# Patient Record
Sex: MALE | Race: WHITE | NOT HISPANIC OR LATINO | Employment: FULL TIME | URBAN - METROPOLITAN AREA
[De-identification: names, ages, dates, MRNs, and addresses within clinical notes are randomized per-mention and may not be internally consistent; named-entity substitution may affect disease eponyms.]

---

## 2021-09-23 ENCOUNTER — OFFICE VISIT (OUTPATIENT)
Dept: FAMILY MEDICINE CLINIC | Facility: CLINIC | Age: 41
End: 2021-09-23

## 2021-09-23 VITALS
SYSTOLIC BLOOD PRESSURE: 141 MMHG | TEMPERATURE: 98 F | HEIGHT: 65 IN | DIASTOLIC BLOOD PRESSURE: 93 MMHG | RESPIRATION RATE: 16 BRPM | BODY MASS INDEX: 45.82 KG/M2 | WEIGHT: 275 LBS | OXYGEN SATURATION: 98 % | HEART RATE: 103 BPM

## 2021-09-23 DIAGNOSIS — F32.A ANXIETY AND DEPRESSION: Primary | ICD-10-CM

## 2021-09-23 DIAGNOSIS — Z13.1 SCREENING FOR DIABETES MELLITUS: ICD-10-CM

## 2021-09-23 DIAGNOSIS — R53.83 FATIGUE, UNSPECIFIED TYPE: ICD-10-CM

## 2021-09-23 DIAGNOSIS — Z11.59 NEED FOR HEPATITIS C SCREENING TEST: ICD-10-CM

## 2021-09-23 DIAGNOSIS — F41.9 ANXIETY AND DEPRESSION: Primary | ICD-10-CM

## 2021-09-23 DIAGNOSIS — Z13.220 SCREENING CHOLESTEROL LEVEL: ICD-10-CM

## 2021-09-23 PROCEDURE — 99214 OFFICE O/P EST MOD 30 MIN: CPT | Performed by: PHYSICIAN ASSISTANT

## 2021-09-23 RX ORDER — OMEPRAZOLE 40 MG/1
40 CAPSULE, DELAYED RELEASE ORAL DAILY
Status: ON HOLD | COMMUNITY
End: 2021-11-02

## 2021-09-23 RX ORDER — FLUOXETINE HYDROCHLORIDE 20 MG/1
20 CAPSULE ORAL DAILY
Qty: 30 CAPSULE | Refills: 2 | Status: SHIPPED | OUTPATIENT
Start: 2021-09-23 | End: 2021-10-18

## 2021-09-23 RX ORDER — ALPRAZOLAM 0.5 MG/1
0.5 TABLET ORAL 2 TIMES DAILY PRN
Qty: 30 TABLET | Refills: 0 | Status: SHIPPED | OUTPATIENT
Start: 2021-09-23 | End: 2021-10-18

## 2021-09-23 NOTE — PROGRESS NOTES
Subjective   Yoan Salas is a 41 y.o. male.     Pt presents as a new patient to establish care.  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  He has had hx of depression but has been having more problems with anxiety since May.  He has been having panic attacks a few times. He has been seeing a therapist for the past year and she recommended that he comes to possibly get medication.  He has been having more problems since COVID19 started coming getting worse again.  He is vaccinated.  He was working from home due to COVID19 last year and then went back to the office but thinks that is affecting his anxiety due to fears about COVID19.  His wife thinks since he had cancer when he was younger, the thought of COVID19 brings back those memories and fears.         The following portions of the patient's history were reviewed and updated as appropriate: allergies, current medications, past family history, past medical history, past social history, past surgical history and problem list.  Past Medical History:   Diagnosis Date   • Allergic    • Anxiety    • Asthma    • Cancer (CMS/HCC)     ALL   • Depression    • GERD (gastroesophageal reflux disease)      History reviewed. No pertinent surgical history.  Family History   Problem Relation Age of Onset   • Cancer Maternal Grandfather      Social History     Socioeconomic History   • Marital status:      Spouse name: Not on file   • Number of children: Not on file   • Years of education: Not on file   • Highest education level: Not on file   Tobacco Use   • Smoking status: Never Smoker   • Smokeless tobacco: Never Used   Substance and Sexual Activity   • Alcohol use: Yes     Comment: Weekends   • Sexual activity: Defer         Current Outpatient Medications:   •  omeprazole (priLOSEC) 40 MG capsule, Take 40 mg by mouth Daily., Disp: , Rfl:   •  ALPRAZolam (Xanax) 0.5 MG tablet, Take 1 tablet by mouth 2 (Two) Times a Day As Needed for Anxiety., Disp: 30 tablet, Rfl: 0  •  FLUoxetine  "(PROzac) 20 MG capsule, Take 1 capsule by mouth Daily., Disp: 30 capsule, Rfl: 2    Review of Systems   Constitutional: Negative for activity change, appetite change, chills, diaphoresis, fatigue, fever, unexpected weight gain and unexpected weight loss.   Respiratory: Negative for chest tightness and shortness of breath.    Cardiovascular: Negative for chest pain, palpitations and leg swelling.   Gastrointestinal: Negative for abdominal pain, constipation, diarrhea, nausea, vomiting and GERD.   Neurological: Negative for dizziness, weakness, light-headedness and confusion.   Psychiatric/Behavioral: Positive for depressed mood and stress. Negative for self-injury, sleep disturbance and suicidal ideas. The patient is nervous/anxious.      /93 (BP Location: Right arm, Patient Position: Sitting, Cuff Size: Large Adult)   Pulse 103   Temp 98 °F (36.7 °C) (Temporal)   Resp 16   Ht 165.5 cm (65.16\")   Wt 125 kg (275 lb)   SpO2 98%   BMI 45.54 kg/m²       Objective   Physical Exam  Vitals and nursing note reviewed.   Constitutional:       Appearance: Normal appearance.   Eyes:      Pupils: Pupils are equal, round, and reactive to light.   Neck:      Thyroid: No thyroid mass, thyromegaly or thyroid tenderness.      Vascular: No carotid bruit.   Cardiovascular:      Rate and Rhythm: Normal rate and regular rhythm.      Heart sounds: Normal heart sounds.   Pulmonary:      Effort: Pulmonary effort is normal.      Breath sounds: Normal breath sounds.   Abdominal:      General: Abdomen is flat. Bowel sounds are normal.      Palpations: Abdomen is soft.   Musculoskeletal:      Cervical back: Normal range of motion and neck supple.   Skin:     General: Skin is warm and dry.   Neurological:      Mental Status: He is alert and oriented to person, place, and time.   Psychiatric:         Mood and Affect: Mood is anxious.         Behavior: Behavior normal.         Thought Content: Thought content normal.         Procedures "     Assessment/Plan   Diagnoses and all orders for this visit:    1. Anxiety and depression (Primary)  -     FLUoxetine (PROzac) 20 MG capsule; Take 1 capsule by mouth Daily.  Dispense: 30 capsule; Refill: 2  -     ALPRAZolam (Xanax) 0.5 MG tablet; Take 1 tablet by mouth 2 (Two) Times a Day As Needed for Anxiety.  Dispense: 30 tablet; Refill: 0    2. Need for hepatitis C screening test  -     Hepatitis C Antibody; Future    3. Screening cholesterol level  -     Lipid Panel; Future    4. Screening for diabetes mellitus  -     Comprehensive Metabolic Panel; Future    5. Fatigue, unspecified type  -     CBC & Differential; Future  -     TSH; Future      Pt to start on prozac and xanax as needed until the prozac starts working.  Pt to also work on exercise to help with anxiety. Pt to get fasting labs done and will call with results.  He will continue therapy with his therapist.    I spent 30 minutes of patient care including reviewing pt's previous hx, reviewing current symptoms, performing exam, ordering tests, discussing treatment plan and completing my note.

## 2021-09-24 ENCOUNTER — TELEPHONE (OUTPATIENT)
Dept: FAMILY MEDICINE CLINIC | Facility: CLINIC | Age: 41
End: 2021-09-24

## 2021-09-24 NOTE — TELEPHONE ENCOUNTER
Caller: Yoan Salas    Relationship: Self    Best call back number: [phone] (H)    What medications are you currently taking:   Current Outpatient Medications on File Prior to Visit   Medication Sig Dispense Refill   • ALPRAZolam (Xanax) 0.5 MG tablet Take 1 tablet by mouth 2 (Two) Times a Day As Needed for Anxiety. 30 tablet 0   • FLUoxetine (PROzac) 20 MG capsule Take 1 capsule by mouth Daily. 30 capsule 2   • omeprazole (priLOSEC) 40 MG capsule Take 40 mg by mouth Daily.       No current facility-administered medications on file prior to visit.          When did you start taking these medications: 09/23/21    Which medication are you concerned about: FLUoxetine (PROzac) 20 MG capsule AND ALPRAZolam (Xanax) 0.5 MG tablet    Who prescribed you this medication: EMMANUEL LIN    What are your concerns:SIDNEY SANTILLAN HAS SOME QUESTIONS REGARDING MEDICATIONS    How long have you had these concerns:

## 2021-09-24 NOTE — TELEPHONE ENCOUNTER
He takes the prozac every morning.  The xanax he can take as needed if he is feeling anxious up to 2 times daily.

## 2021-09-24 NOTE — TELEPHONE ENCOUNTER
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   He was jittery this morning and was going to take it before heading into work and now he is concerned.   Is there something else he can take ?   He has only taken the Prozac last night and he had planned on taking at dinner. So can he change to taking in the morning. With this medicine is he able to have a drink here or there with his wife? Like 1 or 2 on the weekends not every day or weekly.

## 2021-09-24 NOTE — TELEPHONE ENCOUNTER
Having a drink once in a while is fine with the medication.  He can switch the prozac to the morning without any issues.  Instead of taking it tonight, he would wait and take it tomorrow morning.  If he doesn't work on the weekends, see if he can try the xanax this weekend when he isn't going to have to drive and see how he feels.  If it makes him feel drowsy, I can send something else in but that can also cause drowsiness.  He can also start with taking a 1/2 tab of the xanax and see how he feels.

## 2021-09-28 ENCOUNTER — LAB (OUTPATIENT)
Dept: FAMILY MEDICINE CLINIC | Facility: CLINIC | Age: 41
End: 2021-09-28

## 2021-09-28 DIAGNOSIS — Z11.59 NEED FOR HEPATITIS C SCREENING TEST: ICD-10-CM

## 2021-09-28 DIAGNOSIS — D64.9 ANEMIA, UNSPECIFIED TYPE: ICD-10-CM

## 2021-09-28 DIAGNOSIS — R53.83 FATIGUE, UNSPECIFIED TYPE: ICD-10-CM

## 2021-09-28 DIAGNOSIS — Z13.220 SCREENING CHOLESTEROL LEVEL: ICD-10-CM

## 2021-09-28 DIAGNOSIS — Z13.1 SCREENING FOR DIABETES MELLITUS: ICD-10-CM

## 2021-09-28 DIAGNOSIS — D64.9 ANEMIA, UNSPECIFIED TYPE: Primary | ICD-10-CM

## 2021-09-28 LAB
ALBUMIN SERPL-MCNC: 4.3 G/DL (ref 3.5–5.2)
ALBUMIN/GLOB SERPL: 1.4 G/DL
ALP SERPL-CCNC: 75 U/L (ref 39–117)
ALT SERPL W P-5'-P-CCNC: 27 U/L (ref 1–41)
ANION GAP SERPL CALCULATED.3IONS-SCNC: 10.4 MMOL/L (ref 5–15)
AST SERPL-CCNC: 14 U/L (ref 1–40)
BASOPHILS # BLD AUTO: 0.04 10*3/MM3 (ref 0–0.2)
BASOPHILS NFR BLD AUTO: 0.6 % (ref 0–1.5)
BILIRUB SERPL-MCNC: 0.3 MG/DL (ref 0–1.2)
BUN SERPL-MCNC: 14 MG/DL (ref 6–20)
BUN/CREAT SERPL: 14.9 (ref 7–25)
CALCIUM SPEC-SCNC: 9.1 MG/DL (ref 8.6–10.5)
CHLORIDE SERPL-SCNC: 102 MMOL/L (ref 98–107)
CHOLEST SERPL-MCNC: 189 MG/DL (ref 0–200)
CO2 SERPL-SCNC: 23.6 MMOL/L (ref 22–29)
CREAT SERPL-MCNC: 0.94 MG/DL (ref 0.76–1.27)
DEPRECATED RDW RBC AUTO: 46.8 FL (ref 37–54)
EOSINOPHIL # BLD AUTO: 0.11 10*3/MM3 (ref 0–0.4)
EOSINOPHIL NFR BLD AUTO: 1.7 % (ref 0.3–6.2)
ERYTHROCYTE [DISTWIDTH] IN BLOOD BY AUTOMATED COUNT: 14.8 % (ref 12.3–15.4)
FERRITIN SERPL-MCNC: 73.6 NG/ML (ref 30–400)
GFR SERPL CREATININE-BSD FRML MDRD: 89 ML/MIN/1.73
GLOBULIN UR ELPH-MCNC: 3.1 GM/DL
GLUCOSE SERPL-MCNC: 94 MG/DL (ref 65–99)
HCT VFR BLD AUTO: 37.6 % (ref 37.5–51)
HCV AB SER DONR QL: NORMAL
HDLC SERPL-MCNC: 31 MG/DL (ref 40–60)
HGB BLD-MCNC: 12.2 G/DL (ref 13–17.7)
IMM GRANULOCYTES # BLD AUTO: 0.01 10*3/MM3 (ref 0–0.05)
IMM GRANULOCYTES NFR BLD AUTO: 0.2 % (ref 0–0.5)
IRON 24H UR-MRATE: 46 MCG/DL (ref 59–158)
LDLC SERPL CALC-MCNC: 133 MG/DL (ref 0–100)
LDLC/HDLC SERPL: 4.21 {RATIO}
LYMPHOCYTES # BLD AUTO: 2.33 10*3/MM3 (ref 0.7–3.1)
LYMPHOCYTES NFR BLD AUTO: 35.1 % (ref 19.6–45.3)
MCH RBC QN AUTO: 27.7 PG (ref 26.6–33)
MCHC RBC AUTO-ENTMCNC: 32.4 G/DL (ref 31.5–35.7)
MCV RBC AUTO: 85.5 FL (ref 79–97)
MONOCYTES # BLD AUTO: 0.55 10*3/MM3 (ref 0.1–0.9)
MONOCYTES NFR BLD AUTO: 8.3 % (ref 5–12)
NEUTROPHILS NFR BLD AUTO: 3.6 10*3/MM3 (ref 1.7–7)
NEUTROPHILS NFR BLD AUTO: 54.1 % (ref 42.7–76)
NRBC BLD AUTO-RTO: 0 /100 WBC (ref 0–0.2)
PLATELET # BLD AUTO: 361 10*3/MM3 (ref 140–450)
PMV BLD AUTO: 10.4 FL (ref 6–12)
POTASSIUM SERPL-SCNC: 3.9 MMOL/L (ref 3.5–5.2)
PROT SERPL-MCNC: 7.4 G/DL (ref 6–8.5)
RBC # BLD AUTO: 4.4 10*6/MM3 (ref 4.14–5.8)
SODIUM SERPL-SCNC: 136 MMOL/L (ref 136–145)
TRIGL SERPL-MCNC: 137 MG/DL (ref 0–150)
TSH SERPL DL<=0.05 MIU/L-ACNC: 2.1 UIU/ML (ref 0.27–4.2)
VLDLC SERPL-MCNC: 25 MG/DL (ref 5–40)
WBC # BLD AUTO: 6.64 10*3/MM3 (ref 3.4–10.8)

## 2021-09-28 PROCEDURE — 82746 ASSAY OF FOLIC ACID SERUM: CPT | Performed by: PHYSICIAN ASSISTANT

## 2021-09-28 PROCEDURE — 82607 VITAMIN B-12: CPT | Performed by: PHYSICIAN ASSISTANT

## 2021-09-28 PROCEDURE — 86803 HEPATITIS C AB TEST: CPT | Performed by: PHYSICIAN ASSISTANT

## 2021-09-28 PROCEDURE — 84443 ASSAY THYROID STIM HORMONE: CPT | Performed by: PHYSICIAN ASSISTANT

## 2021-09-28 PROCEDURE — 83540 ASSAY OF IRON: CPT | Performed by: PHYSICIAN ASSISTANT

## 2021-09-28 PROCEDURE — 80053 COMPREHEN METABOLIC PANEL: CPT | Performed by: PHYSICIAN ASSISTANT

## 2021-09-28 PROCEDURE — 80061 LIPID PANEL: CPT | Performed by: PHYSICIAN ASSISTANT

## 2021-09-28 PROCEDURE — 36415 COLL VENOUS BLD VENIPUNCTURE: CPT

## 2021-09-28 PROCEDURE — 82728 ASSAY OF FERRITIN: CPT | Performed by: PHYSICIAN ASSISTANT

## 2021-09-28 PROCEDURE — 85025 COMPLETE CBC W/AUTO DIFF WBC: CPT | Performed by: PHYSICIAN ASSISTANT

## 2021-09-29 DIAGNOSIS — D50.9 IRON DEFICIENCY ANEMIA, UNSPECIFIED IRON DEFICIENCY ANEMIA TYPE: Primary | ICD-10-CM

## 2021-09-29 LAB
FOLATE SERPL-MCNC: 7.02 NG/ML (ref 4.78–24.2)
VIT B12 BLD-MCNC: 476 PG/ML (ref 211–946)

## 2021-09-30 ENCOUNTER — TELEPHONE (OUTPATIENT)
Dept: FAMILY MEDICINE CLINIC | Facility: CLINIC | Age: 41
End: 2021-09-30

## 2021-09-30 NOTE — TELEPHONE ENCOUNTER
Hub staff attempted to follow warm transfer process and was unsuccessful     Caller: Yoan Salas    Relationship: Self    Best call back number:[phone]  Who are you requesting to speak with (clinical staff, provider,  specific staff member): CLINICAL STAFF     What was the call regarding: RETURNING PHONE CALL FOR LAB RESULTS     Do you require a callback: YES

## 2021-10-01 ENCOUNTER — OFFICE (OUTPATIENT)
Dept: URBAN - METROPOLITAN AREA CLINIC 64 | Facility: CLINIC | Age: 41
End: 2021-10-01

## 2021-10-01 VITALS
WEIGHT: 272 LBS | HEART RATE: 96 BPM | SYSTOLIC BLOOD PRESSURE: 161 MMHG | HEIGHT: 69 IN | DIASTOLIC BLOOD PRESSURE: 108 MMHG

## 2021-10-01 DIAGNOSIS — D50.0 IRON DEFICIENCY ANEMIA SECONDARY TO BLOOD LOSS (CHRONIC): ICD-10-CM

## 2021-10-01 DIAGNOSIS — K21.9 GASTRO-ESOPHAGEAL REFLUX DISEASE WITHOUT ESOPHAGITIS: ICD-10-CM

## 2021-10-01 PROCEDURE — 99202 OFFICE O/P NEW SF 15 MIN: CPT | Performed by: INTERNAL MEDICINE

## 2021-10-01 RX ORDER — OMEPRAZOLE 40 MG/1
CAPSULE, DELAYED RELEASE ORAL
Qty: 90 | Refills: 5 | Status: ACTIVE

## 2021-10-01 RX ORDER — FLUOXETINE HYDROCHLORIDE 40 MG/1
CAPSULE ORAL
Qty: 90 | Refills: 5 | Status: ACTIVE

## 2021-10-04 ENCOUNTER — TELEPHONE (OUTPATIENT)
Dept: FAMILY MEDICINE CLINIC | Facility: CLINIC | Age: 41
End: 2021-10-04

## 2021-10-04 NOTE — TELEPHONE ENCOUNTER
Caller: Yoan Salas    Relationship: Self    Best call back number: [phone]    Who are you requesting to speak with (clinical staff, provider,  specific staff member): EMMANUEL LIN     What was the call regarding: PATIENT STATES A TUMOR HAS BEEN FOUND AND THEY ARE DOING TESTING ON IT AND WANTED TO MAKE SURE EMMANUEL HAS THE INFORMATION AND WOULD LIKE TO SPEAK TO HER IF POSSIBLE     Do you require a callback: YES

## 2021-10-05 ENCOUNTER — TELEPHONE (OUTPATIENT)
Dept: FAMILY MEDICINE CLINIC | Facility: CLINIC | Age: 41
End: 2021-10-05

## 2021-10-05 NOTE — TELEPHONE ENCOUNTER
Spoke with pt and he had CT head ordered by GI since he has difficulty finding the right words for what he was trying to say while in his office.  He was told he has a tumor and he is scheduled to have EEG and meet with neurosurgeon.  He will let me know if he needs anything else.

## 2021-10-11 ENCOUNTER — TELEPHONE (OUTPATIENT)
Dept: FAMILY MEDICINE CLINIC | Facility: CLINIC | Age: 41
End: 2021-10-11

## 2021-10-11 NOTE — TELEPHONE ENCOUNTER
He can start by taking the med every other day for a week then every two days for a week then every three days for a week... and so on for a month.

## 2021-10-11 NOTE — TELEPHONE ENCOUNTER
Caller: Yoan Salas    Relationship: Self    Best call back number:  [phone]     What is the best time to reach you: ANY     Who are you requesting to speak with (clinical staff, provider,  specific staff member): EMMANUEL LIN     Do you know the name of the person who called: N/A     What was the call regarding: PATIENT WOULD LIKE DIRECTIONS ON HOW TO BEST WEEN OFF FLUoxetine (PROzac) 20 MG capsule . PATIENT STATED HE DOESN'T THINK HE NEEDS IT .     Do you require a callback: YES

## 2021-10-15 ENCOUNTER — TRANSCRIBE ORDERS (OUTPATIENT)
Dept: ADMINISTRATIVE | Facility: HOSPITAL | Age: 41
End: 2021-10-15

## 2021-10-15 DIAGNOSIS — D49.6 BRAIN TUMOR (HCC): Primary | ICD-10-CM

## 2021-10-17 RX ORDER — FLUOXETINE HYDROCHLORIDE 40 MG/1
CAPSULE ORAL
COMMUNITY
Start: 2021-10-01 | End: 2021-10-18

## 2021-10-18 ENCOUNTER — OFFICE VISIT (OUTPATIENT)
Dept: FAMILY MEDICINE CLINIC | Facility: CLINIC | Age: 41
End: 2021-10-18

## 2021-10-18 ENCOUNTER — TELEPHONE (OUTPATIENT)
Dept: FAMILY MEDICINE CLINIC | Facility: CLINIC | Age: 41
End: 2021-10-18

## 2021-10-18 VITALS
DIASTOLIC BLOOD PRESSURE: 112 MMHG | OXYGEN SATURATION: 97 % | WEIGHT: 272 LBS | BODY MASS INDEX: 45.32 KG/M2 | RESPIRATION RATE: 16 BRPM | TEMPERATURE: 98.6 F | HEART RATE: 97 BPM | SYSTOLIC BLOOD PRESSURE: 170 MMHG | HEIGHT: 65 IN

## 2021-10-18 DIAGNOSIS — R03.0 BLOOD PRESSURE ELEVATED WITHOUT HISTORY OF HTN: ICD-10-CM

## 2021-10-18 DIAGNOSIS — F32.A ANXIETY AND DEPRESSION: Primary | ICD-10-CM

## 2021-10-18 DIAGNOSIS — F41.9 ANXIETY AND DEPRESSION: Primary | ICD-10-CM

## 2021-10-18 DIAGNOSIS — R12 HEARTBURN: ICD-10-CM

## 2021-10-18 DIAGNOSIS — G93.89 LEFT PARIETAL MASS: ICD-10-CM

## 2021-10-18 PROCEDURE — 99213 OFFICE O/P EST LOW 20 MIN: CPT | Performed by: PHYSICIAN ASSISTANT

## 2021-10-18 RX ORDER — LEVETIRACETAM 500 MG/1
500 TABLET ORAL 2 TIMES DAILY
Status: ON HOLD | COMMUNITY
Start: 2021-10-14 | End: 2021-11-05 | Stop reason: SDUPTHER

## 2021-10-18 RX ORDER — DEXAMETHASONE 4 MG/1
4 TABLET ORAL 4 TIMES DAILY
COMMUNITY
Start: 2021-10-14 | End: 2021-11-05 | Stop reason: HOSPADM

## 2021-10-18 RX ORDER — FAMOTIDINE 20 MG/1
20 TABLET, FILM COATED ORAL 2 TIMES DAILY
COMMUNITY
End: 2022-11-23

## 2021-10-18 NOTE — PROGRESS NOTES
Subjective   Yoan Salas is a 41 y.o. male.     Pt is here to follow up on his anxiety and discuss a brain tumor that was recently diagnosed after a CT scan.  Pt was recently diagnosed with a brain tumor found on a CT scan head ordered by Dr. Thompson at his visit with him when he was having confusion and problems with speech.  He is seeing neurosurgeon and has surgery is set for November 2nd.    His blood pressure is elevated today.  He is not sure if it is due to feeling nervous.  He is also on steroids currently to try to decrease the inflammation of his brain tumor.  He has tapered off his prozac since he didn't think he needed it.  He has been overall not feeling as anxious and feels like the prozac made him feel a little worse.  He denies any current headache or chest pain.  His speech is normal today in office.       The following portions of the patient's history were reviewed and updated as appropriate: allergies, current medications, past family history, past medical history, past social history, past surgical history and problem list.  Past Medical History:   Diagnosis Date   • Allergic    • Anxiety    • Asthma    • Cancer (HCC)     ALL   • Depression    • GERD (gastroesophageal reflux disease)      No past surgical history on file.  Family History   Problem Relation Age of Onset   • Cancer Maternal Grandfather      Social History     Socioeconomic History   • Marital status:    Tobacco Use   • Smoking status: Never Smoker   • Smokeless tobacco: Never Used   Substance and Sexual Activity   • Alcohol use: Yes     Comment: Weekends   • Sexual activity: Defer         Current Outpatient Medications:   •  famotidine (PEPCID) 20 MG tablet, Take 20 mg by mouth 2 (Two) Times a Day., Disp: , Rfl:   •  dexamethasone (DECADRON) 4 MG tablet, Take 4 mg by mouth 4 (Four) Times a Day., Disp: , Rfl:   •  levETIRAcetam (KEPPRA) 500 MG tablet, Take 500 mg by mouth 2 (two) times a day., Disp: , Rfl:   •  omeprazole  "(priLOSEC) 40 MG capsule, Take 40 mg by mouth Daily., Disp: , Rfl:   •  sucralfate (Carafate) 1 g tablet, Take 1 tablet by mouth 4 (Four) Times a Day., Disp: 28 tablet, Rfl: 0    Review of Systems   Constitutional: Negative for activity change, appetite change, chills, diaphoresis, fatigue, fever, unexpected weight gain and unexpected weight loss.   Eyes: Negative for blurred vision, double vision and visual disturbance.   Respiratory: Negative for chest tightness and shortness of breath.    Cardiovascular: Negative for chest pain, palpitations and leg swelling.   Gastrointestinal: Negative for abdominal pain, nausea and vomiting.   Neurological: Positive for speech difficulty and memory problem. Negative for dizziness, seizures, weakness, light-headedness, headache and confusion.   Psychiatric/Behavioral: Positive for stress. Negative for sleep disturbance and depressed mood. The patient is nervous/anxious.      BP (!) 170/112 (BP Location: Left arm, Patient Position: Sitting, Cuff Size: Large Adult)   Pulse 97   Temp 98.6 °F (37 °C) (Temporal)   Resp 16   Ht 165.5 cm (65.16\")   Wt 123 kg (272 lb)   SpO2 97%   BMI 45.05 kg/m²       Objective   Physical Exam  Vitals and nursing note reviewed.   Constitutional:       Appearance: Normal appearance.   HENT:      Head: Normocephalic and atraumatic.   Eyes:      Pupils: Pupils are equal, round, and reactive to light.   Cardiovascular:      Rate and Rhythm: Normal rate and regular rhythm.      Heart sounds: Normal heart sounds.   Pulmonary:      Effort: Pulmonary effort is normal.      Breath sounds: Normal breath sounds.   Abdominal:      General: Abdomen is flat. Bowel sounds are normal.      Palpations: Abdomen is soft.   Skin:     General: Skin is warm and dry.   Neurological:      Mental Status: He is alert and oriented to person, place, and time. Mental status is at baseline.   Psychiatric:         Attention and Perception: Attention and perception normal.    "      Mood and Affect: Mood is anxious.         Speech: Speech normal.         Behavior: Behavior normal.         Thought Content: Thought content normal.         Cognition and Memory: Cognition normal.         Judgment: Judgment normal.         Procedures     Assessment/Plan   Diagnoses and all orders for this visit:    1. Anxiety and depression (Primary)  Pt to feels like he doesn't need the prozac presently since he feels like he felt a little worse when taking it.  He does still appear somewhat anxious in office today but would prefer to avoid medications at this time.  2. Left parietal mass  Pt to continue follow up with neurosurgery and has surgery scheduled for November 2nd as of now.  3. Blood pressure elevated without history of HTN  Blood pressure likely elevated secondary to some residual anxiety/stress and the decadron he is currently taking for his brain tumor.  Pt to monitor at home and let me know how his levels are doing.  Also work on low sodium diet.  4. Heartburn R12  Likely secondary to steroid use.  Pt to continue his current meds and try adding tums or mylanta as needed.  Let me know if not improving.  Make sure he is also taking his steroids with food on his stomach.  Pt to follow up with neurosurgeon as scheduled.  His blood pressure is extremely elevated today but he denies any current symptoms of headache or vision problem.  He has stopped taking his prozac since he thinks his mood changes were due to tumor.  He is taking his medications as prescribed by neurosurgeon.  If blood pressure is elevated at home, will need to start bp medication.

## 2021-10-19 NOTE — TELEPHONE ENCOUNTER
Please check with pt to see if he can monitor his blood pressure at home since it was pretty high in office today.  It could be secondary to the oral steroid.  If it is elevated at home as well, may need to start on medication.

## 2021-10-19 NOTE — TELEPHONE ENCOUNTER
Text patient and let him know the range and to keep track for a week and then to call us and let us know what they are and we can go from there

## 2021-10-20 ENCOUNTER — TELEPHONE (OUTPATIENT)
Dept: FAMILY MEDICINE CLINIC | Facility: CLINIC | Age: 41
End: 2021-10-20

## 2021-10-20 DIAGNOSIS — K21.00 GASTROESOPHAGEAL REFLUX DISEASE WITH ESOPHAGITIS WITHOUT HEMORRHAGE: Primary | ICD-10-CM

## 2021-10-20 RX ORDER — SUCRALFATE 1 G/1
1 TABLET ORAL 4 TIMES DAILY
Qty: 28 TABLET | Refills: 0 | Status: SHIPPED | OUTPATIENT
Start: 2021-10-20 | End: 2021-10-29 | Stop reason: SDUPTHER

## 2021-10-20 NOTE — TELEPHONE ENCOUNTER
Please let pt know that I sent a prescription to his pharmacy.  He can still take his regular medications with this.

## 2021-10-20 NOTE — TELEPHONE ENCOUNTER
Caller: Yoan Salas    Relationship: Self    Best call back number: [phone]     What medication are you requesting: ANTIACID    What are your current symptoms: STOMACH IRRITATION IS NOT GETTING BETTER    How long have you been experiencing symptoms: A FEW DAYS       If a prescription is needed, what is your preferred pharmacy and phone number:    [facility] - [place], [place] - [address] [facility] - [phone]  - [phone]   [phone]  \  Additional notes:PATIENT NEEDS TO BE ADVISED. OVER THE COUNTER MEDS HAVE NOT MADE THIS ANY BETTER.

## 2021-10-21 DIAGNOSIS — I15.8 OTHER SECONDARY HYPERTENSION: Primary | ICD-10-CM

## 2021-10-21 RX ORDER — AMLODIPINE BESYLATE 5 MG/1
5 TABLET ORAL DAILY
Qty: 30 TABLET | Refills: 0 | Status: SHIPPED | OUTPATIENT
Start: 2021-10-21 | End: 2021-10-27

## 2021-10-21 NOTE — TELEPHONE ENCOUNTER
Please let pt know that since it is still elevated, I would like him to try amlodipine which is a blood pressure medication to see if we can get is under better control. He can continue to monitor it and let me know how his blood pressure is doing early next week.

## 2021-10-26 ENCOUNTER — APPOINTMENT (OUTPATIENT)
Dept: OTHER | Facility: HOSPITAL | Age: 41
End: 2021-10-26

## 2021-10-26 ENCOUNTER — HOSPITAL ENCOUNTER (OUTPATIENT)
Dept: MRI IMAGING | Facility: HOSPITAL | Age: 41
Discharge: HOME | End: 2021-10-26

## 2021-10-26 ENCOUNTER — LAB (OUTPATIENT)
Dept: LAB | Facility: HOSPITAL | Age: 41
End: 2021-10-26

## 2021-10-26 ENCOUNTER — HOSPITAL ENCOUNTER (OUTPATIENT)
Dept: GENERAL RADIOLOGY | Facility: HOSPITAL | Age: 41
Discharge: HOME | End: 2021-10-26

## 2021-10-26 DIAGNOSIS — Z09 FOLLOW UP: ICD-10-CM

## 2021-10-26 DIAGNOSIS — D49.6 BRAIN TUMOR (HCC): ICD-10-CM

## 2021-10-26 LAB
ANION GAP SERPL CALCULATED.3IONS-SCNC: 13.9 MMOL/L (ref 5–15)
APTT PPP: 21.9 SECONDS (ref 24–31)
BASOPHILS # BLD AUTO: 0.1 10*3/MM3 (ref 0–0.2)
BASOPHILS NFR BLD AUTO: 0.6 % (ref 0–1.5)
BILIRUB UR QL STRIP: NEGATIVE
BUN SERPL-MCNC: 26 MG/DL (ref 6–20)
BUN/CREAT SERPL: 31.7 (ref 7–25)
CALCIUM SPEC-SCNC: 8.9 MG/DL (ref 8.6–10.5)
CHLORIDE SERPL-SCNC: 95 MMOL/L (ref 98–107)
CLARITY UR: CLEAR
CO2 SERPL-SCNC: 21.1 MMOL/L (ref 22–29)
COLOR UR: YELLOW
CREAT BLDA-MCNC: 0.8 MG/DL (ref 0.6–1.3)
CREAT SERPL-MCNC: 0.82 MG/DL (ref 0.76–1.27)
DEPRECATED RDW RBC AUTO: 47.7 FL (ref 37–54)
EOSINOPHIL # BLD AUTO: 0 10*3/MM3 (ref 0–0.4)
EOSINOPHIL NFR BLD AUTO: 0 % (ref 0.3–6.2)
ERYTHROCYTE [DISTWIDTH] IN BLOOD BY AUTOMATED COUNT: 15.7 % (ref 12.3–15.4)
GFR SERPL CREATININE-BSD FRML MDRD: 104 ML/MIN/1.73
GLUCOSE SERPL-MCNC: 158 MG/DL (ref 65–99)
GLUCOSE UR STRIP-MCNC: NEGATIVE MG/DL
HCT VFR BLD AUTO: 43.5 % (ref 37.5–51)
HGB BLD-MCNC: 14.2 G/DL (ref 13–17.7)
HGB UR QL STRIP.AUTO: NEGATIVE
IMM GRANULOCYTES # BLD AUTO: 0.76 10*3/MM3 (ref 0–0.05)
IMM GRANULOCYTES NFR BLD AUTO: 4.8 % (ref 0–0.5)
INR PPP: 0.98 (ref 0.93–1.1)
KETONES UR QL STRIP: NEGATIVE
LEUKOCYTE ESTERASE UR QL STRIP.AUTO: NEGATIVE
LYMPHOCYTES # BLD AUTO: 0.94 10*3/MM3 (ref 0.7–3.1)
LYMPHOCYTES NFR BLD AUTO: 6 % (ref 19.6–45.3)
MCH RBC QN AUTO: 27.3 PG (ref 26.6–33)
MCHC RBC AUTO-ENTMCNC: 32.6 G/DL (ref 31.5–35.7)
MCV RBC AUTO: 83.7 FL (ref 79–97)
MONOCYTES # BLD AUTO: 1.16 10*3/MM3 (ref 0.1–0.9)
MONOCYTES NFR BLD AUTO: 7.4 % (ref 5–12)
MRSA DNA SPEC QL NAA+PROBE: NORMAL
NEUTROPHILS NFR BLD AUTO: 12.82 10*3/MM3 (ref 1.7–7)
NEUTROPHILS NFR BLD AUTO: 81.2 % (ref 42.7–76)
NITRITE UR QL STRIP: NEGATIVE
NRBC BLD AUTO-RTO: 0.1 /100 WBC (ref 0–0.2)
PH UR STRIP.AUTO: 6 [PH] (ref 5–8)
PLATELET # BLD AUTO: 387 10*3/MM3 (ref 140–450)
PMV BLD AUTO: 10.2 FL (ref 6–12)
POTASSIUM SERPL-SCNC: 4.4 MMOL/L (ref 3.5–5.2)
PROT UR QL STRIP: ABNORMAL
PROTHROMBIN TIME: 10.9 SECONDS (ref 9.6–11.7)
RBC # BLD AUTO: 5.2 10*6/MM3 (ref 4.14–5.8)
SODIUM SERPL-SCNC: 130 MMOL/L (ref 136–145)
SP GR UR STRIP: 1.03 (ref 1–1.03)
UROBILINOGEN UR QL STRIP: ABNORMAL
WBC # BLD AUTO: 15.78 10*3/MM3 (ref 3.4–10.8)

## 2021-10-26 PROCEDURE — 70553 MRI BRAIN STEM W/O & W/DYE: CPT

## 2021-10-26 PROCEDURE — 85610 PROTHROMBIN TIME: CPT

## 2021-10-26 PROCEDURE — 71046 X-RAY EXAM CHEST 2 VIEWS: CPT

## 2021-10-26 PROCEDURE — 81003 URINALYSIS AUTO W/O SCOPE: CPT

## 2021-10-26 PROCEDURE — 82565 ASSAY OF CREATININE: CPT

## 2021-10-26 PROCEDURE — 85025 COMPLETE CBC W/AUTO DIFF WBC: CPT

## 2021-10-26 PROCEDURE — 85730 THROMBOPLASTIN TIME PARTIAL: CPT

## 2021-10-26 PROCEDURE — 87641 MR-STAPH DNA AMP PROBE: CPT

## 2021-10-26 PROCEDURE — 25010000002 GADOTERIDOL PER 1 ML: Performed by: NEUROLOGICAL SURGERY

## 2021-10-26 PROCEDURE — A9579 GAD-BASE MR CONTRAST NOS,1ML: HCPCS | Performed by: NEUROLOGICAL SURGERY

## 2021-10-26 PROCEDURE — 80048 BASIC METABOLIC PNL TOTAL CA: CPT

## 2021-10-26 RX ADMIN — GADOTERIDOL 20 ML: 279.3 INJECTION, SOLUTION INTRAVENOUS at 11:36

## 2021-10-27 ENCOUNTER — TELEPHONE (OUTPATIENT)
Dept: FAMILY MEDICINE CLINIC | Facility: CLINIC | Age: 41
End: 2021-10-27

## 2021-10-27 DIAGNOSIS — I15.8 OTHER SECONDARY HYPERTENSION: ICD-10-CM

## 2021-10-27 RX ORDER — AMLODIPINE BESYLATE 10 MG/1
10 TABLET ORAL DAILY
Qty: 30 TABLET | Refills: 0
Start: 2021-10-27 | End: 2021-11-29 | Stop reason: SDUPTHER

## 2021-10-27 NOTE — TELEPHONE ENCOUNTER
PATIENT CALLED TO GIVE THE FOLLOWING BLOOD PRESSURE READINGS:     10/19 - 146/104 PM   10/20 - 173/104 AM  154/112  156/113PM   10/21 - 184/116 AM  170/115   142/107PM   10/22 - 188/124 AM  154/105 /104PM 10/23 - 149/107 AM  139/96 PM  10/24 - 104/102 AM  144/95 3PM 140/101 PM  10/25 - 135/95 AM   10/26 - 146/103 AM  145/110 PM     PLEASE ADVISE     PT CALL BACK   [phone]

## 2021-10-29 DIAGNOSIS — K21.00 GASTROESOPHAGEAL REFLUX DISEASE WITH ESOPHAGITIS WITHOUT HEMORRHAGE: ICD-10-CM

## 2021-10-29 RX ORDER — SUCRALFATE 1 G/1
1 TABLET ORAL 4 TIMES DAILY
Qty: 28 TABLET | Refills: 0 | Status: SHIPPED | OUTPATIENT
Start: 2021-10-29 | End: 2021-11-09 | Stop reason: SDUPTHER

## 2021-10-29 NOTE — TELEPHONE ENCOUNTER
Caller: Yoan Salas    Relationship: Self    Requested Prescriptions:   Requested Prescriptions     Pending Prescriptions Disp Refills   • sucralfate (Carafate) 1 g tablet 28 tablet 0     Sig: Take 1 tablet by mouth 4 (Four) Times a Day.      Pharmacy where request should be sent:   [facility] [address] [place] [address], [place] - [address] [facility] - [phone]  - [phone]  [phone]    Best call back number: [phone]    Does the patient have less than a 3 day supply:  [x] Yes  [] No    Jeanne Posadas Rep   10/29/21 08:15 EDT

## 2021-10-30 ENCOUNTER — LAB (OUTPATIENT)
Dept: LAB | Facility: HOSPITAL | Age: 41
End: 2021-10-30

## 2021-10-30 ENCOUNTER — HOSPITAL ENCOUNTER (OUTPATIENT)
Dept: CARDIOLOGY | Facility: HOSPITAL | Age: 41
Discharge: HOME | End: 2021-10-30

## 2021-10-30 PROCEDURE — 93005 ELECTROCARDIOGRAM TRACING: CPT | Performed by: NEUROLOGICAL SURGERY

## 2021-10-30 PROCEDURE — C9803 HOPD COVID-19 SPEC COLLECT: HCPCS

## 2021-10-30 PROCEDURE — U0004 COV-19 TEST NON-CDC HGH THRU: HCPCS

## 2021-10-30 PROCEDURE — 93010 ELECTROCARDIOGRAM REPORT: CPT | Performed by: INTERNAL MEDICINE

## 2021-10-31 LAB — SARS-COV-2 ORF1AB RESP QL NAA+PROBE: NOT DETECTED

## 2021-11-01 ENCOUNTER — ANESTHESIA EVENT (OUTPATIENT)
Dept: PERIOP | Facility: HOSPITAL | Age: 41
End: 2021-11-01

## 2021-11-01 RX ORDER — ALBUTEROL SULFATE 90 UG/1
2 AEROSOL, METERED RESPIRATORY (INHALATION) EVERY 4 HOURS PRN
Status: ON HOLD | COMMUNITY
End: 2021-11-02

## 2021-11-02 ENCOUNTER — ANESTHESIA (OUTPATIENT)
Dept: PERIOP | Facility: HOSPITAL | Age: 41
End: 2021-11-02

## 2021-11-02 ENCOUNTER — HOSPITAL ENCOUNTER (INPATIENT)
Facility: HOSPITAL | Age: 41
LOS: 3 days | Discharge: HOME | End: 2021-11-05
Attending: NEUROLOGICAL SURGERY | Admitting: NEUROLOGICAL SURGERY

## 2021-11-02 ENCOUNTER — APPOINTMENT (OUTPATIENT)
Dept: GENERAL RADIOLOGY | Facility: HOSPITAL | Age: 41
End: 2021-11-02

## 2021-11-02 DIAGNOSIS — D49.6 BRAIN TUMOR (HCC): Primary | ICD-10-CM

## 2021-11-02 LAB
ABO GROUP BLD: NORMAL
BASE DEFICIT: ABNORMAL
BASE EXCESS BLDA CALC-SCNC: <0 MMOL/L (ref 0–3)
BLD GP AB SCN SERPL QL: NEGATIVE
CA-I BLDA-SCNC: 1.03 MMOL/L (ref 1.12–1.32)
CA-I BLDA-SCNC: 1.06 MMOL/L (ref 1.12–1.32)
CA-I BLDA-SCNC: 1.08 MMOL/L (ref 1.12–1.32)
CO2 BLDA-SCNC: 26 MMOL/L (ref 23–27)
GLUCOSE BLDC GLUCOMTR-MCNC: 162 MG/DL (ref 70–105)
GLUCOSE BLDC GLUCOMTR-MCNC: 169 MG/DL (ref 70–105)
GLUCOSE BLDC GLUCOMTR-MCNC: 184 MG/DL (ref 70–105)
GLUCOSE BLDC GLUCOMTR-MCNC: 185 MG/DL (ref 70–105)
GLUCOSE BLDC GLUCOMTR-MCNC: 191 MG/DL (ref 70–105)
GLUCOSE BLDC GLUCOMTR-MCNC: 192 MG/DL (ref 70–105)
HCO3 BLDA-SCNC: 24.4 MMOL/L (ref 22–26)
HCO3 BLDA-SCNC: 24.5 MMOL/L (ref 22–26)
HCO3 BLDA-SCNC: 24.8 MMOL/L (ref 22–26)
HCT VFR BLDA CALC: 26 % (ref 38–51)
HCT VFR BLDA CALC: 32 % (ref 38–51)
HCT VFR BLDA CALC: 35 % (ref 38–51)
HGB BLDA-MCNC: 10.9 G/DL (ref 12–17)
HGB BLDA-MCNC: 11.9 G/DL (ref 12–17)
HGB BLDA-MCNC: 8.8 G/DL (ref 12–17)
PCO2 BLDA: 43.3 MM HG (ref 35–45)
PCO2 BLDA: 45.2 MM HG (ref 35–45)
PCO2 BLDA: 51.8 MM HG (ref 35–45)
PH BLDA: 7.28 PH UNITS (ref 7.35–7.45)
PH BLDA: 7.35 PH UNITS (ref 7.35–7.45)
PH BLDA: 7.36 PH UNITS (ref 7.35–7.45)
PO2 BLDA: 198 MM HG (ref 80–105)
PO2 BLDA: 238 MM HG (ref 80–105)
PO2 BLDA: 261 MM HG (ref 80–105)
POTASSIUM BLDA-SCNC: 4.7 MMOL/L (ref 3.5–4.9)
POTASSIUM BLDA-SCNC: 4.8 MMOL/L (ref 3.5–4.9)
POTASSIUM BLDA-SCNC: 5.2 MMOL/L (ref 3.5–4.9)
QT INTERVAL: 338 MS
RH BLD: POSITIVE
SAO2 % BLDCOA: 100 % (ref 95–98)
SODIUM BLD-SCNC: 127 MMOL/L (ref 138–146)
SODIUM BLD-SCNC: 130 MMOL/L (ref 138–146)
SODIUM BLD-SCNC: 132 MMOL/L (ref 138–146)
T&S EXPIRATION DATE: NORMAL

## 2021-11-02 PROCEDURE — 25010000002 FENTANYL CITRATE (PF) 100 MCG/2ML SOLUTION: Performed by: ANESTHESIOLOGY

## 2021-11-02 PROCEDURE — 86900 BLOOD TYPING SEROLOGIC ABO: CPT

## 2021-11-02 PROCEDURE — 82803 BLOOD GASES ANY COMBINATION: CPT

## 2021-11-02 PROCEDURE — C1889 IMPLANT/INSERT DEVICE, NOC: HCPCS | Performed by: NEUROLOGICAL SURGERY

## 2021-11-02 PROCEDURE — 82330 ASSAY OF CALCIUM: CPT

## 2021-11-02 PROCEDURE — 82962 GLUCOSE BLOOD TEST: CPT

## 2021-11-02 PROCEDURE — 84295 ASSAY OF SERUM SODIUM: CPT

## 2021-11-02 PROCEDURE — 25010000002 MIDAZOLAM PER 1 MG: Performed by: ANESTHESIOLOGY

## 2021-11-02 PROCEDURE — 25010000002 DEXAMETHASONE PER 1 MG: Performed by: NEUROLOGICAL SURGERY

## 2021-11-02 PROCEDURE — C1713 ANCHOR/SCREW BN/BN,TIS/BN: HCPCS | Performed by: NEUROLOGICAL SURGERY

## 2021-11-02 PROCEDURE — 25010000002 LEVETIRACETAM IN NACL 0.82% 500 MG/100ML SOLUTION: Performed by: NEUROLOGICAL SURGERY

## 2021-11-02 PROCEDURE — 00B10ZZ EXCISION OF CEREBRAL MENINGES, OPEN APPROACH: ICD-10-PCS | Performed by: NEUROLOGICAL SURGERY

## 2021-11-02 PROCEDURE — 82947 ASSAY GLUCOSE BLOOD QUANT: CPT

## 2021-11-02 PROCEDURE — 88331 PATH CONSLTJ SURG 1 BLK 1SPC: CPT | Performed by: NEUROLOGICAL SURGERY

## 2021-11-02 PROCEDURE — 36430 TRANSFUSION BLD/BLD COMPNT: CPT

## 2021-11-02 PROCEDURE — 88307 TISSUE EXAM BY PATHOLOGIST: CPT | Performed by: NEUROLOGICAL SURGERY

## 2021-11-02 PROCEDURE — 86901 BLOOD TYPING SEROLOGIC RH(D): CPT | Performed by: NEUROLOGICAL SURGERY

## 2021-11-02 PROCEDURE — 86901 BLOOD TYPING SEROLOGIC RH(D): CPT

## 2021-11-02 PROCEDURE — 25010000002 HYDROMORPHONE PER 4 MG: Performed by: ANESTHESIOLOGY

## 2021-11-02 PROCEDURE — 25010000002 MORPHINE PER 10 MG: Performed by: NEUROLOGICAL SURGERY

## 2021-11-02 PROCEDURE — P9041 ALBUMIN (HUMAN),5%, 50ML: HCPCS | Performed by: ANESTHESIOLOGY

## 2021-11-02 PROCEDURE — 25010000002 DEXAMETHASONE PER 1 MG: Performed by: ANESTHESIOLOGY

## 2021-11-02 PROCEDURE — 25010000002 FLUCONAZOLE PER 200 MG: Performed by: NEUROLOGICAL SURGERY

## 2021-11-02 PROCEDURE — 86900 BLOOD TYPING SEROLOGIC ABO: CPT | Performed by: NEUROLOGICAL SURGERY

## 2021-11-02 PROCEDURE — 86923 COMPATIBILITY TEST ELECTRIC: CPT

## 2021-11-02 PROCEDURE — 25010000002 ONDANSETRON PER 1 MG: Performed by: ANESTHESIOLOGY

## 2021-11-02 PROCEDURE — 94799 UNLISTED PULMONARY SVC/PX: CPT

## 2021-11-02 PROCEDURE — 84132 ASSAY OF SERUM POTASSIUM: CPT

## 2021-11-02 PROCEDURE — C1751 CATH, INF, PER/CENT/MIDLINE: HCPCS | Performed by: ANESTHESIOLOGY

## 2021-11-02 PROCEDURE — P9016 RBC LEUKOCYTES REDUCED: HCPCS

## 2021-11-02 PROCEDURE — 85014 HEMATOCRIT: CPT

## 2021-11-02 PROCEDURE — 25010000002 VANCOMYCIN 1 G RECONSTITUTED SOLUTION 1 EACH VIAL: Performed by: NEUROLOGICAL SURGERY

## 2021-11-02 PROCEDURE — 86850 RBC ANTIBODY SCREEN: CPT | Performed by: NEUROLOGICAL SURGERY

## 2021-11-02 PROCEDURE — 25010000002 PROPOFOL 10 MG/ML EMULSION: Performed by: ANESTHESIOLOGY

## 2021-11-02 PROCEDURE — 25010000002 ALBUMIN HUMAN 5% PER 50 ML: Performed by: ANESTHESIOLOGY

## 2021-11-02 PROCEDURE — 71045 X-RAY EXAM CHEST 1 VIEW: CPT

## 2021-11-02 DEVICE — FLOSEAL HEMOSTATIC MATRIX, 10ML
Type: IMPLANTABLE DEVICE | Site: CRANIAL | Status: FUNCTIONAL
Brand: FLOSEAL HEMOSTATIC MATRIX

## 2021-11-02 DEVICE — ABSORBABLE HEMOSTAT (OXIDIZED REGENERATED CELLULOSE, U.S.P.)
Type: IMPLANTABLE DEVICE | Site: CRANIAL | Status: FUNCTIONAL
Brand: SURGICEL FIBRILLAR

## 2021-11-02 DEVICE — DURAGEN® PLUS DURAL REGENERATION MATRIX , 4 IN X 5 IN
Type: IMPLANTABLE DEVICE | Site: CRANIAL | Status: FUNCTIONAL
Brand: DURAGEN® PLUS

## 2021-11-02 DEVICE — SCREW, AXS, SELF-DRILLING
Type: IMPLANTABLE DEVICE | Site: CRANIAL | Status: FUNCTIONAL
Brand: UNIVERSAL NEURO 3

## 2021-11-02 DEVICE — BURR HOLE COVER, WITH TAB, 10MM
Type: IMPLANTABLE DEVICE | Site: CRANIAL | Status: FUNCTIONAL
Brand: UNIVERSAL NEURO 3

## 2021-11-02 DEVICE — STRAIGHT PLATE, 12MM BAR
Type: IMPLANTABLE DEVICE | Site: CRANIAL | Status: FUNCTIONAL
Brand: UNIVERSAL NEURO 3

## 2021-11-02 DEVICE — ABSORBABLE HEMOSTAT (OXIDIZED REGENERATED CELLULOSE, U.S.P.)
Type: IMPLANTABLE DEVICE | Site: CRANIAL | Status: FUNCTIONAL
Brand: SURGICEL

## 2021-11-02 DEVICE — HEMOST ABS SURGIFOAM SZ100 8X12 10MM: Type: IMPLANTABLE DEVICE | Site: CRANIAL | Status: FUNCTIONAL

## 2021-11-02 RX ORDER — SODIUM CHLORIDE 0.9 % (FLUSH) 0.9 %
10 SYRINGE (ML) INJECTION AS NEEDED
Status: DISCONTINUED | OUTPATIENT
Start: 2021-11-02 | End: 2021-11-05 | Stop reason: HOSPADM

## 2021-11-02 RX ORDER — SODIUM CHLORIDE 0.9 % (FLUSH) 0.9 %
10 SYRINGE (ML) INJECTION EVERY 12 HOURS SCHEDULED
Status: DISCONTINUED | OUTPATIENT
Start: 2021-11-02 | End: 2021-11-02 | Stop reason: HOSPADM

## 2021-11-02 RX ORDER — NITROGLYCERIN 5 MG/ML
INJECTION, SOLUTION INTRAVENOUS AS NEEDED
Status: DISCONTINUED | OUTPATIENT
Start: 2021-11-02 | End: 2021-11-02 | Stop reason: SURG

## 2021-11-02 RX ORDER — SODIUM CHLORIDE, SODIUM LACTATE, POTASSIUM CHLORIDE, CALCIUM CHLORIDE 600; 310; 30; 20 MG/100ML; MG/100ML; MG/100ML; MG/100ML
9 INJECTION, SOLUTION INTRAVENOUS CONTINUOUS PRN
Status: DISCONTINUED | OUTPATIENT
Start: 2021-11-02 | End: 2021-11-02 | Stop reason: HOSPADM

## 2021-11-02 RX ORDER — ONDANSETRON 2 MG/ML
4 INJECTION INTRAMUSCULAR; INTRAVENOUS EVERY 6 HOURS PRN
Status: DISCONTINUED | OUTPATIENT
Start: 2021-11-02 | End: 2021-11-03

## 2021-11-02 RX ORDER — SODIUM CHLORIDE, SODIUM LACTATE, POTASSIUM CHLORIDE, CALCIUM CHLORIDE 600; 310; 30; 20 MG/100ML; MG/100ML; MG/100ML; MG/100ML
1000 INJECTION, SOLUTION INTRAVENOUS CONTINUOUS
Status: DISCONTINUED | OUTPATIENT
Start: 2021-11-02 | End: 2021-11-03

## 2021-11-02 RX ORDER — ALBUMIN, HUMAN INJ 5% 5 %
SOLUTION INTRAVENOUS CONTINUOUS PRN
Status: DISCONTINUED | OUTPATIENT
Start: 2021-11-02 | End: 2021-11-02 | Stop reason: SURG

## 2021-11-02 RX ORDER — SODIUM CHLORIDE 0.9 % (FLUSH) 0.9 %
10 SYRINGE (ML) INJECTION EVERY 12 HOURS SCHEDULED
Status: DISCONTINUED | OUTPATIENT
Start: 2021-11-02 | End: 2021-11-05 | Stop reason: HOSPADM

## 2021-11-02 RX ORDER — LIDOCAINE HYDROCHLORIDE 10 MG/ML
0.5 INJECTION, SOLUTION INFILTRATION; PERINEURAL ONCE AS NEEDED
Status: DISCONTINUED | OUTPATIENT
Start: 2021-11-02 | End: 2021-11-02 | Stop reason: HOSPADM

## 2021-11-02 RX ORDER — MANNITOL 20 G/100ML
60 INJECTION, SOLUTION INTRAVENOUS CONTINUOUS PRN
Status: ACTIVE | OUTPATIENT
Start: 2021-11-02 | End: 2021-11-02

## 2021-11-02 RX ORDER — HYDROMORPHONE HCL 110MG/55ML
PATIENT CONTROLLED ANALGESIA SYRINGE INTRAVENOUS AS NEEDED
Status: DISCONTINUED | OUTPATIENT
Start: 2021-11-02 | End: 2021-11-02 | Stop reason: SURG

## 2021-11-02 RX ORDER — LIDOCAINE HYDROCHLORIDE AND EPINEPHRINE 10; 10 MG/ML; UG/ML
INJECTION, SOLUTION INFILTRATION; PERINEURAL AS NEEDED
Status: DISCONTINUED | OUTPATIENT
Start: 2021-11-02 | End: 2021-11-02 | Stop reason: HOSPADM

## 2021-11-02 RX ORDER — FLUCONAZOLE 2 MG/ML
400 INJECTION, SOLUTION INTRAVENOUS EVERY 24 HOURS
Status: DISCONTINUED | OUTPATIENT
Start: 2021-11-02 | End: 2021-11-04

## 2021-11-02 RX ORDER — DROPERIDOL 2.5 MG/ML
0.62 INJECTION, SOLUTION INTRAMUSCULAR; INTRAVENOUS ONCE AS NEEDED
Status: DISCONTINUED | OUTPATIENT
Start: 2021-11-02 | End: 2021-11-02 | Stop reason: HOSPADM

## 2021-11-02 RX ORDER — HYDROMORPHONE HCL 110MG/55ML
0.5 PATIENT CONTROLLED ANALGESIA SYRINGE INTRAVENOUS
Status: DISCONTINUED | OUTPATIENT
Start: 2021-11-02 | End: 2021-11-02 | Stop reason: HOSPADM

## 2021-11-02 RX ORDER — ONDANSETRON 2 MG/ML
4 INJECTION INTRAMUSCULAR; INTRAVENOUS ONCE AS NEEDED
Status: DISCONTINUED | OUTPATIENT
Start: 2021-11-02 | End: 2021-11-02 | Stop reason: HOSPADM

## 2021-11-02 RX ORDER — LEVETIRACETAM 5 MG/ML
500 INJECTION INTRAVASCULAR EVERY 12 HOURS SCHEDULED
Status: DISCONTINUED | OUTPATIENT
Start: 2021-11-02 | End: 2021-11-03

## 2021-11-02 RX ORDER — FAMOTIDINE 20 MG/1
20 TABLET, FILM COATED ORAL EVERY 12 HOURS SCHEDULED
Status: DISCONTINUED | OUTPATIENT
Start: 2021-11-02 | End: 2021-11-02 | Stop reason: SDUPTHER

## 2021-11-02 RX ORDER — NALOXONE HCL 0.4 MG/ML
0.4 VIAL (ML) INJECTION
Status: DISCONTINUED | OUTPATIENT
Start: 2021-11-02 | End: 2021-11-05 | Stop reason: HOSPADM

## 2021-11-02 RX ORDER — PANTOPRAZOLE SODIUM 40 MG/1
40 TABLET, DELAYED RELEASE ORAL EVERY MORNING
Status: DISCONTINUED | OUTPATIENT
Start: 2021-11-03 | End: 2021-11-03

## 2021-11-02 RX ORDER — DEXAMETHASONE 4 MG/1
4 TABLET ORAL EVERY 6 HOURS SCHEDULED
Status: DISCONTINUED | OUTPATIENT
Start: 2021-11-02 | End: 2021-11-05 | Stop reason: HOSPADM

## 2021-11-02 RX ORDER — MORPHINE SULFATE 4 MG/ML
2 INJECTION, SOLUTION INTRAMUSCULAR; INTRAVENOUS
Status: DISCONTINUED | OUTPATIENT
Start: 2021-11-02 | End: 2021-11-03

## 2021-11-02 RX ORDER — FENTANYL CITRATE 50 UG/ML
INJECTION, SOLUTION INTRAMUSCULAR; INTRAVENOUS AS NEEDED
Status: DISCONTINUED | OUTPATIENT
Start: 2021-11-02 | End: 2021-11-02 | Stop reason: SURG

## 2021-11-02 RX ORDER — HYDROMORPHONE HCL 110MG/55ML
PATIENT CONTROLLED ANALGESIA SYRINGE INTRAVENOUS AS NEEDED
Status: DISCONTINUED | OUTPATIENT
Start: 2021-11-02 | End: 2021-11-02

## 2021-11-02 RX ORDER — MORPHINE SULFATE 4 MG/ML
2 INJECTION, SOLUTION INTRAMUSCULAR; INTRAVENOUS
Status: DISCONTINUED | OUTPATIENT
Start: 2021-11-02 | End: 2021-11-02 | Stop reason: HOSPADM

## 2021-11-02 RX ORDER — PHENYLEPHRINE HCL IN 0.9% NACL 1 MG/10 ML
SYRINGE (ML) INTRAVENOUS AS NEEDED
Status: DISCONTINUED | OUTPATIENT
Start: 2021-11-02 | End: 2021-11-02 | Stop reason: SURG

## 2021-11-02 RX ORDER — SUCRALFATE 1 G/1
1 TABLET ORAL
Status: DISCONTINUED | OUTPATIENT
Start: 2021-11-02 | End: 2021-11-05 | Stop reason: HOSPADM

## 2021-11-02 RX ORDER — ROCURONIUM BROMIDE 10 MG/ML
INJECTION, SOLUTION INTRAVENOUS AS NEEDED
Status: DISCONTINUED | OUTPATIENT
Start: 2021-11-02 | End: 2021-11-02 | Stop reason: SURG

## 2021-11-02 RX ORDER — PROMETHAZINE HYDROCHLORIDE 25 MG/1
25 SUPPOSITORY RECTAL ONCE AS NEEDED
Status: DISCONTINUED | OUTPATIENT
Start: 2021-11-02 | End: 2021-11-02 | Stop reason: HOSPADM

## 2021-11-02 RX ORDER — SODIUM CHLORIDE 9 MG/ML
INJECTION, SOLUTION INTRAVENOUS CONTINUOUS PRN
Status: DISCONTINUED | OUTPATIENT
Start: 2021-11-02 | End: 2021-11-02 | Stop reason: SURG

## 2021-11-02 RX ORDER — PROMETHAZINE HYDROCHLORIDE 12.5 MG/1
12.5 TABLET ORAL EVERY 6 HOURS PRN
Status: DISCONTINUED | OUTPATIENT
Start: 2021-11-02 | End: 2021-11-05 | Stop reason: HOSPADM

## 2021-11-02 RX ORDER — ONDANSETRON 2 MG/ML
INJECTION INTRAMUSCULAR; INTRAVENOUS AS NEEDED
Status: DISCONTINUED | OUTPATIENT
Start: 2021-11-02 | End: 2021-11-02 | Stop reason: SURG

## 2021-11-02 RX ORDER — MIDAZOLAM HYDROCHLORIDE 1 MG/ML
INJECTION INTRAMUSCULAR; INTRAVENOUS AS NEEDED
Status: DISCONTINUED | OUTPATIENT
Start: 2021-11-02 | End: 2021-11-02 | Stop reason: SURG

## 2021-11-02 RX ORDER — ALBUTEROL SULFATE 90 UG/1
2 AEROSOL, METERED RESPIRATORY (INHALATION) EVERY 4 HOURS PRN
Status: DISCONTINUED | OUTPATIENT
Start: 2021-11-02 | End: 2021-11-05 | Stop reason: HOSPADM

## 2021-11-02 RX ORDER — SODIUM CHLORIDE, SODIUM LACTATE, POTASSIUM CHLORIDE, CALCIUM CHLORIDE 600; 310; 30; 20 MG/100ML; MG/100ML; MG/100ML; MG/100ML
100 INJECTION, SOLUTION INTRAVENOUS CONTINUOUS
Status: DISCONTINUED | OUTPATIENT
Start: 2021-11-02 | End: 2021-11-03

## 2021-11-02 RX ORDER — DEXAMETHASONE SODIUM PHOSPHATE 4 MG/ML
4 INJECTION, SOLUTION INTRA-ARTICULAR; INTRALESIONAL; INTRAMUSCULAR; INTRAVENOUS; SOFT TISSUE EVERY 6 HOURS SCHEDULED
Status: DISCONTINUED | OUTPATIENT
Start: 2021-11-02 | End: 2021-11-05 | Stop reason: HOSPADM

## 2021-11-02 RX ORDER — FAMOTIDINE 10 MG/ML
20 INJECTION, SOLUTION INTRAVENOUS EVERY 12 HOURS SCHEDULED
Status: DISCONTINUED | OUTPATIENT
Start: 2021-11-02 | End: 2021-11-02 | Stop reason: SDUPTHER

## 2021-11-02 RX ORDER — SODIUM CHLORIDE 0.9 % (FLUSH) 0.9 %
10 SYRINGE (ML) INJECTION AS NEEDED
Status: DISCONTINUED | OUTPATIENT
Start: 2021-11-02 | End: 2021-11-02 | Stop reason: HOSPADM

## 2021-11-02 RX ORDER — PROPOFOL 10 MG/ML
VIAL (ML) INTRAVENOUS AS NEEDED
Status: DISCONTINUED | OUTPATIENT
Start: 2021-11-02 | End: 2021-11-02 | Stop reason: SURG

## 2021-11-02 RX ORDER — ONDANSETRON 4 MG/1
4 TABLET, FILM COATED ORAL EVERY 6 HOURS PRN
Status: DISCONTINUED | OUTPATIENT
Start: 2021-11-02 | End: 2021-11-03

## 2021-11-02 RX ORDER — DEXAMETHASONE SODIUM PHOSPHATE 4 MG/ML
INJECTION, SOLUTION INTRA-ARTICULAR; INTRALESIONAL; INTRAMUSCULAR; INTRAVENOUS; SOFT TISSUE AS NEEDED
Status: DISCONTINUED | OUTPATIENT
Start: 2021-11-02 | End: 2021-11-02 | Stop reason: SURG

## 2021-11-02 RX ORDER — GINSENG 100 MG
CAPSULE ORAL AS NEEDED
Status: DISCONTINUED | OUTPATIENT
Start: 2021-11-02 | End: 2021-11-02 | Stop reason: HOSPADM

## 2021-11-02 RX ORDER — PROMETHAZINE HYDROCHLORIDE 25 MG/1
25 TABLET ORAL ONCE AS NEEDED
Status: DISCONTINUED | OUTPATIENT
Start: 2021-11-02 | End: 2021-11-02 | Stop reason: HOSPADM

## 2021-11-02 RX ORDER — SODIUM CHLORIDE, SODIUM LACTATE, POTASSIUM CHLORIDE, AND CALCIUM CHLORIDE .6; .31; .03; .02 G/100ML; G/100ML; G/100ML; G/100ML
INJECTION, SOLUTION INTRAVENOUS AS NEEDED
Status: DISCONTINUED | OUTPATIENT
Start: 2021-11-02 | End: 2021-11-02 | Stop reason: HOSPADM

## 2021-11-02 RX ORDER — ENALAPRILAT 2.5 MG/2ML
1.25 INJECTION INTRAVENOUS EVERY 6 HOURS PRN
Status: DISCONTINUED | OUTPATIENT
Start: 2021-11-02 | End: 2021-11-05 | Stop reason: HOSPADM

## 2021-11-02 RX ADMIN — SODIUM CHLORIDE, POTASSIUM CHLORIDE, SODIUM LACTATE AND CALCIUM CHLORIDE 9 ML/HR: 600; 310; 30; 20 INJECTION, SOLUTION INTRAVENOUS at 07:06

## 2021-11-02 RX ADMIN — NICARDIPINE HYDROCHLORIDE 5 MG/HR: 25 INJECTION, SOLUTION INTRAVENOUS at 20:35

## 2021-11-02 RX ADMIN — ALBUMIN HUMAN: 0.05 INJECTION, SOLUTION INTRAVENOUS at 13:07

## 2021-11-02 RX ADMIN — HYDROMORPHONE HYDROCHLORIDE 2 MG: 2 INJECTION INTRAMUSCULAR; INTRAVENOUS; SUBCUTANEOUS at 13:25

## 2021-11-02 RX ADMIN — FLUCONAZOLE, SODIUM CHLORIDE 400 MG: 2 INJECTION INTRAVENOUS at 17:56

## 2021-11-02 RX ADMIN — ONDANSETRON 4 MG: 2 INJECTION INTRAMUSCULAR; INTRAVENOUS at 13:41

## 2021-11-02 RX ADMIN — FENTANYL CITRATE 100 MCG: 50 INJECTION, SOLUTION INTRAMUSCULAR; INTRAVENOUS at 09:14

## 2021-11-02 RX ADMIN — PROPOFOL 200 MG: 10 INJECTION, EMULSION INTRAVENOUS at 08:09

## 2021-11-02 RX ADMIN — SODIUM CHLORIDE, POTASSIUM CHLORIDE, SODIUM LACTATE AND CALCIUM CHLORIDE 100 ML/HR: 600; 310; 30; 20 INJECTION, SOLUTION INTRAVENOUS at 16:15

## 2021-11-02 RX ADMIN — SODIUM CHLORIDE, POTASSIUM CHLORIDE, SODIUM LACTATE AND CALCIUM CHLORIDE: 600; 310; 30; 20 INJECTION, SOLUTION INTRAVENOUS at 13:34

## 2021-11-02 RX ADMIN — HYDROMORPHONE HYDROCHLORIDE 2 MG: 2 INJECTION INTRAMUSCULAR; INTRAVENOUS; SUBCUTANEOUS at 09:08

## 2021-11-02 RX ADMIN — CEFAZOLIN SODIUM 2 G: 1 INJECTION, POWDER, FOR SOLUTION INTRAMUSCULAR; INTRAVENOUS at 12:11

## 2021-11-02 RX ADMIN — MANNITOL: 20 INJECTION, SOLUTION INTRAVENOUS at 09:19

## 2021-11-02 RX ADMIN — LEVETIRACETAM 500 MG: 5 INJECTION INTRAVASCULAR at 20:42

## 2021-11-02 RX ADMIN — ENALAPRILAT 1.25 MG: 2.5 INJECTION INTRAVENOUS at 17:57

## 2021-11-02 RX ADMIN — MIDAZOLAM 2 MG: 1 INJECTION INTRAMUSCULAR; INTRAVENOUS at 08:03

## 2021-11-02 RX ADMIN — SODIUM CHLORIDE, PRESERVATIVE FREE 10 ML: 5 INJECTION INTRAVENOUS at 20:42

## 2021-11-02 RX ADMIN — Medication 100 MCG: at 13:29

## 2021-11-02 RX ADMIN — NITROGLYCERIN 100 MCG: 5 INJECTION, SOLUTION INTRAVENOUS at 09:22

## 2021-11-02 RX ADMIN — FENTANYL CITRATE 100 MCG: 50 INJECTION, SOLUTION INTRAMUSCULAR; INTRAVENOUS at 08:07

## 2021-11-02 RX ADMIN — DEXAMETHASONE SODIUM PHOSPHATE 4 MG: 4 INJECTION, SOLUTION INTRAMUSCULAR; INTRAVENOUS at 23:58

## 2021-11-02 RX ADMIN — MORPHINE SULFATE 2 MG: 4 INJECTION, SOLUTION INTRAMUSCULAR; INTRAVENOUS at 22:24

## 2021-11-02 RX ADMIN — ROCURONIUM BROMIDE 30 MG: 10 INJECTION INTRAVENOUS at 13:22

## 2021-11-02 RX ADMIN — NITROGLYCERIN 100 MCG: 5 INJECTION, SOLUTION INTRAVENOUS at 09:10

## 2021-11-02 RX ADMIN — ROCURONIUM BROMIDE 50 MG: 10 INJECTION INTRAVENOUS at 08:10

## 2021-11-02 RX ADMIN — ROCURONIUM BROMIDE 40 MG: 10 INJECTION INTRAVENOUS at 08:44

## 2021-11-02 RX ADMIN — ALBUMIN HUMAN: 0.05 INJECTION, SOLUTION INTRAVENOUS at 12:14

## 2021-11-02 RX ADMIN — SODIUM CHLORIDE: 0.9 INJECTION, SOLUTION INTRAVENOUS at 08:26

## 2021-11-02 RX ADMIN — CEFAZOLIN SODIUM 2 G: 1 INJECTION, POWDER, FOR SOLUTION INTRAMUSCULAR; INTRAVENOUS at 08:11

## 2021-11-02 RX ADMIN — DEXAMETHASONE SODIUM PHOSPHATE 12 MG: 4 INJECTION, SOLUTION INTRAMUSCULAR; INTRAVENOUS at 08:37

## 2021-11-02 RX ADMIN — DEXAMETHASONE SODIUM PHOSPHATE 4 MG: 4 INJECTION, SOLUTION INTRAMUSCULAR; INTRAVENOUS at 17:57

## 2021-11-02 RX ADMIN — SUGAMMADEX 200 MG: 100 INJECTION, SOLUTION INTRAVENOUS at 14:15

## 2021-11-02 RX ADMIN — FENTANYL CITRATE 100 MCG: 50 INJECTION, SOLUTION INTRAMUSCULAR; INTRAVENOUS at 08:35

## 2021-11-02 NOTE — CONSULTS
PULMONARY/CRITICAL CARE CONSULT NOTE     PATIENT NAME:  Yoan Salas       :  1980      MRN:  [id number]      ROOM:  [facility] OR [facility] MAIN OR/MAIN OR     PRIMARY CARE PROVIDER  Christa Padron PA    REFERRING PROVIDER     Dr. Jean    REASON FOR CONSULTATION  Critical care management    SUBJECTIVE     CHIEF COMPLAINT:   Speech issues    HISTORY OF PRESENT ILLNESS:  Yoan Salas is a 41 y.o. male  has a past medical history of Allergic, Anxiety, Asthma, Cancer (HCC), Depression, GERD (gastroesophageal reflux disease), and Hypertension.   Patient presented to [facility] for planned elective craniotomy on 2021 by Dr. Jean. Preoperative work-up revealed that patient had some word finding difficulty over the past several months that had became more severe. He also had admitted to some difficulty reading and some mild memory problems. Per patient's wife, he reportedly sometimes gets the wrong word and also gives the example that he is confused that there are both male and female pet cats. Patient attributed this to anxiety initially and was started on Prozac. Now that he was found with the diagnosis of meningioma, he states that he does not feel like he is depressed and is working with his family doctor to wean that medication. 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 Patient is with known mild reflux. He denied chest pain, shortness of breath but did admit to some mild occasional headaches. He denied any visual problems, numbness, weakness, incoordination or balance problems. Patient does not take any blood thinners, and is a non-smoker. Preoperative evaluation revealed an MRI of the brain with and without contrast that revealed a 4.9 cm dural based mass in his left parietal area with significant mass-effect and edema. It was recommended for patient to undergo craniotomy with tumor resection.       Patient on 11/2 underwent left temporoparietal craniotomy for resection of large meningioma by Dr. Jean with noted approximate blood loss of 1200 mL, with pathology pending. Patient had one subgaleal Hemovac placed. Patient was successfully extubated in PACU, and Pulmonary/Intensivist consultation was requested for frequent neurologic monitoring while in ICU. Patient is currently recently postoperative, confused, moving all extremities but having difficulty with following commands at this time. Patient does know his name, but is answering yes and no questions variably, but feel this is most likely medication related versus an actual acute neurologic change, as these are the same behaviors and answers that patient was reporting while in PACU. We will closely monitor, neurosurgeon is aware.    Thank you for this consultation, we will follow along on this patient.        REVIEW OF SYSTEMS:  Review of systems could not be obtained due to   patient confusion.      ASSESSMENT     Principal Problem:    Mass of temporoparietal region of brain  Active Problems:    Mild confusion, with speech issues and memory issues, related to brain mass    Hyperglycemia    GERD (gastroesophageal reflux disease)    Essential hypertension    Oral thrush    Class 3 severe obesity with body mass index (BMI) of 40.0 to 44.9 in adult       PLAN     Mass of temporoparietal region of brain  Mild confusion, with speech issues and memory issues, related to brain mass  -Underwent pre-hospital work-up, presented for planned craniotomy with tumor resection of 4.9 cm temporoparietal mass, suspected meningioma  -Analgesia per neurosurgery recommendations.  -Neuro assessment per neurosurgery recommendations.  -Await pathology results    Hyperglycemia  -Likely secondary to steroid use  -Check hemoglobin A1c  -strict glycemic control recommended  -Accuchecks AC/HS or Q6H with sliding scale insulin, based on diet    Essential hypertension  -Closely  "monitor patient's blood pressure, resume home medication regimen when appropriate  -Home regimen includes: Norvasc    Oral thrush  -Diflucan ordered per Neurosurgery  -Consider switching to Nystatin    GERD (gastroesophageal reflux disease)  -Continue PPI, Carafate    Class 3 severe obesity with body mass index (BMI) of 40.0 to 44.9 in adult  -Complicating aspects of care  - on lifestyle modifications to improve overall health    Code Status: CPR, Full Interventions  VTE Prophylaxis: SCDs  PUD Prophylaxis: PPI      HOSPITAL MEDICATIONS     SCHEDULED MEDICATIONS:  dexamethasone, 4 mg, Oral, Q6H   Or  dexamethasone, 4 mg, Intravenous, Q6H  fluconazole, 400 mg, Intravenous, Q24H  insulin lispro, 0-7 Units, Subcutaneous, 4x Daily With Meals & Nightly  levETIRAcetam, 500 mg, Intravenous, Q12H  pantoprazole, 40 mg, Oral, QAM  sodium chloride, 10 mL, Intravenous, Q12H  sodium chloride, 10 mL, Intravenous, Q12H  sucralfate, 1 g, Oral, 4x Daily AC & at Bedtime         CONTINUOUS INFUSIONS:    lactated ringers, 1,000 mL  lactated ringers, 1,000 mL  lactated ringers, 9 mL/hr, Last Rate: 9 mL/hr (11/02/21 1334)  lactated ringers, 100 mL/hr         PRN MEDICATIONS:   •  albuterol sulfate HFA  •  droperidol **OR** droperidol  •  HYDROmorphone  •  lactated ringers  •  morphine  •  Morphine **AND** naloxone  •  ondansetron  •  ondansetron **OR** ondansetron  •  promethazine **OR** promethazine  •  promethazine  •  sodium chloride       OBJECTIVE     VITAL SIGNS:  BP (!) 139/102   Pulse 111   Temp 97.8 °F (36.6 °C) (Tympanic)   Resp 14   Ht 167.6 cm (66\")   Wt 120 kg (265 lb 3.2 oz)   SpO2 93%   BMI 42.80 kg/m²     Wt Readings from Last 3 Encounters:   11/02/21 120 kg (265 lb 3.2 oz)   10/18/21 123 kg (272 lb)   10/26/21 125 kg (275 lb)       INTAKE/OUTPUT:  Intake/Output                 11/02/21 0701 - 11/03/21 0700     [id number] Total              Intake    I.V.  3000  -- 3000    Blood  600  -- 600    " Volume (Transfuse RBC) 300 -- 300    Volume (Transfuse RBC) 300 -- 300    IV Piggyback  800  -- 800    Total Intake 4400 -- 4400       Output    Urine  750  -- 750    Blood  1000  -- 1000    Total Output 1750 -- 1750           NET INTAKE/OUTPUT SINCE ADMISSION:  Net IO Since Admission: 2,650 mL [11/02/21 1512]     PHYSICAL EXAM:   Constitutional:  Well developed, well nourished, no acute distress, non-toxic appearance   Eyes:  PERRL, conjunctiva normal, EOMI   HENT: Craniotomy dressing in place, Hemovac noted with bloody drainage, external ears normal, nose normal, oropharynx moist, no pharyngeal exudates. Neck-wide and thick with normal range of motion, no tenderness, supple, trachea midline  Respiratory: Diminished bibasilar breath sounds, without rhonchi or wheezes, non-labored respirations without accessory muscle use  Cardiovascular: Elevated rate, normal rhythm, no murmurs, no gallops, no rubs   GI:  Soft, nondistended, normal bowel sounds, nontender, no organomegaly, no mass, no rebound, no guarding   :  No costovertebral angle tenderness   Musculoskeletal:  No edema, no tenderness, no deformities  Integument:  Well hydrated, no rash   Lymphatic:  No lymphadenopathy noted   Neurologic: Drowsy, oriented x1, confused conversationally, CN 2-12 normal, normal motor function but not following consistent commands, likely due to and recent postoperative. No gross focal deficits noted.  Psychiatric: Conversationally confused, difficult to assess, calm, cooperative      HISTORY     HISTORY:  Past Medical History:   Diagnosis Date   • Allergic    • Anxiety    • Asthma    • Cancer (HCC)     ALL as child    • Depression    • GERD (gastroesophageal reflux disease)    • Hypertension      Past Surgical History:   Procedure Laterality Date   • HERNIA REPAIR      7 MONTHS OLD   • PORTACATH PLACEMENT      CHILDHOOD     Family History   Problem Relation Age of Onset   • Cancer Maternal Grandfather      Social History      Socioeconomic History   • Marital status:    Tobacco Use   • Smoking status: Never Smoker   • Smokeless tobacco: Never Used   Vaping Use   • Vaping Use: Never used   Substance and Sexual Activity   • Alcohol use: Yes     Comment: Weekends   • Drug use: Never   • Sexual activity: Defer        HOME MEDICATIONS:   Prior to Admission medications    Medication Sig Start Date End Date Taking? Authorizing Provider   albuterol sulfate  (90 Base) MCG/ACT inhaler Inhale 2 puffs Every 4 (Four) Hours As Needed for Wheezing.   Yes Crescencio Vazquez MD   amLODIPine (NORVASC) 10 MG tablet Take 1 tablet by mouth Daily.  Patient taking differently: Take 10 mg by mouth Daily. Take DOS 10/27/21  Yes Christa Padron PA   dexamethasone (DECADRON) 4 MG tablet Take 4 mg by mouth 4 (Four) Times a Day. 10/14/21  Yes Crescencio Vazquez MD   famotidine (PEPCID) 20 MG tablet Take 20 mg by mouth 2 (Two) Times a Day. Take DOS   Yes Crescencio Vazquez MD   levETIRAcetam (KEPPRA) 500 MG tablet Take 500 mg by mouth 2 (two) times a day. Take DOS 10/14/21  Yes Crescencio Vazquez MD   omeprazole (priLOSEC) 40 MG capsule Take 40 mg by mouth Daily. Take DOS   Yes Crescencio Vazquez MD   sucralfate (Carafate) 1 g tablet Take 1 tablet by mouth 4 (Four) Times a Day. 10/29/21  Yes Christa Padron PA         IMMUNIZATIONS:    There is no immunization history on file for this patient.     ALLERGIES:  Patient has no known allergies.      RESULTS     LABS:  Lab Results (last 24 hours)     Procedure Component Value Units Date/Time    POC Glucose Once [[id number]]  (Abnormal) Collected: 11/02/21 0632    Specimen: Blood Updated: 11/02/21 0633     Glucose 162 mg/dL      Comment: Serial Number: [id number]:  [id number]       Tissue Pathology Exam [[id number]] Collected: 11/02/21 1010    Specimen: Tissue from Head, Tissue from Head Updated: 11/02/21 1033     Case Report --     Surgical Pathology Report                         Case:  [id number]                                  Authorizing Provider:  Francois Jean MD  Collected:           11/02/2021 10:10 AM          Ordering Location:     [facility] MAIN  Received:            11/02/2021 10:17 AM                                 OR                                                                           Pathologist:           Ernesto Lal MD                                                             Intraop:               Ernesto Lal MD                                                             Specimen:    Head, parietal tumor                                                                        Intraoperative Consultation --     1FA. Parietal tumor, left, biopsy:     DX: Fibroblastic meningioma            No necrosis or mitotic figures identified    Ernesto Lal MD      POC Chem 8, arterial (ISTAT) [[id number]]  (Abnormal) Collected: 11/02/21 1025    Specimen: Arterial Blood Updated: 11/02/21 1457     Ionized Calcium 1.08 mmol/L      pH, Arterial 7.360 pH units      pCO2, Arterial 43.3 mm Hg      pO2, Arterial 198.0 mm Hg      HCO3, Arterial 24.4 mmol/L      Base Excess, Arterial <0.0 mmol/L      Base Deficit --     O2 Saturation, Arterial 100.0 %      Glucose 192 mg/dL      Comment: Serial Number: [id number]:  [id number]        Sodium 127 mmol/L      POC Potassium 4.7 mmol/L      Hematocrit 32 %      Hemoglobin 10.9 g/dL      CO2 Content 26 mmol/L     POC Chem 8, arterial (ISTAT) [[id number]]  (Abnormal) Collected: 11/02/21 1208    Specimen: Arterial Blood Updated: 11/02/21 1457     Ionized Calcium 1.06 mmol/L      pH, Arterial 7.280 pH units      pCO2, Arterial 51.8 mm Hg      pO2, Arterial 238.0 mm Hg      HCO3, Arterial 24.5 mmol/L      Base Excess, Arterial <0.0 mmol/L      Base Deficit --     O2 Saturation, Arterial 100.0 %      Glucose 185 mg/dL      Comment: Serial Number: [id number]:  [id number]        Sodium 130 mmol/L      POC Potassium 5.2 mmol/L       Hematocrit 35 %      Hemoglobin 11.9 g/dL      CO2 Content 26 mmol/L     POC Chem 8, arterial (ISTAT) [[id number]]  (Abnormal) Collected: 11/02/21 1336    Specimen: Arterial Blood Updated: 11/02/21 1458     Ionized Calcium 1.03 mmol/L      pH, Arterial 7.350 pH units      pCO2, Arterial 45.2 mm Hg      pO2, Arterial 261.0 mm Hg      HCO3, Arterial 24.8 mmol/L      Base Excess, Arterial <0.0 mmol/L      Base Deficit --     O2 Saturation, Arterial 100.0 %      Glucose 169 mg/dL      Comment: Serial Number: [id number]:  [id number]        Sodium 132 mmol/L      POC Potassium 4.8 mmol/L      Hematocrit 26 %      Hemoglobin 8.8 g/dL      CO2 Content 26 mmol/L             MICRO:  Microbiology Results (last 10 days)     Procedure Component Value - Date/Time    COVID PRE-OP / PRE-PROCEDURE SCREENING ORDER (NO ISOLATION) - Swab, Nasopharynx [[id number]]  (Normal) Collected: 10/30/21 1052    Lab Status: Final result Specimen: Swab from Nasopharynx Updated: 10/31/21 0110    Narrative:      The following orders were created for panel order COVID PRE-OP / PRE-PROCEDURE SCREENING ORDER (NO ISOLATION) - Swab, Nasopharynx.  Procedure                               Abnormality         Status                     ---------                               -----------         ------                     COVID-19,APTIMA PANTHER(...[[id number]]  Normal              Final result                 Please view results for these tests on the individual orders.    COVID-19,APTIMA PANTHER(VIKRAM), MEGAN, NP/OP SWAB IN UTM/VTM/SALINE TRANSPORT MEDIA,24 HR TAT - Swab, Nasopharynx [[id number]]  (Normal) Collected: 10/30/21 1052    Lab Status: Final result Specimen: Swab from Nasopharynx Updated: 10/31/21 0110     COVID19 Not Detected    Narrative:      Fact sheet for providers: https://www.fda.gov/media/966713/download     Fact sheet for patients: https://www.fda.gov/media/782099/download    Test performed by RT PCR.    MRSA Screen, PCR (Inpatient) - Swab,  Nares [[id number]]  (Normal) Collected: 10/26/21 1007    Lab Status: Final result Specimen: Swab from Nares Updated: 10/26/21 1151     MRSA PCR No MRSA Detected            RADIOLOGY STUDIES:  Imaging Results (Last 72 Hours)     Procedure Component Value Units Date/Time    XR Chest 1 View [[id number]] Collected: 11/02/21 1508     Updated: 11/02/21 1511    Narrative:      DATE OF EXAM:  11/2/2021 3:05 PM     PROCEDURE:  XR CHEST 1 VW-     INDICATIONS:  Post-Op; D49.6-Neoplasm of unspecified behavior of brain       COMPARISON:  PA and lateral chest 10/26/2021.     TECHNIQUE:   Single radiographic view of the chest was obtained.     FINDINGS:  Low volume inspiration. Heart size appears moderately enlarged, which  may be accentuated by the semierect positioning and the portable  technique. No definite acute airspace disease is identified. Right  internal jugular central line extends to the lower SVC level. No visible  pneumothorax.       Impression:         1. Cardiac mediastinal silhouette appears moderately enlarged. This  could be accentuated by the semierect positioning and portable  technique. Consider repeat imaging with the patient is able.  2. Right IJ central line tip terminates at the mid SVC level. No visible  pneumothorax.     Electronically Signed By-Sonia Souza MD On:11/2/2021 3:09 PM  This report was finalized on [id number] by  Sonia Souza MD.              ECHOCARDIOGRAM:  No previous echocardiogram for review       I reviewed the patient's new clinical results.    I discussed the patient's findings and my recommendations with patient and nursing staff.  I have discussed plan with the attending Pulmonary/Intensivist physician, who agrees with this plan of care.    Appropriate PPE worn during assessment of patient per established guidelines.      Electronically signed by HI Hayes, 11/02/21, 3:12 PM EDT.

## 2021-11-02 NOTE — PLAN OF CARE
Patient was transferred to ICU from PACU. Patient is remains drowsy from sedation medication in OR. Patient a/o to person and time, follows commands appropriately. Per Dr. Jean, BP goal of <150 systolic. Q6h PRN vasotec med for HTN. VSS.  Spouse at bedside and involved in care. We will continue to monitor for changes.

## 2021-11-02 NOTE — OP NOTE
Preop diagnosis: Large dural based left temporoparietal tumor probable meningioma    Postop diagnosis: Same    Procedure: Left temporoparietal craniotomy for resection of large meningioma using intraoperative MRI guided intradural stereotaxis    Surgeon: Ted    Assist: Tani Braun    Assistant necessary to help with exposure retraction and handling instruments during microdissection    Anesthesia: General endotracheal    Blood loss: Approximately 1200 cc    Specimen to pathology for frozen and permanent section.  Frozen results meningioma    Complications none known    Drains 1 subgaleal Hemovac    Indications: This man had progressive problems with memory and speech.  Subsequent MRI showed extremely large meningioma with mass-effect in the left temporal parietal area after extensive counseling regarding the risks alternatives prognosis and recovery he elected to proceed with surgical intervention    Description: Under general endotracheal anesthesia patient was positioned in a Manning head pitts.  A gel roll was used to bump the torso's slightly to the left.  Stereo tactic registration was carried out and checked against known external landmarks and found to be accurate.  Using stereotaxis the extent and location of the tumor along with the trajectory and optimal placement of incision was determined.  The area was then shaved prepped and draped in usual fashion.  Stereotaxis was used to once again confirmed the approach.  An inverted U-shaped incision was fashioned.  The scalp and temporalis muscle were reflected inferiorly and held in place over a bacitracin soaked roll with fishhooks.  Stereotaxis was once again used to determine optimal site for bur holes and the bone flap.  Multiple bur holes were created with a .  Kerrison punch was used to enlarge the bur holes and a #3 Penfield was used to dissect the dura away from the inner table of the skull.  The bone flap was removed and soaked in  antibiotic solution.  There was copious bleeding from the area of the transverse/sigmoid sinus.  This was controlled with Surgicel fibrillar hemostatic agent and pressure held with the Ray-Marie sponge.  The dura was incised.  A fine toothed forcep was used to elevate the dura and complete dural incision was made.  The dura was quite vascular and was cauterized with bipolar electrocautery using a cotton kt to protect the cortical surface.  The dura was densely attached to the tumor it was heavily cauterized and reflected inferiorly.  There is severe mass-effect with extreme edema of the brain adjacent to the tumor.  The surface of the tumor was incised and tumor debulked from internally.  Tumor was fairly friable and it was possible to debulk the tumor internally using a combination of suction electrocautery with the bipolar and the cusa device.  Once the tumor was sufficiently debulked, a plane was developed superiorly.  The tumor was retracted away from the brain and the brain was not retracted.  The dissection plane was maintained with the use of cotton patties.  Frequent use of stereotaxis confirm depth and location and was utilized to confirm the extent of debulking.  Plane was established and continued anteriorly.  The tumor severely undercut the surface of the frontal portion of the cavity.  Once the tumor was debulked it was possible to deliver the tumor into the field and maintain the plane between the tumor and the brain anteriorly once again retracting the tumor and not the brain.  There was fairly copious bleeding from tumor vessels in multiple location.  This was controlled with bipolar electrocautery use of cotton patties and FloSeal hemostatic agent.  The plane was then developed posteriorly once again using cotton patties to maintain the dissection plane between the tumor and the brain.  The surface of the tumor was cauterized and feeding vessels cauterized and incised.  Eventually was possible to  establish a plane inferiorly.  The tumor extended all the way down to the tentorium and it appeared that it was possibly partially based on the tentorium.  The area of contact with the tumor in the tent was heavily cauterized.  Once the tumor was debulked there was additional bleeding from the area of the sinus.  This was once again brought under control using Surgicel fibrillar hemostatic agent.  Once cleared dissection plane had been established and the tumor debulked, it was possible to roll the tumor from inferior to superior and establish a deep dissection plane.  As this was carried out, additional debulking was performed, and multiple large fragments of tumor were sent to pathology for frozen and permanent section.  Frozen section returned fibrous meningioma without malignant features.  Permanent section is pending.  In the deep dissection plane, several very large vessels were identified along the cortical surface these were carefully protected with cotton patties in the surface of the tumor was heavily cauterized.  Eventually it was possible to roll the tumor in all directions and completely dissected free of its deep attachments.  The residual tumor was then removed.  Under high loupe magnification the tumor bed was carefully inspected.  Small bleeding points were cauterized with bipolar electrocautery there did not appear to be any residual tumor.  The sites of contact with the tumor and the tentorium were heavily cauterized with bipolar electrocautery and the dura was excised adjacent to the tumor leaving a margin of at least a centimeter in all directions except where the tumor had contacted the tentorium and the lateral area adjacent to the sinus.  The dissection cavity was irrigated with copious irrigation which returned clear.  The field was completely dry.  In addition complete hemostasis adjacent to the edges of the bone flap was achieved using FloSeal and fibrillar hemostatic agent.  Irrigation  returned completely clear with and the whole entire field was free of bleeding.  A large sheet of DuraGen dural replacement was cut to the appropriate size and used to cover the dural defect.  Gelfoam was then used to also fill the defect where the bone flap had been removed.  The bone flap was reapproximated using a micro plate system with bur hole covers and a dog bone plate.  The entire field was thoroughly irrigated with copious antibiotic solution.  The field was dry.  A subgaleal Hemovac drain was placed brought through a single Incision fixed in place with a suture.  Temporalis fascia was closed with Vicryl.  The galea was closed with interrupted Vicryl.  Skin was closed with a running nylon.  A sterile dressing and head wrap were then applied.  Patient tolerated seizure well returned to recovery in stable condition

## 2021-11-03 ENCOUNTER — APPOINTMENT (OUTPATIENT)
Dept: MRI IMAGING | Facility: HOSPITAL | Age: 41
End: 2021-11-03

## 2021-11-03 PROBLEM — I10 ESSENTIAL HYPERTENSION: Status: ACTIVE | Noted: 2021-11-02

## 2021-11-03 PROBLEM — G93.89 MASS OF TEMPOROPARIETAL REGION OF BRAIN: Status: ACTIVE | Noted: 2021-11-02

## 2021-11-03 PROBLEM — E66.01 CLASS 3 SEVERE OBESITY WITH BODY MASS INDEX (BMI) OF 40.0 TO 44.9 IN ADULT: Status: ACTIVE | Noted: 2021-11-02

## 2021-11-03 PROBLEM — B37.0 ORAL THRUSH: Status: ACTIVE | Noted: 2021-11-02

## 2021-11-03 PROBLEM — R73.9 HYPERGLYCEMIA: Status: ACTIVE | Noted: 2021-11-02

## 2021-11-03 PROBLEM — R41.0 CONFUSION: Status: ACTIVE | Noted: 2021-11-02

## 2021-11-03 PROBLEM — E66.813 CLASS 3 SEVERE OBESITY WITH BODY MASS INDEX (BMI) OF 40.0 TO 44.9 IN ADULT: Status: ACTIVE | Noted: 2021-11-02

## 2021-11-03 LAB
ALBUMIN SERPL-MCNC: 3.4 G/DL (ref 3.5–5.2)
ALBUMIN/GLOB SERPL: 1.9 G/DL
ALP SERPL-CCNC: 37 U/L (ref 39–117)
ALT SERPL W P-5'-P-CCNC: 67 U/L (ref 1–41)
ANION GAP SERPL CALCULATED.3IONS-SCNC: 13 MMOL/L (ref 5–15)
ANION GAP SERPL CALCULATED.3IONS-SCNC: 14 MMOL/L (ref 5–15)
AST SERPL-CCNC: 22 U/L (ref 1–40)
BASOPHILS # BLD AUTO: 0.1 10*3/MM3 (ref 0–0.2)
BASOPHILS NFR BLD AUTO: 0.4 % (ref 0–1.5)
BH BB BLOOD EXPIRATION DATE: NORMAL
BH BB BLOOD EXPIRATION DATE: NORMAL
BH BB BLOOD TYPE BARCODE: 6200
BH BB BLOOD TYPE BARCODE: 6200
BH BB DISPENSE STATUS: NORMAL
BH BB DISPENSE STATUS: NORMAL
BH BB PRODUCT CODE: NORMAL
BH BB PRODUCT CODE: NORMAL
BH BB UNIT NUMBER: NORMAL
BH BB UNIT NUMBER: NORMAL
BILIRUB SERPL-MCNC: 0.9 MG/DL (ref 0–1.2)
BUN SERPL-MCNC: 22 MG/DL (ref 6–20)
BUN SERPL-MCNC: 24 MG/DL (ref 6–20)
BUN/CREAT SERPL: 31.9 (ref 7–25)
BUN/CREAT SERPL: 40 (ref 7–25)
CALCIUM SPEC-SCNC: 7.8 MG/DL (ref 8.6–10.5)
CALCIUM SPEC-SCNC: 7.9 MG/DL (ref 8.6–10.5)
CHLORIDE SERPL-SCNC: 92 MMOL/L (ref 98–107)
CHLORIDE SERPL-SCNC: 95 MMOL/L (ref 98–107)
CHOLEST SERPL-MCNC: 159 MG/DL (ref 0–200)
CO2 SERPL-SCNC: 23 MMOL/L (ref 22–29)
CO2 SERPL-SCNC: 23 MMOL/L (ref 22–29)
CREAT SERPL-MCNC: 0.6 MG/DL (ref 0.76–1.27)
CREAT SERPL-MCNC: 0.69 MG/DL (ref 0.76–1.27)
CROSSMATCH INTERPRETATION: NORMAL
CROSSMATCH INTERPRETATION: NORMAL
DEPRECATED RDW RBC AUTO: 47.3 FL (ref 37–54)
EOSINOPHIL # BLD AUTO: 0 10*3/MM3 (ref 0–0.4)
EOSINOPHIL NFR BLD AUTO: 0.1 % (ref 0.3–6.2)
ERYTHROCYTE [DISTWIDTH] IN BLOOD BY AUTOMATED COUNT: 16.1 % (ref 12.3–15.4)
GFR SERPL CREATININE-BSD FRML MDRD: 127 ML/MIN/1.73
GFR SERPL CREATININE-BSD FRML MDRD: 149 ML/MIN/1.73
GLOBULIN UR ELPH-MCNC: 1.8 GM/DL
GLUCOSE BLDC GLUCOMTR-MCNC: 162 MG/DL (ref 70–105)
GLUCOSE BLDC GLUCOMTR-MCNC: 176 MG/DL (ref 70–105)
GLUCOSE BLDC GLUCOMTR-MCNC: 181 MG/DL (ref 70–105)
GLUCOSE BLDC GLUCOMTR-MCNC: 200 MG/DL (ref 70–105)
GLUCOSE SERPL-MCNC: 175 MG/DL (ref 65–99)
GLUCOSE SERPL-MCNC: 192 MG/DL (ref 65–99)
HBA1C MFR BLD: 6.2 % (ref 3.5–5.6)
HCT VFR BLD AUTO: 32.8 % (ref 37.5–51)
HDLC SERPL-MCNC: 33 MG/DL (ref 40–60)
HGB BLD-MCNC: 11.1 G/DL (ref 13–17.7)
LDLC SERPL CALC-MCNC: 87 MG/DL (ref 0–100)
LDLC/HDLC SERPL: 2.42 {RATIO}
LYMPHOCYTES # BLD AUTO: 0.5 10*3/MM3 (ref 0.7–3.1)
LYMPHOCYTES NFR BLD AUTO: 3.1 % (ref 19.6–45.3)
MAGNESIUM SERPL-MCNC: 2.1 MG/DL (ref 1.6–2.6)
MCH RBC QN AUTO: 28.3 PG (ref 26.6–33)
MCHC RBC AUTO-ENTMCNC: 34 G/DL (ref 31.5–35.7)
MCV RBC AUTO: 83.1 FL (ref 79–97)
MONOCYTES # BLD AUTO: 0.9 10*3/MM3 (ref 0.1–0.9)
MONOCYTES NFR BLD AUTO: 5.7 % (ref 5–12)
NEUTROPHILS NFR BLD AUTO: 14.1 10*3/MM3 (ref 1.7–7)
NEUTROPHILS NFR BLD AUTO: 90.7 % (ref 42.7–76)
NRBC BLD AUTO-RTO: 0 /100 WBC (ref 0–0.2)
PHOSPHATE SERPL-MCNC: 3.3 MG/DL (ref 2.5–4.5)
PLATELET # BLD AUTO: 165 10*3/MM3 (ref 140–450)
PMV BLD AUTO: 6.9 FL (ref 6–12)
POTASSIUM SERPL-SCNC: 4.1 MMOL/L (ref 3.5–5.2)
POTASSIUM SERPL-SCNC: 4.4 MMOL/L (ref 3.5–5.2)
PROT SERPL-MCNC: 5.2 G/DL (ref 6–8.5)
RBC # BLD AUTO: 3.94 10*6/MM3 (ref 4.14–5.8)
SODIUM SERPL-SCNC: 129 MMOL/L (ref 136–145)
SODIUM SERPL-SCNC: 131 MMOL/L (ref 136–145)
TRIGL SERPL-MCNC: 231 MG/DL (ref 0–150)
UNIT  ABO: NORMAL
UNIT  ABO: NORMAL
UNIT  RH: NORMAL
UNIT  RH: NORMAL
VLDLC SERPL-MCNC: 39 MG/DL (ref 5–40)
WBC # BLD AUTO: 15.5 10*3/MM3 (ref 3.4–10.8)

## 2021-11-03 PROCEDURE — 83735 ASSAY OF MAGNESIUM: CPT | Performed by: NURSE PRACTITIONER

## 2021-11-03 PROCEDURE — 97165 OT EVAL LOW COMPLEX 30 MIN: CPT

## 2021-11-03 PROCEDURE — 97162 PT EVAL MOD COMPLEX 30 MIN: CPT

## 2021-11-03 PROCEDURE — 82962 GLUCOSE BLOOD TEST: CPT

## 2021-11-03 PROCEDURE — 25010000002 LEVETIRACETAM IN NACL 0.82% 500 MG/100ML SOLUTION: Performed by: NEUROLOGICAL SURGERY

## 2021-11-03 PROCEDURE — A9579 GAD-BASE MR CONTRAST NOS,1ML: HCPCS | Performed by: INTERNAL MEDICINE

## 2021-11-03 PROCEDURE — 80048 BASIC METABOLIC PNL TOTAL CA: CPT | Performed by: NURSE PRACTITIONER

## 2021-11-03 PROCEDURE — 97116 GAIT TRAINING THERAPY: CPT

## 2021-11-03 PROCEDURE — 85025 COMPLETE CBC W/AUTO DIFF WBC: CPT | Performed by: NEUROLOGICAL SURGERY

## 2021-11-03 PROCEDURE — 84100 ASSAY OF PHOSPHORUS: CPT | Performed by: NURSE PRACTITIONER

## 2021-11-03 PROCEDURE — 83036 HEMOGLOBIN GLYCOSYLATED A1C: CPT | Performed by: NURSE PRACTITIONER

## 2021-11-03 PROCEDURE — 25010000002 DEXAMETHASONE PER 1 MG: Performed by: NEUROLOGICAL SURGERY

## 2021-11-03 PROCEDURE — 80053 COMPREHEN METABOLIC PANEL: CPT | Performed by: NURSE PRACTITIONER

## 2021-11-03 PROCEDURE — 63710000001 DEXAMETHASONE PER 0.25 MG: Performed by: NEUROLOGICAL SURGERY

## 2021-11-03 PROCEDURE — 63710000001 INSULIN LISPRO (HUMAN) PER 5 UNITS: Performed by: NURSE PRACTITIONER

## 2021-11-03 PROCEDURE — 25010000002 MORPHINE SULFATE (PF) 2 MG/ML SOLUTION

## 2021-11-03 PROCEDURE — 25010000002 FLUCONAZOLE PER 200 MG: Performed by: NEUROLOGICAL SURGERY

## 2021-11-03 PROCEDURE — 80061 LIPID PANEL: CPT | Performed by: NURSE PRACTITIONER

## 2021-11-03 PROCEDURE — 70553 MRI BRAIN STEM W/O & W/DYE: CPT

## 2021-11-03 PROCEDURE — 25010000002 GADOTERIDOL PER 1 ML: Performed by: INTERNAL MEDICINE

## 2021-11-03 PROCEDURE — 25010000002 MORPHINE PER 10 MG: Performed by: NEUROLOGICAL SURGERY

## 2021-11-03 RX ORDER — ONDANSETRON 4 MG/1
4 TABLET, FILM COATED ORAL EVERY 6 HOURS PRN
Status: DISCONTINUED | OUTPATIENT
Start: 2021-11-03 | End: 2021-11-05 | Stop reason: HOSPADM

## 2021-11-03 RX ORDER — ONDANSETRON 2 MG/ML
4 INJECTION INTRAMUSCULAR; INTRAVENOUS EVERY 6 HOURS PRN
Status: DISCONTINUED | OUTPATIENT
Start: 2021-11-03 | End: 2021-11-05 | Stop reason: HOSPADM

## 2021-11-03 RX ORDER — MORPHINE SULFATE 2 MG/ML
2 INJECTION, SOLUTION INTRAMUSCULAR; INTRAVENOUS ONCE
Status: COMPLETED | OUTPATIENT
Start: 2021-11-03 | End: 2021-11-03

## 2021-11-03 RX ORDER — MAGNESIUM SULFATE 1 G/100ML
1 INJECTION INTRAVENOUS AS NEEDED
Status: DISCONTINUED | OUTPATIENT
Start: 2021-11-03 | End: 2021-11-05 | Stop reason: HOSPADM

## 2021-11-03 RX ORDER — OLANZAPINE 10 MG/2ML
1 INJECTION, POWDER, LYOPHILIZED, FOR SOLUTION INTRAMUSCULAR
Status: DISCONTINUED | OUTPATIENT
Start: 2021-11-03 | End: 2021-11-05 | Stop reason: HOSPADM

## 2021-11-03 RX ORDER — FENTANYL/ROPIVACAINE/NS/PF 2-625MCG/1
15 PLASTIC BAG, INJECTION (ML) EPIDURAL AS NEEDED
Status: DISCONTINUED | OUTPATIENT
Start: 2021-11-03 | End: 2021-11-05 | Stop reason: HOSPADM

## 2021-11-03 RX ORDER — POTASSIUM CHLORIDE 7.45 MG/ML
10 INJECTION INTRAVENOUS
Status: DISCONTINUED | OUTPATIENT
Start: 2021-11-03 | End: 2021-11-05 | Stop reason: HOSPADM

## 2021-11-03 RX ORDER — INSULIN LISPRO 100 [IU]/ML
0-7 INJECTION, SOLUTION INTRAVENOUS; SUBCUTANEOUS AS NEEDED
Status: DISCONTINUED | OUTPATIENT
Start: 2021-11-03 | End: 2021-11-05 | Stop reason: HOSPADM

## 2021-11-03 RX ORDER — MAGNESIUM SULFATE HEPTAHYDRATE 40 MG/ML
2 INJECTION, SOLUTION INTRAVENOUS AS NEEDED
Status: DISCONTINUED | OUTPATIENT
Start: 2021-11-03 | End: 2021-11-05 | Stop reason: HOSPADM

## 2021-11-03 RX ORDER — SODIUM CHLORIDE 0.9 % (FLUSH) 0.9 %
10 SYRINGE (ML) INJECTION EVERY 12 HOURS SCHEDULED
Status: DISCONTINUED | OUTPATIENT
Start: 2021-11-03 | End: 2021-11-05 | Stop reason: HOSPADM

## 2021-11-03 RX ORDER — AMLODIPINE BESYLATE 5 MG/1
5 TABLET ORAL DAILY
Status: DISCONTINUED | OUTPATIENT
Start: 2021-11-03 | End: 2021-11-05 | Stop reason: HOSPADM

## 2021-11-03 RX ORDER — INSULIN LISPRO 100 [IU]/ML
0-7 INJECTION, SOLUTION INTRAVENOUS; SUBCUTANEOUS
Status: DISCONTINUED | OUTPATIENT
Start: 2021-11-03 | End: 2021-11-05 | Stop reason: HOSPADM

## 2021-11-03 RX ORDER — ACETAMINOPHEN 650 MG/1
650 SUPPOSITORY RECTAL EVERY 4 HOURS PRN
Status: DISCONTINUED | OUTPATIENT
Start: 2021-11-03 | End: 2021-11-05 | Stop reason: HOSPADM

## 2021-11-03 RX ORDER — SODIUM CHLORIDE 0.9 % (FLUSH) 0.9 %
10 SYRINGE (ML) INJECTION AS NEEDED
Status: DISCONTINUED | OUTPATIENT
Start: 2021-11-03 | End: 2021-11-05 | Stop reason: HOSPADM

## 2021-11-03 RX ORDER — NICOTINE POLACRILEX 4 MG
15 LOZENGE BUCCAL
Status: DISCONTINUED | OUTPATIENT
Start: 2021-11-03 | End: 2021-11-05 | Stop reason: HOSPADM

## 2021-11-03 RX ORDER — LEVETIRACETAM 500 MG/1
500 TABLET ORAL EVERY 12 HOURS SCHEDULED
Status: DISCONTINUED | OUTPATIENT
Start: 2021-11-03 | End: 2021-11-05 | Stop reason: HOSPADM

## 2021-11-03 RX ORDER — FAMOTIDINE 20 MG/1
20 TABLET, FILM COATED ORAL
Status: DISCONTINUED | OUTPATIENT
Start: 2021-11-03 | End: 2021-11-05 | Stop reason: HOSPADM

## 2021-11-03 RX ORDER — SODIUM CHLORIDE 9 MG/ML
50 INJECTION, SOLUTION INTRAVENOUS CONTINUOUS
Status: DISCONTINUED | OUTPATIENT
Start: 2021-11-03 | End: 2021-11-04

## 2021-11-03 RX ORDER — NITROGLYCERIN 0.4 MG/1
0.4 TABLET SUBLINGUAL
Status: DISCONTINUED | OUTPATIENT
Start: 2021-11-03 | End: 2021-11-05 | Stop reason: HOSPADM

## 2021-11-03 RX ORDER — ALUMINA, MAGNESIA, AND SIMETHICONE 2400; 2400; 240 MG/30ML; MG/30ML; MG/30ML
15 SUSPENSION ORAL EVERY 6 HOURS PRN
Status: DISCONTINUED | OUTPATIENT
Start: 2021-11-03 | End: 2021-11-05 | Stop reason: HOSPADM

## 2021-11-03 RX ORDER — POTASSIUM CHLORIDE 20 MEQ/1
40 TABLET, EXTENDED RELEASE ORAL AS NEEDED
Status: DISCONTINUED | OUTPATIENT
Start: 2021-11-03 | End: 2021-11-05 | Stop reason: HOSPADM

## 2021-11-03 RX ORDER — POTASSIUM CHLORIDE 1.5 G/1.77G
40 POWDER, FOR SOLUTION ORAL AS NEEDED
Status: DISCONTINUED | OUTPATIENT
Start: 2021-11-03 | End: 2021-11-05 | Stop reason: HOSPADM

## 2021-11-03 RX ORDER — DEXTROSE MONOHYDRATE 25 G/50ML
25 INJECTION, SOLUTION INTRAVENOUS
Status: DISCONTINUED | OUTPATIENT
Start: 2021-11-03 | End: 2021-11-05 | Stop reason: HOSPADM

## 2021-11-03 RX ORDER — ACETAMINOPHEN 325 MG/1
650 TABLET ORAL EVERY 4 HOURS PRN
Status: DISCONTINUED | OUTPATIENT
Start: 2021-11-03 | End: 2021-11-05 | Stop reason: HOSPADM

## 2021-11-03 RX ORDER — OXYCODONE HYDROCHLORIDE 5 MG/1
5 TABLET ORAL EVERY 4 HOURS PRN
Status: DISCONTINUED | OUTPATIENT
Start: 2021-11-03 | End: 2021-11-05 | Stop reason: HOSPADM

## 2021-11-03 RX ADMIN — SUCRALFATE 1 G: 1 TABLET ORAL at 07:52

## 2021-11-03 RX ADMIN — AMLODIPINE BESYLATE 5 MG: 5 TABLET ORAL at 11:29

## 2021-11-03 RX ADMIN — OXYCODONE HYDROCHLORIDE 5 MG: 5 TABLET ORAL at 18:07

## 2021-11-03 RX ADMIN — GADOTERIDOL 20 ML: 279.3 INJECTION, SOLUTION INTRAVENOUS at 14:54

## 2021-11-03 RX ADMIN — SUCRALFATE 1 G: 1 TABLET ORAL at 16:57

## 2021-11-03 RX ADMIN — INSULIN LISPRO 2 UNITS: 100 INJECTION, SOLUTION INTRAVENOUS; SUBCUTANEOUS at 16:57

## 2021-11-03 RX ADMIN — INSULIN LISPRO 2 UNITS: 100 INJECTION, SOLUTION INTRAVENOUS; SUBCUTANEOUS at 11:29

## 2021-11-03 RX ADMIN — SODIUM CHLORIDE, PRESERVATIVE FREE 10 ML: 5 INJECTION INTRAVENOUS at 20:02

## 2021-11-03 RX ADMIN — PANTOPRAZOLE SODIUM 40 MG: 40 TABLET, DELAYED RELEASE ORAL at 06:29

## 2021-11-03 RX ADMIN — DEXAMETHASONE 4 MG: 4 TABLET ORAL at 16:57

## 2021-11-03 RX ADMIN — MORPHINE SULFATE 2 MG: 4 INJECTION, SOLUTION INTRAMUSCULAR; INTRAVENOUS at 11:28

## 2021-11-03 RX ADMIN — MORPHINE SULFATE 2 MG: 4 INJECTION, SOLUTION INTRAMUSCULAR; INTRAVENOUS at 07:12

## 2021-11-03 RX ADMIN — DEXAMETHASONE SODIUM PHOSPHATE 4 MG: 4 INJECTION, SOLUTION INTRAMUSCULAR; INTRAVENOUS at 05:33

## 2021-11-03 RX ADMIN — MORPHINE SULFATE 2 MG: 2 INJECTION, SOLUTION INTRAMUSCULAR; INTRAVENOUS at 16:02

## 2021-11-03 RX ADMIN — INSULIN LISPRO 3 UNITS: 100 INJECTION, SOLUTION INTRAVENOUS; SUBCUTANEOUS at 20:05

## 2021-11-03 RX ADMIN — SODIUM CHLORIDE, POTASSIUM CHLORIDE, SODIUM LACTATE AND CALCIUM CHLORIDE 100 ML/HR: 600; 310; 30; 20 INJECTION, SOLUTION INTRAVENOUS at 00:06

## 2021-11-03 RX ADMIN — MORPHINE SULFATE 2 MG: 4 INJECTION, SOLUTION INTRAMUSCULAR; INTRAVENOUS at 00:41

## 2021-11-03 RX ADMIN — NICARDIPINE HYDROCHLORIDE 5 MG/HR: 25 INJECTION, SOLUTION INTRAVENOUS at 02:56

## 2021-11-03 RX ADMIN — FLUCONAZOLE, SODIUM CHLORIDE 400 MG: 2 INJECTION INTRAVENOUS at 16:57

## 2021-11-03 RX ADMIN — DEXAMETHASONE 4 MG: 4 TABLET ORAL at 23:13

## 2021-11-03 RX ADMIN — NICARDIPINE HYDROCHLORIDE 10 MG/HR: 25 INJECTION, SOLUTION INTRAVENOUS at 10:45

## 2021-11-03 RX ADMIN — DEXAMETHASONE 4 MG: 4 TABLET ORAL at 11:29

## 2021-11-03 RX ADMIN — INSULIN LISPRO 2 UNITS: 100 INJECTION, SOLUTION INTRAVENOUS; SUBCUTANEOUS at 07:52

## 2021-11-03 RX ADMIN — LEVETIRACETAM 500 MG: 500 TABLET ORAL at 20:01

## 2021-11-03 RX ADMIN — NICARDIPINE HYDROCHLORIDE 10 MG/HR: 25 INJECTION, SOLUTION INTRAVENOUS at 06:29

## 2021-11-03 RX ADMIN — SUCRALFATE 1 G: 1 TABLET ORAL at 20:01

## 2021-11-03 RX ADMIN — FAMOTIDINE 20 MG: 20 TABLET, FILM COATED ORAL at 16:57

## 2021-11-03 RX ADMIN — SODIUM CHLORIDE, PRESERVATIVE FREE 10 ML: 5 INJECTION INTRAVENOUS at 07:53

## 2021-11-03 RX ADMIN — SUCRALFATE 1 G: 1 TABLET ORAL at 11:29

## 2021-11-03 RX ADMIN — LEVETIRACETAM 500 MG: 5 INJECTION INTRAVASCULAR at 07:52

## 2021-11-03 RX ADMIN — SODIUM CHLORIDE 50 ML/HR: 0.9 INJECTION, SOLUTION INTRAVENOUS at 14:15

## 2021-11-03 RX ADMIN — OXYCODONE HYDROCHLORIDE 5 MG: 5 TABLET ORAL at 14:14

## 2021-11-03 NOTE — DISCHARGE PLACEMENT REQUEST
"Nathan Salas (41 y.o. Male)             Date of Birth Social Security Number Address Home Phone MRN    1980  [address] [place] [address] [phone] [id number]    Congregation Marital Status             None        Admission Date Admission Type Admitting Provider Attending Provider Department, Room/Bed    11/2/21 Elective Francois Jean MD Draw, Azmi, MD [facility] INTENSIVE CARE UNIT, 2308/1    Discharge Date Discharge Disposition Discharge Destination                         Attending Provider: Gala Duval MD    Allergies: No Known Allergies    Isolation: None   Infection: None   Code Status: CPR   Advance Care Planning Activity    Ht: 167.6 cm (65.98\")   Wt: 104 kg (228 lb 13.4 oz)    Admission Cmt: None   Principal Problem: Mass of temporoparietal region of brain [G93.89]                 Active Insurance as of 11/2/2021     Primary Coverage     Payor Plan Insurance Group Employer/Plan Group    [facility] [id number]     Payor Plan Address Payor Plan Phone Number Payor Plan Fax Number Effective Dates    [address]   2/1/2021 - None Entered    [facility] [id number]       Subscriber Name Subscriber Birth Date Member ID       NATHAN SALAS 1980 [id number]                 Emergency Contacts      (Rel.) Home Phone Work Phone Mobile Phone    DARIO GUILLORY (Spouse) [phone] -- --            "

## 2021-11-03 NOTE — PROGRESS NOTES
PULMONARY/CRITICAL CARE PROGRESS NOTE       NAME:     Yoan Salas   AGE:     41 y.o.   SEX:  male   : 1980       MRN:       [id number]          RM: [facility] ICU      ASSESSMENT     F/U: S/p left temporoparietal craniotomy    Principal Problem:    Mass of temporoparietal region of brain  Active Problems:    Class 3 severe obesity with body mass index (BMI) of 40.0 to 44.9 in adult    Mild confusion, with speech issues and memory issues, related to brain mass    Hyperglycemia    GERD (gastroesophageal reflux disease)    Essential hypertension    Oral thrush         MULTIDISCIPLINARY ROUNDING     11/3:  50 ml  out of hemovac  Slow to answer but answers appropriately    On cardene, on 5 of norvasc at home  D/C central line and bone catheter   LR at 100ml/hr, , recheck BMP at noon, watch u/o prior to pulling bone  Diet advanced to full liquid        PLAN      Mass of temporoparietal region of brain  Mild confusion, with speech issues and memory issues, related to brain mass  -Underwent pre-hospital work-up, presented for planned craniotomy with tumor resection of 4.9 cm temporoparietal mass, suspected meningioma  -Analgesia per neurosurgery recommendations.  -Neuro assessment per neurosurgery recommendations.  -Await pathology results     Hyperglycemia  -Likely secondary to steroid use  -Hemoglobin A1c-6.2 on 11/3/2021  -strict glycemic control recommended  -Accuchecks AC/HS or Q6H with sliding scale insulin, based on diet     Essential hypertension  -Closely monitor patient's blood pressure, resume home medication regimen when appropriate  -Home regimen includes: Norvasc  -Norvasc 5 mg restarted 2021     Oral thrush  -Diflucan ordered per Neurosurgery  -Consider switching to Nystatin     GERD (gastroesophageal reflux disease)  -Continue PPI, Carafate     Class 3 severe obesity with body mass index (BMI) of 40.0 to 44.9 in adult  -Complicating aspects of care  - on lifestyle  modifications to improve overall health           [place] & SUBJECTIVE     Yoan Salas is a 41 y.o. male  has a past medical history of Allergic, Anxiety, Asthma, Cancer (HCC), Depression, GERD (gastroesophageal reflux disease), and Hypertension.   Patient presented to [facility] for planned elective craniotomy on 11/2/2021 by Dr. Jean. Preoperative work-up revealed that patient had some word finding difficulty over the past several months that had became more severe. He also had admitted to some difficulty reading and some mild memory problems. Per patient's wife, he reportedly sometimes gets the wrong word and also gives the example that he is confused that there are both male and female pet cats. Patient attributed this to anxiety initially and was started on Prozac. Now that he was found with the diagnosis of meningioma, he states that he does not feel like he is depressed and is working with his family doctor to wean that medication. 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 Patient is with known mild reflux. He denied chest pain, shortness of breath but did admit to some mild occasional headaches. He denied any visual problems, numbness, weakness, incoordination or balance problems. Patient does not take any blood thinners, and is a non-smoker. Preoperative evaluation revealed an MRI of the brain with and without contrast that revealed a 4.9 cm dural based mass in his left parietal area with significant mass-effect and edema. It was recommended for patient to undergo craniotomy with tumor resection.       Patient on 11/2 underwent left temporoparietal craniotomy for resection of large meningioma by Dr. Jean with noted approximate blood loss of 1200 mL, with pathology pending. Patient had one subgaleal Hemovac placed. Patient was successfully extubated in PACU, and Pulmonary/Intensivist consultation was  requested for frequent neurologic monitoring while in ICU. Patient is currently recently postoperative, confused, moving all extremities but having difficulty with following commands at this time. Patient does know his name, but is answering yes and no questions variably, but feel this is most likely medication related versus an actual acute neurologic change, as these are the same behaviors and answers that patient was reporting while in PACU. We will closely monitor, neurosurgeon is aware.       DAILY UPDATES:  11/3: Patient resting in bed. Reports minimal pain at incisional site.  His pain a 4 out of 10 well controlled with medication.  Patient denies any nausea or vomiting, dizziness, shortness of breath, or abdominal pain.  Patient has no other complaints at this time.    REVIEW OF SYSTEMS:  Pertinent items are noted in HPI, all other systems reviewed and negative      MEDICATIONS     SCHEDULED MEDICATIONS:   amLODIPine, 5 mg, Oral, Daily  dexamethasone, 4 mg, Oral, Q6H   Or  dexamethasone, 4 mg, Intravenous, Q6H  famotidine, 20 mg, Oral, BID AC  fluconazole, 400 mg, Intravenous, Q24H  insulin lispro, 0-7 Units, Subcutaneous, 4x Daily With Meals & Nightly  levETIRAcetam, 500 mg, Intravenous, Q12H  pantoprazole, 40 mg, Oral, QAM  sodium chloride, 10 mL, Intravenous, Q12H  sodium chloride, 10 mL, Intravenous, Q12H  sucralfate, 1 g, Oral, 4x Daily AC & at Bedtime        CONTINUOUS INFUSIONS:   lactated ringers, 100 mL/hr, Last Rate: 100 mL/hr (11/03/21 0006)  niCARdipine, 5-15 mg/hr, Last Rate: 10 mg/hr (11/03/21 0629)        PRN MEDS:    acetaminophen **OR** acetaminophen    albuterol sulfate HFA    aluminum-magnesium hydroxide-simethicone    dextrose    dextrose    enalaprilat    glucagon (human recombinant)    insulin lispro **AND** insulin lispro    magnesium sulfate **OR** magnesium sulfate in D5W 1g/100mL (PREMIX)    Morphine **AND** naloxone    nitroglycerin    ondansetron **OR** ondansetron    potassium  "chloride **OR** potassium chloride **OR** potassium chloride    potassium phosphate infusion greater than 15 mMoles **OR** potassium phosphate infusion greater than 15 mMoles **OR** potassium phosphate **OR** sodium phosphate IVPB **OR** sodium phosphate IVPB **OR** sodium phosphate IVPB    promethazine    sodium chloride    sodium chloride    OBJECTIVE     VITAL SIGNS:  /74   Pulse 93   Temp 98.3 °F (36.8 °C) (Oral)   Resp 17   Ht 167.6 cm (65.98\")   Wt 104 kg (228 lb 13.4 oz) Comment: Blankets on side rails for seizure precautions  SpO2 97%   BMI 36.95 kg/m²     I/O FROM PREVIOUS 3 SHIFTS:  I/O last 3 completed shifts:  In: 7161 [P.O.:360; I.V.:5371; Blood:600; IV Piggyback:830]  Out: 3450 [Urine:2350; Drains:100; Blood:1000]    NET BALANCE SINCE ADMISSION:  Net IO Since Admission: 3,711 mL [11/03/21 0940]     I/O PREVIOUS 24 HOURS:   Intake/Output                   11/02/21 0701 - 11/03/21 0700     [id number] 6941-1282 Total              Intake    P.O.  --  360 360    I.V.  3245  2126 5371    Blood  600  -- 600    Volume (Transfuse RBC) 300 -- 300    Volume (Transfuse RBC) 300 -- 300    IV Piggyback  830  -- 830    Total Intake 4675 2486 7161       Output    Urine  1200  1150 2350    Drains  --  100 100    Blood  1000  -- 1000    Total Output 2200 1250 3450               BOWEL MOVEMENTS:  Stool Output  Stool Unmeasured Occurrence: 1 (11/03/21 1405)  Bowel Incontinence: No (11/03/21 1405)  Stool Amount: moderate (11/03/21 1405)       PHYSICAL EXAM:  General Appearance:  Alert, cooperative, no distress, appears stated age  Head:  Normocephalic, without obvious abnormality, surgical incision left parietal region-dressing intact  Eyes:  PERRL, conjunctiva/corneas clear, EOM's intact     Neck:  Supple, trachea midline  Lungs:   Clear and equal bilaterally, respirations unlabored without accessory muscle use, Oertli on 2 L nasal cannula  Chest wall:  No tenderness  Heart: Denies tachycardia, S1 and S2 " normal, no murmur, rub or gallop  Abdomen:  Soft, non-tender, bowel sounds active all four quadrants  Extremities:  Extremities normal, no cyanosis or edema  Pulses: 2+ bilateral radial pulses, 2+ bilateral DP pulses, 2+ bilateral PT pulses  Skin:  No rashes or lesions, surgical incision left parietal region-dressing intact  Neurologic:   Alert and oriented, no focal deficits      RESULTS     LABS:  Lab Results (last 24 hours)       Procedure Component Value Units Date/Time    POC Glucose Once [[id number]]  (Abnormal) Collected: 11/03/21 0712    Specimen: Blood Updated: 11/03/21 0713     Glucose 181 mg/dL      Comment: Serial Number: [id number]:  [id number]       Lipid Panel [[id number]]  (Abnormal) Collected: 11/03/21 0528    Specimen: Blood Updated: 11/03/21 0701     Total Cholesterol 159 mg/dL      Triglycerides 231 mg/dL      HDL Cholesterol 33 mg/dL      LDL Cholesterol  87 mg/dL      VLDL Cholesterol 39 mg/dL      LDL/HDL Ratio 2.42    Narrative:      Cholesterol Reference Ranges  (U.S. Department of Health and Human Services ATP III Classifications)    Desirable          <200 mg/dL  Borderline High    200-239 mg/dL  High Risk          >240 mg/dL      Triglyceride Reference Ranges  (U.S. Department of Health and Human Services ATP III Classifications)    Normal           <150 mg/dL  Borderline High  150-199 mg/dL  High             200-499 mg/dL  Very High        >500 mg/dL    HDL Reference Ranges  (U.S. Department of Health and Human Services ATP III Classifcations)    Low     <40 mg/dl (major risk factor for CHD)  High    >60 mg/dl ('negative' risk factor for CHD)        LDL Reference Ranges  (U.S. Department of Health and Human Services ATP III Classifcations)    Optimal          <100 mg/dL  Near Optimal     100-129 mg/dL  Borderline High  130-159 mg/dL  High             160-189 mg/dL  Very High        >189 mg/dL    Tissue Pathology Exam [[id number]] Collected: 11/02/21 1010    Specimen: Tissue from Head,  Tissue from Head Updated: 11/03/21 0611     Case Report --     Surgical Pathology Report                         Case: [id number]                                  Authorizing Provider:  Francois Jean MD  Collected:           11/02/2021 10:10 AM          Ordering Location:     [facility] MAIN  Received:            11/02/2021 10:17 AM                                 OR                                                                           Pathologist:           Ernesto Lal MD                                                             Intraop:               Ernesto Lal MD                                                             Specimen:    Head, parietal tumor                                                                        Intraoperative Consultation --     1FA. Parietal tumor, left, biopsy:     DX: Fibroblastic meningioma            No necrosis or mitotic figures identified    Ernesto Lal MD      Comprehensive Metabolic Panel [[id number]]  (Abnormal) Collected: 11/03/21 0528    Specimen: Blood Updated: 11/03/21 0603     Glucose 192 mg/dL      BUN 24 mg/dL      Creatinine 0.60 mg/dL      Sodium 131 mmol/L      Potassium 4.4 mmol/L      Chloride 95 mmol/L      CO2 23.0 mmol/L      Calcium 7.8 mg/dL      Total Protein 5.2 g/dL      Albumin 3.40 g/dL      ALT (SGPT) 67 U/L      AST (SGOT) 22 U/L      Alkaline Phosphatase 37 U/L      Total Bilirubin 0.9 mg/dL      eGFR Non African Amer 149 mL/min/1.73      Globulin 1.8 gm/dL      A/G Ratio 1.9 g/dL      BUN/Creatinine Ratio 40.0     Anion Gap 13.0 mmol/L     Narrative:      GFR Normal >60  Chronic Kidney Disease <60  Kidney Failure <15      Phosphorus [[id number]]  (Normal) Collected: 11/03/21 0528    Specimen: Blood Updated: 11/03/21 0603     Phosphorus 3.3 mg/dL     Magnesium [[id number]]  (Normal) Collected: 11/03/21 0528    Specimen: Blood Updated: 11/03/21 0603     Magnesium 2.1 mg/dL     CBC & Differential [[id number]]  (Abnormal)  Collected: 11/03/21 0528    Specimen: Blood Updated: 11/03/21 0535    Narrative:      The following orders were created for panel order CBC & Differential.  Procedure                               Abnormality         Status                     ---------                               -----------         ------                     CBC Auto Differential[[id number]]        Abnormal            Final result                 Please view results for these tests on the individual orders.    CBC Auto Differential [[id number]]  (Abnormal) Collected: 11/03/21 0528    Specimen: Blood Updated: 11/03/21 0535     WBC 15.50 10*3/mm3      RBC 3.94 10*6/mm3      Hemoglobin 11.1 g/dL      Hematocrit 32.8 %      MCV 83.1 fL      MCH 28.3 pg      MCHC 34.0 g/dL      RDW 16.1 %      RDW-SD 47.3 fl      MPV 6.9 fL      Platelets 165 10*3/mm3      Neutrophil % 90.7 %      Lymphocyte % 3.1 %      Monocyte % 5.7 %      Eosinophil % 0.1 %      Basophil % 0.4 %      Neutrophils, Absolute 14.10 10*3/mm3      Lymphocytes, Absolute 0.50 10*3/mm3      Monocytes, Absolute 0.90 10*3/mm3      Eosinophils, Absolute 0.00 10*3/mm3      Basophils, Absolute 0.10 10*3/mm3      nRBC 0.0 /100 WBC     Hemoglobin A1c [[id number]] Collected: 11/03/21 0528    Specimen: Blood Updated: 11/03/21 0532    POC Glucose Once [[id number]]  (Abnormal) Collected: 11/02/21 2358    Specimen: Blood Updated: 11/02/21 2359     Glucose 184 mg/dL      Comment: Serial Number: [id number]:  [id number]       POC Glucose Once [[id number]]  (Abnormal) Collected: 11/02/21 1711    Specimen: Blood Updated: 11/02/21 1712     Glucose 191 mg/dL      Comment: Serial Number: [id number]:  [id number]       POC Chem 8, arterial (ISTAT) [[id number]]  (Abnormal) Collected: 11/02/21 1336    Specimen: Arterial Blood Updated: 11/02/21 1458     Ionized Calcium 1.03 mmol/L      pH, Arterial 7.350 pH units      pCO2, Arterial 45.2 mm Hg      pO2, Arterial 261.0 mm Hg      HCO3, Arterial 24.8 mmol/L       Base Excess, Arterial <0.0 mmol/L      Base Deficit --     O2 Saturation, Arterial 100.0 %      Glucose 169 mg/dL      Comment: Serial Number: [id number]:  [id number]        Sodium 132 mmol/L      POC Potassium 4.8 mmol/L      Hematocrit 26 %      Hemoglobin 8.8 g/dL      CO2 Content 26 mmol/L     POC Chem 8, arterial (ISTAT) [[id number]]  (Abnormal) Collected: 11/02/21 1208    Specimen: Arterial Blood Updated: 11/02/21 1457     Ionized Calcium 1.06 mmol/L      pH, Arterial 7.280 pH units      pCO2, Arterial 51.8 mm Hg      pO2, Arterial 238.0 mm Hg      HCO3, Arterial 24.5 mmol/L      Base Excess, Arterial <0.0 mmol/L      Base Deficit --     O2 Saturation, Arterial 100.0 %      Glucose 185 mg/dL      Comment: Serial Number: [id number]:  [id number]        Sodium 130 mmol/L      POC Potassium 5.2 mmol/L      Hematocrit 35 %      Hemoglobin 11.9 g/dL      CO2 Content 26 mmol/L     POC Chem 8, arterial (ISTAT) [[id number]]  (Abnormal) Collected: 11/02/21 1025    Specimen: Arterial Blood Updated: 11/02/21 1457     Ionized Calcium 1.08 mmol/L      pH, Arterial 7.360 pH units      pCO2, Arterial 43.3 mm Hg      pO2, Arterial 198.0 mm Hg      HCO3, Arterial 24.4 mmol/L      Base Excess, Arterial <0.0 mmol/L      Base Deficit --     O2 Saturation, Arterial 100.0 %      Glucose 192 mg/dL      Comment: Serial Number: [id number]:  [id number]        Sodium 127 mmol/L      POC Potassium 4.7 mmol/L      Hematocrit 32 %      Hemoglobin 10.9 g/dL      CO2 Content 26 mmol/L              RADIOLOGY:  XR Chest 1 View    Result Date: 11/2/2021  DATE OF EXAM: 11/2/2021 3:05 PM  PROCEDURE: XR CHEST 1 VW-  INDICATIONS: Post-Op; D49.6-Neoplasm of unspecified behavior of brain   COMPARISON: PA and lateral chest 10/26/2021.  TECHNIQUE: Single radiographic view of the chest was obtained.  FINDINGS: Low volume inspiration. Heart size appears moderately enlarged, which may be accentuated by the semierect positioning and the portable  technique. No definite acute airspace disease is identified. Right internal jugular central line extends to the lower SVC level. No visible pneumothorax.       1. Cardiac mediastinal silhouette appears moderately enlarged. This could be accentuated by the semierect positioning and portable technique. Consider repeat imaging with the patient is able. 2. Right IJ central line tip terminates at the mid SVC level. No visible pneumothorax.  Electronically Signed By-Sonia Souza MD On:11/2/2021 3:09 PM This report was finalized on [id number] by  Sonia Souza MD.      ECHOCARDIOGRAM:        I reviewed the patient's new clinical results.    This note has been scribed by me for pulmonary attending physician.    Electronically signed by HI Warren, 11/03/21 at 09:40 EDT.

## 2021-11-03 NOTE — PROGRESS NOTES
No complaints.  Afebrile stable vital signs.  Blood pressure presently controlled.  Dressing clean and dry.  Awake alert and oriented.  Answers questions and follows commands without difficulty.  Facial muscles symmetric.  No motor dysfunction.  Speech is fluent.  MRI pending.  Sodium 131.  Doing well postop.  Mobilize.  Check MRI.  Possible transfer to floor tomorrow.

## 2021-11-03 NOTE — CASE MANAGEMENT/SOCIAL WORK
Discharge Planning Assessment  [facility]     Patient Name: Yoan Salas  MRN: [id number]  Today's Date: 11/3/2021    Admit Date: 11/2/2021     Discharge Needs Assessment     Row Name 11/03/21 0937       Living Environment    Lives With spouse    Current Living Arrangements home/apartment/condo    Quality of Family Relationships supportive    Able to Return to Prior Arrangements yes       Resource/Environmental Concerns    Resource/Environmental Concerns none    Transportation Concerns car, none       Transition Planning    Patient/Family Anticipates Transition to home with help/services; inpatient rehabilitation facility    Transportation Anticipated family or friend will provide       Discharge Needs Assessment    Readmission Within the Last 30 Days no previous admission in last 30 days    Equipment Currently Used at Home none               Discharge Plan     Row Name 11/03/21 0937       Plan    Plan D/C Plan  : PT/OT to eval  , POD 1 of a crani with resection f a tumor , pt is confused and on 4l of o2 and a Cardene gtt    Plan Comments confirmed PCP and pharmacy , Pt's spouse for transport at D/C , possible need for H/H vs rehab              Continued Care and Services - Admitted Since 11/2/2021    Coordination has not been started for this encounter.          Demographic Summary     Row Name 11/03/21 0936       General Information    Admission Type inpatient    Arrived From operating room    Preferred Language English     Used During This Interaction no               Functional Status     Row Name 11/03/21 0936       Functional Status    Usual Activity Tolerance good    Current Activity Tolerance moderate       Mental Status    General Appearance WDL WDL       Mental Status Summary    Recent Changes in Mental Status/Cognitive Functioning mental status    Mental Status Comments confused post op                         Mei Vaughan RN

## 2021-11-03 NOTE — THERAPY EVALUATION
Patient Name: Yoan Salas  : 1980    MRN: [id number]                              Today's Date: 11/3/2021       Admit Date: 2021    Visit Dx:     ICD-10-CM ICD-9-CM   1. Brain tumor (HCC)  D49.6 239.6     Patient Active Problem List   Diagnosis   • Leukemia in remission, childhood; S/P Radiation   • Class 3 severe obesity with body mass index (BMI) of 40.0 to 44.9 in adult   • Mass of temporoparietal region of brain   • Mild confusion, with speech issues and memory issues, related to brain mass   • Hyperglycemia   • GERD (gastroesophageal reflux disease)   • Essential hypertension   • Oral thrush     Past Medical History:   Diagnosis Date   • Allergic    • Anxiety    • Asthma    • Cancer (HCC)     ALL as child    • Class 3 severe obesity with body mass index (BMI) of 40.0 to 44.9 in adult 2021   • Depression    • GERD (gastroesophageal reflux disease)    • Hypertension    • Leukemia in remission, childhood; S/P Radiation      Past Surgical History:   Procedure Laterality Date   • CRANIOTOMY FOR TUMOR Left 2021    Procedure: CRANIOTOMY FOR RESECTION OF LEFT PARIETAL TUMOR;  Surgeon: Francois Jean MD;  Location: [facility];  Service: Neurosurgery;  Laterality: Left;   • HERNIA REPAIR      6 MONTHS OLD   • PORTACATH PLACEMENT      CHILDHOOD      General Information     Row Name 21          OT Time and Intention    Document Type evaluation  -MP     Mode of Treatment occupational therapy  -MP     Row Name 21 165          General Information    Patient Profile Reviewed yes  -MP     Prior Level of Function independent:; ADL's  -MP     Existing Precautions/Restrictions fall  -MP     Row Name 21          Living Environment    Lives With spouse  -MP     Row Name 21          Cognition    Orientation Status (Cognition) oriented x 3  -MP     Row Name 21          Safety Issues, Functional Mobility    Impairments Affecting Function  (Mobility) balance; postural/trunk control; endurance/activity tolerance; strength  -MP           User Key  (r) = Recorded By, (t) = Taken By, (c) = Cosigned By    Initials Name Provider Type    Ji Chaves OT Occupational Therapist                 Mobility/ADL's     Row Name 11/03/21 1658          Transfers    Transfers sit-stand transfer  -     Sit-Stand [place] (Transfers) 1 person assist; contact guard  -     Row Name 11/03/21 1658          Activities of Daily Living    BADL Assessment/Intervention lower body dressing  -MP     Row Name 11/03/21 1658          Lower Body Dressing Assessment/Training    [place] Level (Lower Body Dressing) doff; don; socks; minimum assist (75% patient effort)  -MP           User Key  (r) = Recorded By, (t) = Taken By, (c) = Cosigned By    Initials Name Provider Type    Ji Chaves OT Occupational Therapist               Obj/Interventions     Row Name 11/03/21 1659          Range of Motion Comprehensive    Comment, General Range of Motion BUE WFL  -MP     Row Name 11/03/21 1659          Strength Comprehensive (MMT)    Comment, General Manual Muscle Testing (MMT) Assessment E WFL  -MP     Row Name 11/03/21 1659          Balance    Static Standing Balance WFL; supported; standing  -MP     Dynamic Standing Balance mild impairment; supported; standing  -MP     Balance Interventions sitting; standing; sit to stand; supported; static; dynamic  -MP           User Key  (r) = Recorded By, (t) = Taken By, (c) = Cosigned By    Initials Name Provider Type    Ji Chaves OT Occupational Therapist               Goals/Plan     Row Name 11/03/21 1701          Bed Mobility Goal 1 (OT)    Activity/Assistive Device (Bed Mobility Goal 1, OT) bed mobility activities, all  -MP     [place] Level/Cues Needed (Bed Mobility Goal 1, OT) independent  -MP     Time Frame (Bed Mobility Goal 1, OT) long term goal (LTG); 2 weeks  -MP     Row Name 11/03/21 1701           Transfer Goal 1 (OT)    Activity/Assistive Device (Transfer Goal 1, OT) transfers, all  -MP     [place] Level/Cues Needed (Transfer Goal 1, OT) independent  -MP     Time Frame (Transfer Goal 1, OT) long term goal (LTG); 2 weeks  -MP     Row Name 11/03/21 1701          Dressing Goal 1 (OT)    Activity/Device (Dressing Goal 1, OT) dressing skills, all  -MP     [place]/Cues Needed (Dressing Goal 1, OT) independent  -MP     Time Frame (Dressing Goal 1, OT) long term goal (LTG); 2 weeks  -MP           User Key  (r) = Recorded By, (t) = Taken By, (c) = Cosigned By    Initials Name Provider Type    MP Ji Hines, ANA Occupational Therapist               Clinical Impression     Row Name 11/03/21 1659          Pain Scale: Numbers Pre/Post-Treatment    Pretreatment Pain Rating 0/10 - no pain  -MP     Posttreatment Pain Rating 0/10 - no pain  -MP     Row Name 11/03/21 1659          Plan of Care Review    Plan of Care Reviewed With patient  -MP     Progress no change  -MP     Outcome Summary Pt. is 42 y/o male admit w/ L temporoparietal craniotomy for resection of large meningioma POD # 1. Pt. c/o of some aphasia thisdate, BUE functional WFL this date, pt. provided min A for LB dressing secondary to impacts to sitting/standing balance. Pt. requires CGA for sit to stand transfers this date. Anticipate d/c home w/ spouse support, will follow up w/ pt. 1-3x per week at [facility] to assess and monitor pt. progress.  -MP     Row Name 11/03/21 1659          Therapy Assessment/Plan (OT)    Rehab Potential (OT) good, to achieve stated therapy goals  -MP     Criteria for Skilled Therapeutic Interventions Met (OT) yes; skilled treatment is necessary  -MP     Therapy Frequency (OT) 3 times/wk  -MP     Row Name 11/03/21 1659          Therapy Plan Review/Discharge Plan (OT)    Anticipated Discharge Disposition (OT) home with assist  -MP     Row Name 11/03/21 1659          Vital Signs    Pre Patient Position Sitting  -MP      Intra Patient Position Standing  -MP     Post Patient Position Sitting  -MP     Row Name 11/03/21 1659          Positioning and Restraints    Pre-Treatment Position sitting in chair/recliner  -MP     Post Treatment Position chair  -MP     In Chair sitting; call light within reach; encouraged to call for assist; exit alarm on  -MP           User Key  (r) = Recorded By, (t) = Taken By, (c) = Cosigned By    Initials Name Provider Type    MP Ji Hines OT Occupational Therapist               Outcome Measures     Row Name 11/03/21 1143          How much help from another person do you currently need...    Turning from your back to your side while in flat bed without using bedrails? 2  -HC     Moving from lying on back to sitting on the side of a flat bed without bedrails? 2  -HC     Moving to and from a bed to a chair (including a wheelchair)? 3  -HC     Standing up from a chair using your arms (e.g., wheelchair, bedside chair)? 3  -HC     Climbing 3-5 steps with a railing? 2  -HC     To walk in hospital room? 3  -HC     AM-PAC 6 Clicks Score (PT) 15  -HC     Row Name 11/03/21 1143          Functional Assessment    Outcome Measure Options AM-PAC 6 Clicks Basic Mobility (PT)  -HC           User Key  (r) = Recorded By, (t) = Taken By, (c) = Cosigned By    Initials Name Provider Type    Cristina Winter, PT Physical Therapist                Occupational Therapy Education                 Title: PT OT SLP Therapies (In Progress)     Topic: Occupational Therapy (In Progress)     Point: ADL training (Not Started)     Description:   Instruct learner(s) on proper safety adaptation and remediation techniques during self care or transfers.   Instruct in proper use of assistive devices.              Learner Progress:  Not documented in this visit.          Point: Home exercise program (Not Started)     Description:   Instruct learner(s) on appropriate technique for monitoring, assisting and/or progressing therapeutic  exercises/activities.              Learner Progress:  Not documented in this visit.          Point: Precautions (Not Started)     Description:   Instruct learner(s) on prescribed precautions during self-care and functional transfers.              Learner Progress:  Not documented in this visit.          Point: Body mechanics (Done)     Description:   Instruct learner(s) on proper positioning and spine alignment during self-care, functional mobility activities and/or exercises.              Learning Progress Summary           Patient Acceptance, E,TB, VU by  at 11/3/2021 1702                               User Key     Initials Effective Dates Name Provider Type Discipline     06/16/21 -  Ji Hines OT Occupational Therapist OT              OT Recommendation and Plan  Therapy Frequency (OT): 3 times/wk  Plan of Care Review  Plan of Care Reviewed With: patient  Progress: no change  Outcome Summary: Pt. is 42 y/o male admit w/ L temporoparietal craniotomy for resection of large meningioma POD # 1. Pt. c/o of some aphasia thisdate, BUE functional WFL this date, pt. provided min A for LB dressing secondary to impacts to sitting/standing balance. Pt. requires CGA for sit to stand transfers this date. Anticipate d/c home w/ spouse support, will follow up w/ pt. 1-3x per week at [facility] to assess and monitor pt. progress.     Time Calculation:    Time Calculation- OT     Row Name 11/03/21 1702             Time Calculation- OT    OT Start Time 1215  -      OT Stop Time 1230  -      OT Time Calculation (min) 15 min  -      Total Timed Code Minutes- OT 0 minute(s)  -      OT Received On 11/03/21  -      OT - Next Appointment 11/05/21  -      OT Goal Re-Cert Due Date 11/17/21  -            User Key  (r) = Recorded By, (t) = Taken By, (c) = Cosigned By    Initials Name Provider Type    Ji Chaves OT Occupational Therapist              Therapy Charges for Today     Code Description Service Date  Service Provider Modifiers Qty    06945572787 HC OT EVAL LOW COMPLEXITY 3 11/3/2021 Ji Hines, ANA GO 1               Ji Hines OT  11/3/2021

## 2021-11-03 NOTE — PLAN OF CARE
Goal Outcome Evaluation:              Outcome Summary: Patient is on 4L NC and is confused at times. Cardene gtt started by  due to hypertension. Orders to keep systolic BP below 150.

## 2021-11-03 NOTE — PLAN OF CARE
Goal Outcome Evaluation:  Plan of Care Reviewed With: patient        Progress: no change  Outcome Summary: Pt. is 42 y/o male admit w/ L temporoparietal craniotomy for resection of large meningioma POD # 1. Pt. c/o of some aphasia thisdate, BUE functional WFL this date, pt. provided min A for LB dressing secondary to impacts to sitting/standing balance. Pt. requires CGA for sit to stand transfers this date. Anticipate d/c home w/ spouse support, will follow up w/ pt. 1-3x per week at [facility] to assess and monitor pt. progress.

## 2021-11-03 NOTE — PLAN OF CARE
Goal Outcome Evaluation:      Pt no longer requiring cardene. VS stable on RA. Pt neuro status improving. Plans to downgrade tomorrow. Will continue to monitor.

## 2021-11-03 NOTE — THERAPY EVALUATION
Patient Name: Yoan Salas  : 1980    MRN: [id number]                              Today's Date: 11/3/2021       Admit Date: 2021    Visit Dx:     ICD-10-CM ICD-9-CM   1. Brain tumor (HCC)  D49.6 239.6     Patient Active Problem List   Diagnosis   • Leukemia in remission, childhood; S/P Radiation   • Class 3 severe obesity with body mass index (BMI) of 40.0 to 44.9 in adult   • Mass of temporoparietal region of brain   • Mild confusion, with speech issues and memory issues, related to brain mass   • Hyperglycemia   • GERD (gastroesophageal reflux disease)   • Essential hypertension   • Oral thrush     Past Medical History:   Diagnosis Date   • Allergic    • Anxiety    • Asthma    • Cancer (HCC)     ALL as child    • Class 3 severe obesity with body mass index (BMI) of 40.0 to 44.9 in adult 2021   • Depression    • GERD (gastroesophageal reflux disease)    • Hypertension    • Leukemia in remission, childhood; S/P Radiation      Past Surgical History:   Procedure Laterality Date   • HERNIA REPAIR      6 MONTHS OLD   • PORTACATH PLACEMENT      CHILDHOOD      General Information     Row Name 21 1138          Physical Therapy Time and Intention    Document Type evaluation  -     Mode of Treatment physical therapy  -     Row Name 21 1138          General Information    Patient Profile Reviewed yes  -HC     Prior Level of Function independent:  working full time  -     Existing Precautions/Restrictions fall  -     Row Name 21 1138          Living Environment    Lives With spouse  -     Row Name 21 1138          Home Main Entrance    Number of Stairs, Main Entrance one  -     Row Name 21 1138          Cognition    Orientation Status (Cognition) --  Oriented to person, place, situation; not oriented to time; pt reporting word finding difficulty and confusion noted  -     Row Name 21 1138          Safety Issues, Functional Mobility    Safety Issues  Affecting Function (Mobility) safety precaution awareness; insight into deficits/self-awareness  -     Impairments Affecting Function (Mobility) balance; postural/trunk control; endurance/activity tolerance; strength  -HC           User Key  (r) = Recorded By, (t) = Taken By, (c) = Cosigned By    Initials Name Provider Type     Cristina Ring, PT Physical Therapist               Mobility     Row Name 11/03/21 1139          Bed Mobility    Bed Mobility supine-sit  -HC     Supine-Sit [place] (Bed Mobility) moderate assist (50% patient effort)  -     Row Name 11/03/21 1139          Bed-Chair Transfer    Bed-Chair [place] (Transfers) 2 person assist; minimum assist (75% patient effort)  -     Row Name 11/03/21 1139          Sit-Stand Transfer    Sit-Stand [place] (Transfers) 2 person assist; minimum assist (75% patient effort)  -     Row Name 11/03/21 1139          Gait/Stairs (Locomotion)    [place] Level (Gait) 2 person assist; minimum assist (75% patient effort)  -     Assistive Device (Gait) --  HHAx2  -HC     Distance in Feet (Gait) 70 ft  -HC     Deviations/Abnormal Patterns (Gait) gait speed decreased  -HC     Comment (Gait/Stairs) lateral sway, decreased gait speed  -HC           User Key  (r) = Recorded By, (t) = Taken By, (c) = Cosigned By    Initials Name Provider Type     Cristina Ring, PT Physical Therapist               Obj/Interventions     Row Name 11/03/21 1140          Range of Motion Comprehensive    Comment, General Range of Motion BLEs WFL, BLEs WFL  -HC     Row Name 11/03/21 1140          Strength Comprehensive (MMT)    Comment, General Manual Muscle Testing (MMT) Assessment BLEs grossly WFL, BLEs grossly 4/5  -HC     Row Name 11/03/21 1140          Balance    Balance Assessment sitting static balance; sitting dynamic balance; standing static balance; standing dynamic balance  -HC     Static Sitting Balance WFL  -HC     Dynamic Sitting Balance WFL  -HC      Static Standing Balance mild impairment  -HC     Dynamic Standing Balance mild impairment  -HC           User Key  (r) = Recorded By, (t) = Taken By, (c) = Cosigned By    Initials Name Provider Type     Cristina Ring, PT Physical Therapist               Goals/Plan     Row Name 11/03/21 1143          Bed Mobility Goal 1 (PT)    Activity/Assistive Device (Bed Mobility Goal 1, PT) bed mobility activities, all  -HC     [place] Level/Cues Needed (Bed Mobility Goal 1, PT) modified independence  -HC     Time Frame (Bed Mobility Goal 1, PT) 2 weeks  -     Row Name 11/03/21 1143          Transfer Goal 1 (PT)    Activity/Assistive Device (Transfer Goal 1, PT) transfers, all  -HC     [place] Level/Cues Needed (Transfer Goal 1, PT) modified independence  -HC     Time Frame (Transfer Goal 1, PT) 2 weeks  -     Row Name 11/03/21 1143          Gait Training Goal 1 (PT)    Activity/Assistive Device (Gait Training Goal 1, PT) gait (walking locomotion); assistive device use  -     [place] Level (Gait Training Goal 1, PT) standby assist  -HC     Distance (Gait Training Goal 1, PT) 125 ft  -HC     Time Frame (Gait Training Goal 1, PT) 2 weeks  -HC           User Key  (r) = Recorded By, (t) = Taken By, (c) = Cosigned By    Initials Name Provider Type     Cristina Ring, PT Physical Therapist               Clinical Impression     Row Name 11/03/21 1141          Pain    Additional Documentation Pain Scale: Numbers Pre/Post-Treatment (Group)  -     Row Name 11/03/21 1141          Pain Scale: Numbers Pre/Post-Treatment    Pretreatment Pain Rating 3/10  -     Posttreatment Pain Rating 0/10 - no pain  -     Row Name 11/03/21 1141          Plan of Care Review    Outcome Summary Pt is 40 yo male s/p left temporoparietal craniotomy for resection of large meningioma on 11/2/2021.  -     Row Name 11/03/21 1141          Therapy Assessment/Plan (PT)    Patient/Family Therapy Goals Statement (PT) pt wanting to  return home with wife and family  -     Rehab Potential (PT) good, to achieve stated therapy goals  -     Criteria for Skilled Interventions Met (PT) yes; skilled treatment is necessary  -HC     Predicted Duration of Therapy Intervention (PT) until d/c  -     Row Name 11/03/21 1141          Vital Signs    Pre Systolic BP Rehab 127  -HC     Pre Treatment Diastolic BP 79  -HC     Intra Systolic BP Rehab 122  -HC     Intra Treatment Diastolic BP 80  -HC     Post Systolic BP Rehab 112  -HC     Post Treatment Diastolic BP 84  -HC     Pretreatment Heart Rate (beats/min) 120  -HC     Intratreatment Heart Rate (beats/min) 126  -HC     Posttreatment Heart Rate (beats/min) 122  -HC     Pre SpO2 (%) 97  -HC     O2 Delivery Pre Treatment supplemental O2  2L  -HC     Post SpO2 (%) 95  -HC     O2 Delivery Post Treatment room air  -     Row Name 11/03/21 1141          Positioning and Restraints    Pre-Treatment Position in bed  -HC     Post Treatment Position chair  -HC     In Chair notified nsg; call light within reach; encouraged to call for assist; exit alarm on  -HC           User Key  (r) = Recorded By, (t) = Taken By, (c) = Cosigned By    Initials Name Provider Type    HC Cristina Ring, PT Physical Therapist               Outcome Measures     Row Name 11/03/21 1143          How much help from another person do you currently need...    Turning from your back to your side while in flat bed without using bedrails? 2  -HC     Moving from lying on back to sitting on the side of a flat bed without bedrails? 2  -HC     Moving to and from a bed to a chair (including a wheelchair)? 3  -HC     Standing up from a chair using your arms (e.g., wheelchair, bedside chair)? 3  -HC     Climbing 3-5 steps with a railing? 2  -HC     To walk in hospital room? 3  -HC     AM-PAC 6 Clicks Score (PT) 15  -HC     Row Name 11/03/21 1143          Functional Assessment    Outcome Measure Options AM-PAC 6 Clicks Basic Mobility (PT)  -            User Key  (r) = Recorded By, (t) = Taken By, (c) = Cosigned By    Initials Name Provider Type    HC Cristina Ring PT Physical Therapist                             Physical Therapy Education                 Title: PT OT SLP Therapies (In Progress)     Topic: Physical Therapy (In Progress)     Point: Mobility training (In Progress)     Learning Progress Summary           Patient Acceptance, E, NR by  at 11/3/2021 1144   Significant Other Acceptance, E, NR by HC at 11/3/2021 1144                   Point: Precautions (In Progress)     Learning Progress Summary           Patient Acceptance, E, NR by HC at 11/3/2021 1144   Significant Other Acceptance, E, NR by HC at 11/3/2021 1144                               User Key     Initials Effective Dates Name Provider Type Discipline     06/16/21 -  Cristina Ring PT Physical Therapist PT              PT Recommendation and Plan  Planned Therapy Interventions (PT): balance training, bed mobility training, gait training, neuromuscular re-education, strengthening, patient/family education, postural re-education, transfer training, ROM (range of motion), stair training  Plan of Care Reviewed With: patient  Outcome Summary: Pt is 42 yo male s/p left temporoparietal craniotomy for resection of large meningioma on 11/2/2021.  Pt presents with word finding difficulties and exhibits confusion with month, year, time.  Pt requiring modA for bed mobility, minAx2 for transfers and able to ambulate using HHA-minAx2 x70 ft.  Pt exhibiting impaired balance, lateral sway, decreased gait speed.  It is anticipated pt will be able to d/c home with assist of wife/family.  PT will continue to follow to determine d/c needs for AD and HHPT vs OPPT.  PT will follow 5x/week while at [facility].     Time Calculation:    PT Charges     Row Name 11/03/21 1148             Time Calculation    Start Time 1015  -      Stop Time 1043  -      Time Calculation (min) 28 min  -      PT Received  On 11/03/21  -HC      PT - Next Appointment 11/04/21  -      PT Goal Re-Cert Due Date 11/17/21  -HC              Time Calculation- PT    Total Timed Code Minutes- PT 15 minute(s)  -HC            User Key  (r) = Recorded By, (t) = Taken By, (c) = Cosigned By    Initials Name Provider Type     Cristina Ring, PT Physical Therapist              Therapy Charges for Today     Code Description Service Date Service Provider Modifiers Qty    88659012187  PT EVAL MOD COMPLEXITY 3 11/3/2021 Cristina Ring, PT GP 1    [id number]  GAIT TRAINING EA 15 MIN 11/3/2021 Cristina Ring, PT GP 1          PT G-Codes  Outcome Measure Options: AM-PAC 6 Clicks Basic Mobility (PT)  AM-PAC 6 Clicks Score (PT): 15    Cristina Ring, PT  11/3/2021

## 2021-11-03 NOTE — PLAN OF CARE
Problem: Adult Inpatient Plan of Care  Goal: Plan of Care Review  Outcome: Ongoing, Progressing  Flowsheets  Taken 11/3/2021 1144  Plan of Care Reviewed With: patient  Outcome Summary: Pt is 42 yo male s/p left temporoparietal craniotomy for resection of large meningioma on 11/2/2021.  Pt presents with word finding difficulties and exhibits confusion with month, year, time.  Pt requiring modA for bed mobility, minAx2 for transfers and able to ambulate using HHA-minAx2 x70 ft.  Pt exhibiting impaired balance, lateral sway, decreased gait speed.  It is anticipated pt will be able to d/c home with assist of wife/family.  PT will continue to follow to determine d/c needs for AD and HHPT vs OPPT.  PT will follow 5x/week while at [facility].

## 2021-11-03 NOTE — CASE MANAGEMENT/SOCIAL WORK
Continued Stay Note  [facility]     Patient Name: Yoan Salas  MRN: [id number]  Today's Date: 11/3/2021    Admit Date: 11/2/2021     Discharge Plan     Row Name 11/03/21 1440       Plan    Plan D/C Plan : New referral to [facility] H/H pending acceptance    Provided Post Acute Provider List? Yes    Post Acute Provider List Home Health    Delivered To Patient; Support Person    Support Person spouse    Method of Delivery In person    Patient/Family in Agreement with Plan yes    Plan Comments Pt's wife able to be with pt 24/7               Discharge Codes    No documentation.               Met with patient in room wearing PPE: mask, face shield/goggles,       Maintained distance greater than six feet and spent less than 15 minutes in the room.          Mei Vaughan RN

## 2021-11-04 LAB
ALBUMIN SERPL-MCNC: 3.6 G/DL (ref 3.5–5.2)
ALBUMIN/GLOB SERPL: 1.8 G/DL
ALP SERPL-CCNC: 34 U/L (ref 39–117)
ALT SERPL W P-5'-P-CCNC: 62 U/L (ref 1–41)
ANION GAP SERPL CALCULATED.3IONS-SCNC: 14 MMOL/L (ref 5–15)
ANISOCYTOSIS BLD QL: ABNORMAL
AST SERPL-CCNC: 16 U/L (ref 1–40)
BILIRUB SERPL-MCNC: 0.5 MG/DL (ref 0–1.2)
BUN SERPL-MCNC: 21 MG/DL (ref 6–20)
BUN/CREAT SERPL: 28.8 (ref 7–25)
CALCIUM SPEC-SCNC: 8.1 MG/DL (ref 8.6–10.5)
CHLORIDE SERPL-SCNC: 94 MMOL/L (ref 98–107)
CO2 SERPL-SCNC: 20 MMOL/L (ref 22–29)
CORTIS SERPL-MCNC: 1.38 MCG/DL
CREAT SERPL-MCNC: 0.73 MG/DL (ref 0.76–1.27)
CREAT UR-MCNC: 75.9 MG/DL
DEPRECATED RDW RBC AUTO: 47.9 FL (ref 37–54)
ERYTHROCYTE [DISTWIDTH] IN BLOOD BY AUTOMATED COUNT: 16.2 % (ref 12.3–15.4)
GFR SERPL CREATININE-BSD FRML MDRD: 119 ML/MIN/1.73
GLOBULIN UR ELPH-MCNC: 2 GM/DL
GLUCOSE BLDC GLUCOMTR-MCNC: 143 MG/DL (ref 70–105)
GLUCOSE BLDC GLUCOMTR-MCNC: 176 MG/DL (ref 70–105)
GLUCOSE BLDC GLUCOMTR-MCNC: 187 MG/DL (ref 70–105)
GLUCOSE SERPL-MCNC: 201 MG/DL (ref 65–99)
HCT VFR BLD AUTO: 31.9 % (ref 37.5–51)
HGB BLD-MCNC: 10.8 G/DL (ref 13–17.7)
LAB AP CASE REPORT: NORMAL
LAB AP DIAGNOSIS COMMENT: NORMAL
LYMPHOCYTES # BLD MANUAL: 0.23 10*3/MM3 (ref 0.7–3.1)
LYMPHOCYTES NFR BLD MANUAL: 2 % (ref 19.6–45.3)
LYMPHOCYTES NFR BLD MANUAL: 3 % (ref 5–12)
Lab: NORMAL
MAGNESIUM SERPL-MCNC: 2.3 MG/DL (ref 1.6–2.6)
MCH RBC QN AUTO: 28.7 PG (ref 26.6–33)
MCHC RBC AUTO-ENTMCNC: 34 G/DL (ref 31.5–35.7)
MCV RBC AUTO: 84.5 FL (ref 79–97)
MONOCYTES # BLD AUTO: 0.35 10*3/MM3 (ref 0.1–0.9)
NEUTROPHILS # BLD AUTO: 11.02 10*3/MM3 (ref 1.7–7)
NEUTROPHILS NFR BLD MANUAL: 90 % (ref 42.7–76)
NEUTS BAND NFR BLD MANUAL: 5 % (ref 0–5)
OSMOLALITY SERPL: 276 MOSM/KG (ref 275–295)
OSMOLALITY UR: 826 MOSM/KG (ref 300–800)
PATH REPORT.FINAL DX SPEC: NORMAL
PATH REPORT.GROSS SPEC: NORMAL
PHOSPHATE SERPL-MCNC: 3 MG/DL (ref 2.5–4.5)
PLAT MORPH BLD: NORMAL
PLATELET # BLD AUTO: 148 10*3/MM3 (ref 140–450)
PMV BLD AUTO: 6.9 FL (ref 6–12)
POTASSIUM SERPL-SCNC: 4.8 MMOL/L (ref 3.5–5.2)
PROT SERPL-MCNC: 5.6 G/DL (ref 6–8.5)
RBC # BLD AUTO: 3.77 10*6/MM3 (ref 4.14–5.8)
SCAN SLIDE: NORMAL
SODIUM SERPL-SCNC: 128 MMOL/L (ref 136–145)
SODIUM UR-SCNC: 95 MMOL/L
T3FREE SERPL-MCNC: 1.76 PG/ML (ref 2–4.4)
T4 FREE SERPL-MCNC: 0.87 NG/DL (ref 0.93–1.7)
TSH SERPL DL<=0.05 MIU/L-ACNC: 0.12 UIU/ML (ref 0.27–4.2)
URATE SERPL-MCNC: 2.6 MG/DL (ref 3.4–7)
WBC # BLD AUTO: 11.6 10*3/MM3 (ref 3.4–10.8)
WBC MORPH BLD: NORMAL

## 2021-11-04 PROCEDURE — 84481 FREE ASSAY (FT-3): CPT | Performed by: HOSPITALIST

## 2021-11-04 PROCEDURE — 84443 ASSAY THYROID STIM HORMONE: CPT | Performed by: HOSPITALIST

## 2021-11-04 PROCEDURE — 82962 GLUCOSE BLOOD TEST: CPT

## 2021-11-04 PROCEDURE — 63710000001 INSULIN LISPRO (HUMAN) PER 5 UNITS: Performed by: NURSE PRACTITIONER

## 2021-11-04 PROCEDURE — 82533 TOTAL CORTISOL: CPT | Performed by: HOSPITALIST

## 2021-11-04 PROCEDURE — 83735 ASSAY OF MAGNESIUM: CPT | Performed by: NURSE PRACTITIONER

## 2021-11-04 PROCEDURE — 92523 SPEECH SOUND LANG COMPREHEN: CPT

## 2021-11-04 PROCEDURE — 85007 BL SMEAR W/DIFF WBC COUNT: CPT | Performed by: NURSE PRACTITIONER

## 2021-11-04 PROCEDURE — 84550 ASSAY OF BLOOD/URIC ACID: CPT | Performed by: HOSPITALIST

## 2021-11-04 PROCEDURE — 94799 UNLISTED PULMONARY SVC/PX: CPT

## 2021-11-04 PROCEDURE — 85025 COMPLETE CBC W/AUTO DIFF WBC: CPT | Performed by: NURSE PRACTITIONER

## 2021-11-04 PROCEDURE — 82570 ASSAY OF URINE CREATININE: CPT | Performed by: HOSPITALIST

## 2021-11-04 PROCEDURE — 83935 ASSAY OF URINE OSMOLALITY: CPT | Performed by: HOSPITALIST

## 2021-11-04 PROCEDURE — 97116 GAIT TRAINING THERAPY: CPT

## 2021-11-04 PROCEDURE — 97112 NEUROMUSCULAR REEDUCATION: CPT

## 2021-11-04 PROCEDURE — 99232 SBSQ HOSP IP/OBS MODERATE 35: CPT | Performed by: HOSPITALIST

## 2021-11-04 PROCEDURE — 80053 COMPREHEN METABOLIC PANEL: CPT | Performed by: NURSE PRACTITIONER

## 2021-11-04 PROCEDURE — 84439 ASSAY OF FREE THYROXINE: CPT | Performed by: HOSPITALIST

## 2021-11-04 PROCEDURE — 84100 ASSAY OF PHOSPHORUS: CPT | Performed by: NURSE PRACTITIONER

## 2021-11-04 PROCEDURE — 83930 ASSAY OF BLOOD OSMOLALITY: CPT | Performed by: HOSPITALIST

## 2021-11-04 PROCEDURE — 63710000001 DEXAMETHASONE PER 0.25 MG: Performed by: NEUROLOGICAL SURGERY

## 2021-11-04 PROCEDURE — 84560 ASSAY OF URINE/URIC ACID: CPT | Performed by: HOSPITALIST

## 2021-11-04 PROCEDURE — 84300 ASSAY OF URINE SODIUM: CPT | Performed by: HOSPITALIST

## 2021-11-04 RX ORDER — FLUCONAZOLE 100 MG/1
200 TABLET ORAL EVERY 24 HOURS
Status: DISCONTINUED | OUTPATIENT
Start: 2021-11-04 | End: 2021-11-05 | Stop reason: HOSPADM

## 2021-11-04 RX ORDER — SODIUM CHLORIDE 1000 MG
1 TABLET, SOLUBLE MISCELLANEOUS
Status: DISCONTINUED | OUTPATIENT
Start: 2021-11-04 | End: 2021-11-05 | Stop reason: HOSPADM

## 2021-11-04 RX ADMIN — FAMOTIDINE 20 MG: 20 TABLET, FILM COATED ORAL at 08:17

## 2021-11-04 RX ADMIN — SODIUM CHLORIDE, PRESERVATIVE FREE 10 ML: 5 INJECTION INTRAVENOUS at 08:17

## 2021-11-04 RX ADMIN — INSULIN LISPRO 2 UNITS: 100 INJECTION, SOLUTION INTRAVENOUS; SUBCUTANEOUS at 12:42

## 2021-11-04 RX ADMIN — FLUCONAZOLE 200 MG: 100 TABLET ORAL at 17:35

## 2021-11-04 RX ADMIN — Medication 1 G: at 17:35

## 2021-11-04 RX ADMIN — DEXAMETHASONE 4 MG: 4 TABLET ORAL at 17:35

## 2021-11-04 RX ADMIN — SUCRALFATE 1 G: 1 TABLET ORAL at 08:17

## 2021-11-04 RX ADMIN — AMLODIPINE BESYLATE 5 MG: 5 TABLET ORAL at 08:17

## 2021-11-04 RX ADMIN — INSULIN LISPRO 2 UNITS: 100 INJECTION, SOLUTION INTRAVENOUS; SUBCUTANEOUS at 08:16

## 2021-11-04 RX ADMIN — SUCRALFATE 1 G: 1 TABLET ORAL at 12:42

## 2021-11-04 RX ADMIN — SUCRALFATE 1 G: 1 TABLET ORAL at 21:12

## 2021-11-04 RX ADMIN — OXYCODONE HYDROCHLORIDE 5 MG: 5 TABLET ORAL at 08:17

## 2021-11-04 RX ADMIN — LEVETIRACETAM 500 MG: 500 TABLET ORAL at 21:13

## 2021-11-04 RX ADMIN — SUCRALFATE 1 G: 1 TABLET ORAL at 17:35

## 2021-11-04 RX ADMIN — Medication 1 G: at 12:44

## 2021-11-04 RX ADMIN — ENALAPRILAT 1.25 MG: 2.5 INJECTION INTRAVENOUS at 17:35

## 2021-11-04 RX ADMIN — OXYCODONE HYDROCHLORIDE 5 MG: 5 TABLET ORAL at 12:42

## 2021-11-04 RX ADMIN — INSULIN LISPRO 3 UNITS: 100 INJECTION, SOLUTION INTRAVENOUS; SUBCUTANEOUS at 21:23

## 2021-11-04 RX ADMIN — DEXAMETHASONE 4 MG: 4 TABLET ORAL at 05:09

## 2021-11-04 RX ADMIN — SODIUM CHLORIDE, PRESERVATIVE FREE 10 ML: 5 INJECTION INTRAVENOUS at 21:12

## 2021-11-04 RX ADMIN — FAMOTIDINE 20 MG: 20 TABLET, FILM COATED ORAL at 17:35

## 2021-11-04 RX ADMIN — Medication 1 G: at 09:47

## 2021-11-04 RX ADMIN — OXYCODONE HYDROCHLORIDE 5 MG: 5 TABLET ORAL at 01:56

## 2021-11-04 RX ADMIN — OXYCODONE HYDROCHLORIDE 5 MG: 5 TABLET ORAL at 17:35

## 2021-11-04 RX ADMIN — DEXAMETHASONE 4 MG: 4 TABLET ORAL at 12:42

## 2021-11-04 RX ADMIN — LEVETIRACETAM 500 MG: 500 TABLET ORAL at 08:17

## 2021-11-04 NOTE — PLAN OF CARE
Goal Outcome Evaluation:              Outcome Summary: Patient had some complaints of a headache during the night and was given PRN pain medication. He is on room air and vital signs stable.

## 2021-11-04 NOTE — THERAPY TREATMENT NOTE
Subjective: Pt agreeable to therapeutic plan of care.    Objective:     Bed mobility - N/A or Not attempted.  Transfers - SBA  Ambulation - 450 feet SBA    Pain: 0 VAS  Education: Provided education on importance of mobility and skilled verbal / tactile cueing throughout intervention.     Assessment: Yoan Salas presents with functional mobility impairments which indicate the need for skilled intervention. Tolerating session today without incident. Pt continues to demonstrate deficits with word finding this date, unable to name familiar objects. Pt demonstrates improved mobility however, ambulating increased distance requiring cueing for gait speed and to return to typical arm sway. Pt demonstrates slow careful gait pattern, non typical. Pt instructed and assisted with few UE coordination techniques and had difficulty but shows good participation, spouse educated in importance of coordination activities and demonstrates understanding. Will continue to follow and progress as tolerated.     Plan/Recommendations:   Pt would benefit from Home with family assist and Outpatient therapy at discharge from facility and requires no DME at discharge.   Pt cooperative; agreeable to therapeutic recommendations and plan of care.     Basic Mobility 6-click:  Rollin = Total, A lot = 2, A little = 3; 4 = None  Supine>Sit:   1 = Total, A lot = 2, A little = 3; 4 = None   Sit>Stand with arms:  1 = Total, A lot = 2, A little = 3; 4 = None  Bed>Chair:   1 = Total, A lot = 2, A little = 3; 4 = None  Ambulate in room:  1 = Total, A lot = 2, A little = 3; 4 = None  3-5 Steps with railin = Total, A lot = 2, A little = 3; 4 = None  Score: 23    Modified Eddy: 3 = Moderate disability (Requires some help, but able to walk unassisted)    Post-Tx Position: Up in Chair, Alarms activated and Call light and personal items within reach  PPE: gloves, surgical mask, eyewear protection

## 2021-11-04 NOTE — CASE MANAGEMENT/SOCIAL WORK
Continued Stay Note   Phil     Patient Name: Yoan Salas  MRN: [id number]  Today's Date: 11/4/2021    Admit Date: 11/2/2021     Discharge Plan     Row Name 11/04/21 0955       Plan    Plan D/C Plan : Home with spouse and [facility] they have accepted and orders are placed    Patient/Family in Agreement with Plan yes               Discharge Codes    No documentation.                 Met with patient in room wearing PPE: mask, face shield/goggles,     Maintained distance greater than six feet and spent less than 15 minutes in the room.        Mei Vaughan RN

## 2021-11-04 NOTE — PLAN OF CARE
Patient is a SIPS downgrade. A&O. Has trouble with month & year at times. Eating appropriately. Now on a 1500 cc fluid restriction. On room air. Vitals stable. Dressing over incision site changed per Dr Jean. Drain remains in place. Will continue to monitor.

## 2021-11-04 NOTE — PLAN OF CARE
Objective:   Bed mobility - N/A or Not attempted.  Transfers - SBA  Ambulation - 450 feet SBA    Assessment: Yoan Salas presents with functional mobility impairments which indicate the need for skilled intervention. Tolerating session today without incident. Pt continues to demonstrate deficits with word finding this date, unable to name familiar objects. Pt demonstrates improved mobility however, ambulating increased distance requiring cueing for gait speed and to return to typical arm sway. Pt demonstrates slow careful gait pattern, non typical. Pt instructed and assisted with few UE coordination techniques and had difficulty but shows good participation, spouse educated in importance of coordination activities and demonstrates understanding. Will continue to follow and progress as tolerated.

## 2021-11-04 NOTE — PROGRESS NOTES
PULMONARY/CRITICAL CARE PROGRESS NOTE       NAME:     Yoan Salas   AGE:     41 y.o.   SEX:  male   : 1980       MRN:       [id number]          RM: [facility] ICU      ASSESSMENT     F/U: S/p left temporoparietal craniotomy    Principal Problem:    Mass of temporoparietal region of brain  Active Problems:    Mild confusion, with speech issues and memory issues, related to brain mass    Hyperglycemia    GERD (gastroesophageal reflux disease)    Essential hypertension    Oral thrush    Class 3 severe obesity with body mass index (BMI) of 40.0 to 44.9 in adult       MULTIDISCIPLINARY ROUNDING     :  • Remains with hemovac, monitor output.  • Improved responsiveness.  • Off Cardene.  • Downgrade to SIPS monitor, consult hospitalist for medical management out of ICU.  • Advance diet as tolerated.    PLAN     Fibroblastic meningioma  Mild confusion, with speech issues and memory issues, related to brain mass  -Underwent pre-hospital work-up, presented for planned craniotomy with tumor resection of 4.9 cm temporoparietal mass  -Analgesia per neurosurgery recommendations.  -Neuro assessment per neurosurgery recommendations.  -Pathology consistent with fibroblastic meningioma.     Hyperglycemia  -Likely secondary to steroid use  -Hemoglobin A1c-6.2 on 11/3/2021  -strict glycemic control recommended  -Accuchecks AC/HS or Q6H with sliding scale insulin, based on diet     Essential hypertension  -Closely monitor patient's blood pressure, resume home medication regimen when appropriate  -Home regimen includes: Norvasc  -Norvasc 5 mg restarted 2021     Oral thrush  -Diflucan ordered per Neurosurgery  -Consider switching to Nystatin     GERD (gastroesophageal reflux disease)  -Continue PPI, Carafate     Class 3 severe obesity with body mass index (BMI) of 40.0 to 44.9 in adult  -Complicating aspects of care  - on lifestyle modifications to improve overall health     Code Status: CPR, Full  Support  VTE Prophylaxis: SCDs  PUD Prophylaxis: Pepcid      [place] & SUBJECTIVE     Yoan Salas is a 41 y.o. male  has a past medical history of Allergic, Anxiety, Asthma, Cancer (HCC), Depression, GERD (gastroesophageal reflux disease), and Hypertension.   Patient presented to [facility] for planned elective craniotomy on 11/2/2021 by Dr. Jean. Preoperative work-up revealed that patient had some word finding difficulty over the past several months that had became more severe. He also had admitted to some difficulty reading and some mild memory problems. Per patient's wife, he reportedly sometimes gets the wrong word and also gives the example that he is confused that there are both male and female pet cats. Patient attributed this to anxiety initially and was started on Prozac. Now that he was found with the diagnosis of meningioma, he states that he does not feel like he is depressed and is working with his family doctor to wean that medication. 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 Patient is with known mild reflux. He denied chest pain, shortness of breath but did admit to some mild occasional headaches. He denied any visual problems, numbness, weakness, incoordination or balance problems. Patient does not take any blood thinners, and is a non-smoker. Preoperative evaluation revealed an MRI of the brain with and without contrast that revealed a 4.9 cm dural based mass in his left parietal area with significant mass-effect and edema. It was recommended for patient to undergo craniotomy with tumor resection.       Patient on 11/2 underwent left temporoparietal craniotomy for resection of large meningioma by Dr. Jean with noted approximate blood loss of 1200 mL, with pathology pending. Patient had one subgaleal Hemovac placed. Patient was successfully extubated in PACU, and Pulmonary/Intensivist  consultation was requested for frequent neurologic monitoring while in ICU. Patient is currently recently postoperative, confused, moving all extremities but having difficulty with following commands at this time. Patient does know his name, but is answering yes and no questions variably, but feel this is most likely medication related versus an actual acute neurologic change, as these are the same behaviors and answers that patient was reporting while in PACU. We will closely monitor, neurosurgeon is aware.       DAILY UPDATES:  11/3: Patient resting in bed. Reports minimal pain at incisional site.  His pain a 4 out of 10 well controlled with medication.  Patient denies any nausea or vomiting, dizziness, shortness of breath, or abdominal pain.  Patient has no other complaints at this time.  11/4: Denies chest pain, nausea, vomiting, minimal incisional pain.    REVIEW OF SYSTEMS:  Pertinent items are noted in HPI, all other systems reviewed and negative      MEDICATIONS     SCHEDULED MEDICATIONS:   amLODIPine, 5 mg, Oral, Daily  dexamethasone, 4 mg, Oral, Q6H   Or  dexamethasone, 4 mg, Intravenous, Q6H  famotidine, 20 mg, Oral, BID AC  fluconazole, 400 mg, Intravenous, Q24H  insulin lispro, 0-7 Units, Subcutaneous, 4x Daily With Meals & Nightly  levETIRAcetam, 500 mg, Oral, Q12H  sodium chloride, 10 mL, Intravenous, Q12H  sodium chloride, 10 mL, Intravenous, Q12H  sucralfate, 1 g, Oral, 4x Daily AC & at Bedtime        CONTINUOUS INFUSIONS:   niCARdipine, 5-15 mg/hr, Last Rate: Stopped (11/03/21 1415)  sodium chloride, 50 mL/hr, Last Rate: 50 mL/hr (11/03/21 1415)        PRN MEDS:  •  acetaminophen **OR** acetaminophen  •  albuterol sulfate HFA  •  aluminum-magnesium hydroxide-simethicone  •  dextrose  •  dextrose  •  enalaprilat  •  glucagon (human recombinant)  •  insulin lispro **AND** insulin lispro  •  magnesium sulfate **OR** magnesium sulfate in D5W 1g/100mL (PREMIX)  •  [DISCONTINUED] Morphine **AND**  "naloxone  •  nitroglycerin  •  ondansetron **OR** ondansetron  •  oxyCODONE  •  potassium chloride **OR** potassium chloride **OR** potassium chloride  •  potassium phosphate infusion greater than 15 mMoles **OR** potassium phosphate infusion greater than 15 mMoles **OR** potassium phosphate **OR** sodium phosphate IVPB **OR** sodium phosphate IVPB **OR** sodium phosphate IVPB  •  promethazine  •  sodium chloride  •  sodium chloride    OBJECTIVE     VITAL SIGNS:  /96   Pulse 90   Temp 97.7 °F (36.5 °C) (Oral)   Resp 17   Ht 167.6 cm (65.98\")   Wt 130 kg (285 lb 15 oz)   SpO2 95%   BMI 46.17 kg/m²     I/O FROM PREVIOUS 3 SHIFTS:  I/O last 3 completed shifts:  In: 7082 [P.O.:2550; I.V.:4532]  Out: 5020 [Urine:4850; Drains:170]    NET BALANCE SINCE ADMISSION:  Net IO Since Admission: 4,537 mL [11/04/21 0730]     I/O PREVIOUS 24 HOURS:   Intake/Output                       11/02/21 0701 - 11/03/21 0700 11/03/21 0701 - 11/04/21 0700     1445-6943 8048-8541 Total 1897-2340 3692-2724 Total                 Intake    P.O.  --  360 360  1470  720 2190    I.V.  3245  2126 5371  --  2406 2406    Blood  600  -- 600  --  -- --    Volume (Transfuse RBC) 300 -- 300 -- -- --    Volume (Transfuse RBC) 300 -- 300 -- -- --    IV Piggyback  830  -- 830  --  -- --    Total Intake 4675 2486 7161 1470 3126 4596       Output    Urine  1200  1150 2350  1300  2400 3700    Drains  --  100 100  --  70 70    Blood  1000  -- 1000  --  -- --    Total Output 2200 1250 3450 1300 2470 3770             BOWEL MOVEMENTS:  Stool Output  Stool Unmeasured Occurrence: 1 (11/03/21 1405)  Bowel Incontinence: No (11/03/21 1405)  Stool Amount: moderate (11/03/21 1405)       PHYSICAL EXAM:  General Appearance:  Alert, cooperative, no distress, appears stated age  Head:  Normocephalic, without obvious abnormality, surgical incision left parietal region-dressing intact  Eyes:  PERRL, conjunctiva/corneas clear, EOM's intact     Neck:  Supple, trachea " midline  Lungs:   Clear and equal bilaterally, respirations unlabored without accessory muscle use, Oertli on 2 L nasal cannula  Chest wall:  No tenderness  Heart: Denies tachycardia, S1 and S2 normal, no murmur, rub or gallop  Abdomen:  Soft, non-tender, bowel sounds active all four quadrants  Extremities:  Extremities normal, no cyanosis or edema  Pulses: 2+ bilateral radial pulses, 2+ bilateral DP pulses, 2+ bilateral PT pulses  Skin:  No rashes or lesions, surgical incision left parietal region-dressing intact  Neurologic:   Alert and oriented, no focal deficits      RESULTS     LABS:  Lab Results (last 24 hours)     Procedure Component Value Units Date/Time    Manual Differential [[id number]]  (Abnormal) Collected: 11/04/21 0510    Specimen: Blood Updated: 11/04/21 0657     Neutrophil % 90.0 %      Lymphocyte % 2.0 %      Monocyte % 3.0 %      Bands %  5.0 %      Neutrophils Absolute 11.02 10*3/mm3      Lymphocytes Absolute 0.23 10*3/mm3      Monocytes Absolute 0.35 10*3/mm3      Anisocytosis Slight/1+     WBC Morphology Normal     Platelet Morphology Normal    CBC & Differential [[id number]]  (Abnormal) Collected: 11/04/21 0510    Specimen: Blood Updated: 11/04/21 0657    Narrative:      The following orders were created for panel order CBC & Differential.  Procedure                               Abnormality         Status                     ---------                               -----------         ------                     CBC Auto Differential[[id number]]        Abnormal            Final result               Scan Slide[[id number]]                                       Final result                 Please view results for these tests on the individual orders.    Scan Slide [[id number]] Collected: 11/04/21 0510    Specimen: Blood Updated: 11/04/21 0657     Scan Slide --     Comment: See Manual Differential Results       CBC Auto Differential [[id number]]  (Abnormal) Collected: 11/04/21 0510    Specimen: Blood  Updated: 11/04/21 0657     WBC 11.60 10*3/mm3      RBC 3.77 10*6/mm3      Hemoglobin 10.8 g/dL      Hematocrit 31.9 %      MCV 84.5 fL      MCH 28.7 pg      MCHC 34.0 g/dL      RDW 16.2 %      RDW-SD 47.9 fl      MPV 6.9 fL      Platelets 148 10*3/mm3     Narrative:      The previously reported component NRBC is no longer being reported. Previous result was 0.0 /100 WBC (Reference Range: 0.0-0.2 /100 WBC) on 11/4/2021 at 0541 EDT.    Comprehensive Metabolic Panel [[id number]]  (Abnormal) Collected: 11/04/21 0510    Specimen: Blood Updated: 11/04/21 0542     Glucose 201 mg/dL      BUN 21 mg/dL      Creatinine 0.73 mg/dL      Sodium 128 mmol/L      Potassium 4.8 mmol/L      Chloride 94 mmol/L      CO2 20.0 mmol/L      Calcium 8.1 mg/dL      Total Protein 5.6 g/dL      Albumin 3.60 g/dL      ALT (SGPT) 62 U/L      AST (SGOT) 16 U/L      Alkaline Phosphatase 34 U/L      Total Bilirubin 0.5 mg/dL      eGFR Non African Amer 119 mL/min/1.73      Globulin 2.0 gm/dL      A/G Ratio 1.8 g/dL      BUN/Creatinine Ratio 28.8     Anion Gap 14.0 mmol/L     Narrative:      GFR Normal >60  Chronic Kidney Disease <60  Kidney Failure <15      Phosphorus [[id number]]  (Normal) Collected: 11/04/21 0510    Specimen: Blood Updated: 11/04/21 0542     Phosphorus 3.0 mg/dL     Magnesium [[id number]]  (Normal) Collected: 11/04/21 0510    Specimen: Blood Updated: 11/04/21 0541     Magnesium 2.3 mg/dL     POC Glucose Once [[id number]]  (Abnormal) Collected: 11/03/21 2000    Specimen: Blood Updated: 11/03/21 2001     Glucose 200 mg/dL      Comment: Serial Number: [id number]:  [id number]       POC Glucose Once [[id number]]  (Abnormal) Collected: 11/03/21 1648    Specimen: Blood Updated: 11/03/21 1649     Glucose 162 mg/dL      Comment: Serial Number: [id number]:  [id number]       Basic Metabolic Panel [[id number]]  (Abnormal) Collected: 11/03/21 1117    Specimen: Blood Updated: 11/03/21 1150     Glucose 175 mg/dL      BUN 22 mg/dL       Creatinine 0.69 mg/dL      Sodium 129 mmol/L      Potassium 4.1 mmol/L      Chloride 92 mmol/L      CO2 23.0 mmol/L      Calcium 7.9 mg/dL      eGFR Non African Amer 127 mL/min/1.73      BUN/Creatinine Ratio 31.9     Anion Gap 14.0 mmol/L     Narrative:      GFR Normal >60  Chronic Kidney Disease <60  Kidney Failure <15      POC Glucose Once [[id number]]  (Abnormal) Collected: 11/03/21 1117    Specimen: Blood Updated: 11/03/21 1119     Glucose 176 mg/dL      Comment: Serial Number: [id number]:  [id number]       Hemoglobin A1c [[id number]]  (Abnormal) Collected: 11/03/21 0528    Specimen: Blood Updated: 11/03/21 0959     Hemoglobin A1C 6.2 %     Narrative:      Hemoglobin A1C Reference Range:    <5.7 %        Normal  5.7-6.4 %     Increased risk for diabetes  > 6.4 %        Diabetes       These guidelines have been recommended by the American Diabetic Association for Hgb A1c.      The following 2010 guidelines have been recommended by the American Diabetes Association for Hemoglobin A1c.    HBA1c 5.7-6.4% Increased risk for future diabetes (pre-diabetes)  HBA1c     >6.4% Diabetes             RADIOLOGY:  No Radiology Exams Resulted Within Past 24 Hours    ECHOCARDIOGRAM:   No previous comparisons available for review.    I reviewed the patient's new clinical results.    This note has been scribed by me for pulmonary attending physician.    Electronically signed by HI Hayes, 11/04/21 at 07:30 EDT.

## 2021-11-04 NOTE — THERAPY EVALUATION
Acute Care - Speech Language Pathology Initial Evaluation   Phil     Patient Name: Yoan Salas  : 1980  MRN: [id number]  Today's Date: 2021               Admit Date: 2021     Visit Dx:    ICD-10-CM ICD-9-CM   1. Brain tumor (HCC)  D49.6 239.6     Patient Active Problem List   Diagnosis   • Leukemia in remission, childhood; S/P Radiation   • Class 3 severe obesity with body mass index (BMI) of 40.0 to 44.9 in adult   • Mass of temporoparietal region of brain   • Mild confusion, with speech issues and memory issues, related to brain mass   • Hyperglycemia   • GERD (gastroesophageal reflux disease)   • Essential hypertension   • Oral thrush     Past Medical History:   Diagnosis Date   • Allergic    • Anxiety    • Asthma    • Cancer (HCC)     ALL as child    • Class 3 severe obesity with body mass index (BMI) of 40.0 to 44.9 in adult 2021   • Depression    • GERD (gastroesophageal reflux disease)    • Hypertension    • Leukemia in remission, childhood; S/P Radiation      Past Surgical History:   Procedure Laterality Date   • CRANIOTOMY FOR TUMOR Left 2021    Procedure: CRANIOTOMY FOR RESECTION OF LEFT PARIETAL TUMOR;  Surgeon: Francois Jean MD;  Location: [facility];  Service: Neurosurgery;  Laterality: Left;   • HERNIA REPAIR      6 MONTHS OLD   • PORTACATH PLACEMENT      CHILDHOOD       SLP Recommendation and Plan  SLP Diagnosis: Patient demonstrates severe speech/language deficits. (21 1000)        SLC Criteria for Skilled Therapy Interventions Met: yes (21 1000)           Predicted Duration Therapy Intervention (Days): until discharge (21 1000)         SLP EVALUATION (last 72 hours)     SLP SLC Evaluation     Row Name 21 1000          Document Type evaluation  -CB    Subjective Information no complaints  -CB    Patient Observations alert; cooperative; agree to therapy  -CB    Patient/Family/Caregiver Comments/Observations Patient's wife was  present during evaluation.  -CB    Patient Effort good  -CB    Comment Patient demonstrated severe speech/language and cognitive deficits characterized by decrease automatic responses, biographical information, personally relevant information, repeating multi syllabic words, decrease reading skills, confrontation naming, decrease yes/no questions, decrease orientaion, memory, organizational thought processes, simple math computations and overall auditory processing.It is recommended that patient receive skilled ST services to address above deficits  -CB               Patient Profile Reviewed yes  -CB    Pertinent History Of Current Problem This really pleasant 41-year-old man has had some word finding difficulty over the past several months.  Its become much more severe over the past few months.  He is also had some difficulty reading and some mild memory problems.  His wife says that sometimes he gets the wrong word and also gives the example that he is confused there male and female pet cats.  Initially, he attributed this to anxiety and he started on Prozac.  Now that he has a diagnosis, he says he is really not depressed and he is working with his family doctor to wean that medication.  He had childhood leukemia and was treated with some experimental form of radiation which did apparently involve his head area.  He has mild gastroesophageal reflux disease.  No chest pain or shortness of breath.  Only mild occasional headaches.  No visual problems.  No numbness or weakness or incoordination or balance problems.  Does not take any blood thinners.  Non-smoker. 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  -CB               Additional Documentation Pain Scale: FACES Pre/Post-Treatment (Group)  -CB               Pretreatment Pain Rating 0/10 - no pain  -CB    Posttreatment Pain Rating 0/10 - no pain  -CB               Cognitive Function (Cognition) severe impairment   -CB    Orientation Status (Cognition) person; place; time  -CB    Memory (Cognitive) simple; immediate; delayed; mental manipulation; severe impairment  -CB    Attention (Cognitive) sustained; severe impairment  -CB    Thought Organization (Cognitive) concrete divergent; severe impairment  -CB    Reasoning (Cognitive) mental flexibility; severe impairment  -CB    Problem Solving (Cognitive) divergent; severe impairment  -CB    Functional Math (Cognitive) simple; severe impairment  -CB    Executive Function (Cognition) deficit awareness; planning; severe impairment  -CB    Cognition, Comment Patient scored a 7/30 on SLUMS.  -CB               SLP Diagnosis Patient demonstrates severe speech/language deficits.  -CB    Rehab Potential/Prognosis good  -CB    SLC Criteria for Skilled Therapy Interventions Met yes  -CB    Functional Impact functional impact in ADLs; unable to make medical decisions; difficulty communicating wants, needs; decreased ability to respond to situations safely  -CB               Therapy Frequency (SLP SLC) PRN  -CB    Predicted Duration Therapy Intervention (Days) until discharge  -CB               Auditory Comprehension Treatment Objectives Auditory Comprehension Treatment Objectives (Group)  -CB    Verbal Expression Treatment Objectives Verbal Expression Treatment Objectives (Group)  -CB    Cognitive Linguistic Treatment Objectives Cognitive Linguistic Treatment Objectives (Group)  -CB               Comprehend Questions Selection comprehend questions, SLP goal 1  -CB    Follow Directions Selection follow directions, SLP goal 1  -CB               Improve Ability to Comprehend Questions Goal 1 (SLP) questions about personal information; simple yes/no questions; yes/no questions about short paragraph; 90%; with minimal cues (75-90%)  -CB    Time Frame (Comprehend Questions Goal 1, SLP) short term goal (STG)  -CB               Improve Ability to Follow Directions Goal 1 (SLP) 1 step direction  without objects; 2 step commands; 90%; independently (over 90% accuracy)  -CB               Word Retrieval Skills Selection word retrieval, SLP goal 1  -CB               Improve Word Retrieval Skills By Goal 1 (SLP) completing automatic speech task, counting; completing automatic speech task, days of the week; completing automatic speech task, months; confrontational naming task; responsive naming task; repeating phrases; repeating sentences; answer WH question with one word; completing a divergent task; 90%; with minimal cues (75-90%)  -CB    Barriers (Word Retrieval Goal 1, SLP) Patient requires prompting at times.  -CB               Awareness of Basic Personal Information Selection awareness of basic personal information, SLP goal 1  -CB    Attention Selection attention, SLP goal 1  -CB    Orientation Selection orientation, SLP goal 1  -CB    Memory Skills Selection memory skills, SLP goal 1  -CB    Organizational Skills Selection organizational skills, SLP goal 1  -CB    Functional Math Skills Selection functional math skills, SLP goal 1  -CB    Executive Function Skills Selection executive function skills, SLP goal 1  -CB               Improve Awareness of Basic Personal Information Goal 1 (SLP) answering yes/no questions regarding personal/biographical information; naming self, family members; answering open-ended questions regarding personal/biographical information; 90%; with minimal cues (75-90%)  -CB    Time Frame (Awareness of Basic Personal Information Goal 1, SLP) short term goal (STG)  -CB    Barriers (Awareness of Basic Personal Information Goal 1, SLP) Patient is inconsistent with skills.  -CB               Improve Attention by Goal 1 (SLP) complete sustained attention task; 90%; with minimal cues (75-90%)  -CB    Time Frame (Attention Goal 1, SLP) short term goal (STG)  -CB    Barriers (Attention Goal 1, SLP) Patient exhibits decrease processing.  -CB               Improve Orientation Through Goal 1  (SLP) demonstrating orientation to day; demonstrating orientation to month; demonstrating orientation to year; demonstrating orientation to place; use environmental aids to assist with orientation; 90%; with minimal cues (75-90%)  -CB    Time Frame (Orientation Goal 1, SLP) short term goal (STG)  -CB               Improve Memory Skills Through Goal 1 (SLP) visual memory task; listen to a paragraph and answer questions; read a paragraph and answer questions; use memory strategies; recall details of the day; select a word from a list by exclusion; 80%; 90%; with minimal cues (75-90%)  -CB    Time Frame (Memory Skills Goal 1, SLP) short term goal (STG)  -CB               Improve Thought Organization Through Goal 1 (SLP) completing a divergent naming task; generating a list of items in a category; naming by category exclusion; completing mental manipulation task; concrete; 90%; with minimal cues (75-90%)  -CB    Time Frame (Thought Organization Skills Goal 1, SLP) short term goal (STG)  -CB               Improve Functional Math Skills Through Goal 1 (SLP) complete simple math problems; complete functional math task; complete word problems involving time; complete word problems involving money; complete checkbook task; 80%; with moderate cues (50-74%)  -CB    Barriers (Functional Math Skills Goal 1, SLP) Patient has decrease processing.  -CB               Improve Executive Function Skills Goal 1 (SLP) organization/planning activity; 80%; with minimal cues (75-90%)  -CB    Time Frame (Executive Function Skills Goal 1, SLP) short term goal (STG)  -CB          User Key  (r) = Recorded By, (t) = Taken By, (c) = Cosigned By    Initials Name Effective Dates    Eimlee Carolina SLP 09/21/21 -                    EDUCATION  The patient has been educated in the following areas:     Cognitive Impairment Communication Impairment.           SLP GOALS     Row Name 11/04/21 1000          Improve Ability to Comprehend Questions  Goal 1 (SLP) questions about personal information; simple yes/no questions; yes/no questions about short paragraph; 90%; with minimal cues (75-90%)  -CB    Time Frame (Comprehend Questions Goal 1, SLP) short term goal (STG)  -CB               Improve Ability to Follow Directions Goal 1 (SLP) 1 step direction without objects; 2 step commands; 90%; independently (over 90% accuracy)  -CB               Improve Word Retrieval Skills By Goal 1 (SLP) completing automatic speech task, counting; completing automatic speech task, days of the week; completing automatic speech task, months; confrontational naming task; responsive naming task; repeating phrases; repeating sentences; answer WH question with one word; completing a divergent task; 90%; with minimal cues (75-90%)  -CB    Barriers (Word Retrieval Goal 1, SLP) Patient requires prompting at times.  -CB               Improve Awareness of Basic Personal Information Goal 1 (SLP) answering yes/no questions regarding personal/biographical information; naming self, family members; answering open-ended questions regarding personal/biographical information; 90%; with minimal cues (75-90%)  -CB    Time Frame (Awareness of Basic Personal Information Goal 1, SLP) short term goal (STG)  -CB    Barriers (Awareness of Basic Personal Information Goal 1, SLP) Patient is inconsistent with skills.  -CB               Improve Attention by Goal 1 (SLP) complete sustained attention task; 90%; with minimal cues (75-90%)  -CB    Time Frame (Attention Goal 1, SLP) short term goal (STG)  -CB    Barriers (Attention Goal 1, SLP) Patient exhibits decrease processing.  -CB               Improve Orientation Through Goal 1 (SLP) demonstrating orientation to day; demonstrating orientation to month; demonstrating orientation to year; demonstrating orientation to place; use environmental aids to assist with orientation; 90%; with minimal cues (75-90%)  -CB    Time Frame (Orientation Goal 1, SLP) short  term goal (STG)  -CB               Improve Memory Skills Through Goal 1 (SLP) visual memory task; listen to a paragraph and answer questions; read a paragraph and answer questions; use memory strategies; recall details of the day; select a word from a list by exclusion; 80%; 90%; with minimal cues (75-90%)  -CB    Time Frame (Memory Skills Goal 1, SLP) short term goal (STG)  -CB               Improve Thought Organization Through Goal 1 (SLP) completing a divergent naming task; generating a list of items in a category; naming by category exclusion; completing mental manipulation task; concrete; 90%; with minimal cues (75-90%)  -CB    Time Frame (Thought Organization Skills Goal 1, SLP) short term goal (STG)  -CB               Improve Functional Math Skills Through Goal 1 (SLP) complete simple math problems; complete functional math task; complete word problems involving time; complete word problems involving money; complete checkbook task; 80%; with moderate cues (50-74%)  -CB    Barriers (Functional Math Skills Goal 1, SLP) Patient has decrease processing.  -CB               Improve Executive Function Skills Goal 1 (SLP) organization/planning activity; 80%; with minimal cues (75-90%)  -CB    Time Frame (Executive Function Skills Goal 1, SLP) short term goal (STG)  -CB          User Key  (r) = Recorded By, (t) = Taken By, (c) = Cosigned By    Initials Name Provider Type    Emilee Carolina SLP Speech and Language Pathologist                        Time Calculation:                        DEANDRE Springer  11/4/2021

## 2021-11-04 NOTE — PLAN OF CARE
Goal Outcome Evaluation:     Patient demonstrated severe speech/language and cognitive deficits characterized by decrease automatic responses, biographical information, personally relevant information, repeating multi syllabic words, decrease reading skills, confrontation naming, decrease yes/no questions, decrease orientaion, memory, organizational thought processes, simple math computations and overall auditory processing.It is recommended that patient receive skilled ST services to address above deficit

## 2021-11-04 NOTE — PROGRESS NOTES
Doing really well but worsening hyponatremia.  Will consult IM.  OK for transfer [facility].  Mobilize.  Keep hemovac for now.  Poss home tomorrow if Na+ better

## 2021-11-05 ENCOUNTER — APPOINTMENT (OUTPATIENT)
Dept: CARDIOLOGY | Facility: HOSPITAL | Age: 41
End: 2021-11-05

## 2021-11-05 ENCOUNTER — READMISSION MANAGEMENT (OUTPATIENT)
Dept: CALL CENTER | Facility: HOSPITAL | Age: 41
End: 2021-11-05

## 2021-11-05 ENCOUNTER — HOME HEALTH ADMISSION (OUTPATIENT)
Dept: HOME HEALTH SERVICES | Facility: HOME HEALTHCARE | Age: 41
End: 2021-11-05

## 2021-11-05 VITALS
TEMPERATURE: 98.2 F | SYSTOLIC BLOOD PRESSURE: 171 MMHG | OXYGEN SATURATION: 96 % | RESPIRATION RATE: 19 BRPM | HEART RATE: 115 BPM | HEIGHT: 66 IN | DIASTOLIC BLOOD PRESSURE: 101 MMHG | WEIGHT: 285.94 LBS | BODY MASS INDEX: 45.95 KG/M2

## 2021-11-05 PROBLEM — R41.0 CONFUSION: Status: RESOLVED | Noted: 2021-11-02 | Resolved: 2021-11-05

## 2021-11-05 PROBLEM — G93.89 MASS OF TEMPOROPARIETAL REGION OF BRAIN: Status: RESOLVED | Noted: 2021-11-02 | Resolved: 2021-11-05

## 2021-11-05 LAB
ALBUMIN SERPL-MCNC: 3.5 G/DL (ref 3.5–5.2)
ALBUMIN/GLOB SERPL: 1.9 G/DL
ALP SERPL-CCNC: 32 U/L (ref 39–117)
ALT SERPL W P-5'-P-CCNC: 91 U/L (ref 1–41)
ANION GAP SERPL CALCULATED.3IONS-SCNC: 12 MMOL/L (ref 5–15)
AST SERPL-CCNC: 27 U/L (ref 1–40)
BASOPHILS # BLD AUTO: 0 10*3/MM3 (ref 0–0.2)
BASOPHILS NFR BLD AUTO: 0.5 % (ref 0–1.5)
BILIRUB SERPL-MCNC: 0.4 MG/DL (ref 0–1.2)
BUN SERPL-MCNC: 23 MG/DL (ref 6–20)
BUN/CREAT SERPL: 39 (ref 7–25)
CALCIUM SPEC-SCNC: 8.2 MG/DL (ref 8.6–10.5)
CHLORIDE SERPL-SCNC: 95 MMOL/L (ref 98–107)
CO2 SERPL-SCNC: 22 MMOL/L (ref 22–29)
CREAT SERPL-MCNC: 0.59 MG/DL (ref 0.76–1.27)
DEPRECATED RDW RBC AUTO: 47.3 FL (ref 37–54)
EOSINOPHIL # BLD AUTO: 0 10*3/MM3 (ref 0–0.4)
EOSINOPHIL NFR BLD AUTO: 0.1 % (ref 0.3–6.2)
ERYTHROCYTE [DISTWIDTH] IN BLOOD BY AUTOMATED COUNT: 16.2 % (ref 12.3–15.4)
GFR SERPL CREATININE-BSD FRML MDRD: >150 ML/MIN/1.73
GLOBULIN UR ELPH-MCNC: 1.8 GM/DL
GLUCOSE BLDC GLUCOMTR-MCNC: 163 MG/DL (ref 70–105)
GLUCOSE BLDC GLUCOMTR-MCNC: 211 MG/DL (ref 70–105)
GLUCOSE BLDC GLUCOMTR-MCNC: 230 MG/DL (ref 70–105)
GLUCOSE SERPL-MCNC: 202 MG/DL (ref 65–99)
HCT VFR BLD AUTO: 29.5 % (ref 37.5–51)
HGB BLD-MCNC: 10.2 G/DL (ref 13–17.7)
LYMPHOCYTES # BLD AUTO: 0.3 10*3/MM3 (ref 0.7–3.1)
LYMPHOCYTES NFR BLD AUTO: 4.5 % (ref 19.6–45.3)
MAGNESIUM SERPL-MCNC: 2.4 MG/DL (ref 1.6–2.6)
MCH RBC QN AUTO: 29 PG (ref 26.6–33)
MCHC RBC AUTO-ENTMCNC: 34.5 G/DL (ref 31.5–35.7)
MCV RBC AUTO: 83.9 FL (ref 79–97)
MONOCYTES # BLD AUTO: 0.4 10*3/MM3 (ref 0.1–0.9)
MONOCYTES NFR BLD AUTO: 5.7 % (ref 5–12)
NEUTROPHILS NFR BLD AUTO: 6.8 10*3/MM3 (ref 1.7–7)
NEUTROPHILS NFR BLD AUTO: 89.2 % (ref 42.7–76)
NRBC BLD AUTO-RTO: 0 /100 WBC (ref 0–0.2)
NT-PROBNP SERPL-MCNC: 106.2 PG/ML (ref 0–450)
PHOSPHATE SERPL-MCNC: 3 MG/DL (ref 2.5–4.5)
PLATELET # BLD AUTO: 132 10*3/MM3 (ref 140–450)
PMV BLD AUTO: 7 FL (ref 6–12)
POTASSIUM SERPL-SCNC: 4.9 MMOL/L (ref 3.5–5.2)
PROT SERPL-MCNC: 5.3 G/DL (ref 6–8.5)
RBC # BLD AUTO: 3.51 10*6/MM3 (ref 4.14–5.8)
SODIUM SERPL-SCNC: 129 MMOL/L (ref 136–145)
WBC # BLD AUTO: 7.6 10*3/MM3 (ref 3.4–10.8)

## 2021-11-05 PROCEDURE — G0008 ADMIN INFLUENZA VIRUS VAC: HCPCS | Performed by: NEUROLOGICAL SURGERY

## 2021-11-05 PROCEDURE — 82962 GLUCOSE BLOOD TEST: CPT

## 2021-11-05 PROCEDURE — 25010000002 DEXAMETHASONE PER 1 MG: Performed by: NEUROLOGICAL SURGERY

## 2021-11-05 PROCEDURE — 63710000001 DEXAMETHASONE PER 0.25 MG: Performed by: NEUROLOGICAL SURGERY

## 2021-11-05 PROCEDURE — 93010 ELECTROCARDIOGRAM REPORT: CPT | Performed by: INTERNAL MEDICINE

## 2021-11-05 PROCEDURE — 93005 ELECTROCARDIOGRAM TRACING: CPT | Performed by: HOSPITALIST

## 2021-11-05 PROCEDURE — 97116 GAIT TRAINING THERAPY: CPT

## 2021-11-05 PROCEDURE — 97112 NEUROMUSCULAR REEDUCATION: CPT

## 2021-11-05 PROCEDURE — 85025 COMPLETE CBC W/AUTO DIFF WBC: CPT | Performed by: NURSE PRACTITIONER

## 2021-11-05 PROCEDURE — 83735 ASSAY OF MAGNESIUM: CPT | Performed by: NURSE PRACTITIONER

## 2021-11-05 PROCEDURE — 90686 IIV4 VACC NO PRSV 0.5 ML IM: CPT | Performed by: NEUROLOGICAL SURGERY

## 2021-11-05 PROCEDURE — 84100 ASSAY OF PHOSPHORUS: CPT | Performed by: NURSE PRACTITIONER

## 2021-11-05 PROCEDURE — 63710000001 INSULIN LISPRO (HUMAN) PER 5 UNITS: Performed by: NURSE PRACTITIONER

## 2021-11-05 PROCEDURE — 80053 COMPREHEN METABOLIC PANEL: CPT | Performed by: NURSE PRACTITIONER

## 2021-11-05 PROCEDURE — 25010000002 INFLUENZA VAC SPLIT QUAD 0.5 ML SUSPENSION PREFILLED SYRINGE: Performed by: NEUROLOGICAL SURGERY

## 2021-11-05 PROCEDURE — 92507 TX SP LANG VOICE COMM INDIV: CPT

## 2021-11-05 PROCEDURE — 97110 THERAPEUTIC EXERCISES: CPT

## 2021-11-05 PROCEDURE — 99232 SBSQ HOSP IP/OBS MODERATE 35: CPT | Performed by: HOSPITALIST

## 2021-11-05 PROCEDURE — 83880 ASSAY OF NATRIURETIC PEPTIDE: CPT | Performed by: HOSPITALIST

## 2021-11-05 PROCEDURE — 97530 THERAPEUTIC ACTIVITIES: CPT

## 2021-11-05 RX ORDER — LEVETIRACETAM 500 MG/1
500 TABLET ORAL 2 TIMES DAILY
Qty: 60 TABLET | Refills: 0 | Status: SHIPPED | OUTPATIENT
Start: 2021-11-05 | End: 2021-11-05 | Stop reason: SDUPTHER

## 2021-11-05 RX ORDER — FLUCONAZOLE 200 MG/1
200 TABLET ORAL DAILY
Qty: 5 TABLET | Refills: 0 | Status: SHIPPED | OUTPATIENT
Start: 2021-11-05 | End: 2021-11-05 | Stop reason: SDUPTHER

## 2021-11-05 RX ORDER — SODIUM CHLORIDE 1000 MG
1 TABLET, SOLUBLE MISCELLANEOUS
Qty: 21 TABLET | Refills: 0 | Status: SHIPPED | OUTPATIENT
Start: 2021-11-05 | End: 2022-11-23

## 2021-11-05 RX ORDER — OXYCODONE HYDROCHLORIDE AND ACETAMINOPHEN 5; 325 MG/1; MG/1
1 TABLET ORAL EVERY 4 HOURS PRN
Qty: 30 TABLET | Refills: 0 | Status: SHIPPED | OUTPATIENT
Start: 2021-11-05 | End: 2022-11-23

## 2021-11-05 RX ORDER — FLUCONAZOLE 200 MG/1
200 TABLET ORAL DAILY
Qty: 5 TABLET | Refills: 0 | Status: SHIPPED | OUTPATIENT
Start: 2021-11-05 | End: 2021-11-10

## 2021-11-05 RX ORDER — LEVETIRACETAM 500 MG/1
500 TABLET ORAL 2 TIMES DAILY
Qty: 60 TABLET | Refills: 0 | Status: SHIPPED | OUTPATIENT
Start: 2021-11-05 | End: 2022-11-23 | Stop reason: SDUPTHER

## 2021-11-05 RX ORDER — DEXAMETHASONE 2 MG/1
TABLET ORAL
Qty: 21 TABLET | Refills: 0 | Status: SHIPPED | OUTPATIENT
Start: 2021-11-05 | End: 2021-11-05 | Stop reason: SDUPTHER

## 2021-11-05 RX ORDER — METOPROLOL SUCCINATE 25 MG/1
25 TABLET, EXTENDED RELEASE ORAL
Status: DISCONTINUED | OUTPATIENT
Start: 2021-11-05 | End: 2021-11-05 | Stop reason: HOSPADM

## 2021-11-05 RX ORDER — OXYCODONE HYDROCHLORIDE AND ACETAMINOPHEN 5; 325 MG/1; MG/1
1 TABLET ORAL EVERY 4 HOURS PRN
Qty: 30 TABLET | Refills: 0 | Status: SHIPPED | OUTPATIENT
Start: 2021-11-05 | End: 2021-11-05 | Stop reason: SDUPTHER

## 2021-11-05 RX ORDER — SODIUM CHLORIDE 1000 MG
1 TABLET, SOLUBLE MISCELLANEOUS
Qty: 21 TABLET | Refills: 0 | Status: SHIPPED | OUTPATIENT
Start: 2021-11-05 | End: 2021-11-05 | Stop reason: SDUPTHER

## 2021-11-05 RX ORDER — DEXAMETHASONE 2 MG/1
TABLET ORAL
Qty: 21 TABLET | Refills: 0 | Status: SHIPPED | OUTPATIENT
Start: 2021-11-05 | End: 2022-11-23

## 2021-11-05 RX ADMIN — SUCRALFATE 1 G: 1 TABLET ORAL at 08:30

## 2021-11-05 RX ADMIN — SUCRALFATE 1 G: 1 TABLET ORAL at 11:38

## 2021-11-05 RX ADMIN — DEXAMETHASONE SODIUM PHOSPHATE 4 MG: 4 INJECTION, SOLUTION INTRAMUSCULAR; INTRAVENOUS at 00:50

## 2021-11-05 RX ADMIN — SODIUM CHLORIDE, PRESERVATIVE FREE 10 ML: 5 INJECTION INTRAVENOUS at 08:30

## 2021-11-05 RX ADMIN — AMLODIPINE BESYLATE 5 MG: 5 TABLET ORAL at 08:30

## 2021-11-05 RX ADMIN — DEXAMETHASONE SODIUM PHOSPHATE 4 MG: 4 INJECTION, SOLUTION INTRAMUSCULAR; INTRAVENOUS at 06:26

## 2021-11-05 RX ADMIN — INSULIN LISPRO 3 UNITS: 100 INJECTION, SOLUTION INTRAVENOUS; SUBCUTANEOUS at 08:29

## 2021-11-05 RX ADMIN — Medication 1 G: at 11:38

## 2021-11-05 RX ADMIN — Medication 1 G: at 08:30

## 2021-11-05 RX ADMIN — FAMOTIDINE 20 MG: 20 TABLET, FILM COATED ORAL at 08:30

## 2021-11-05 RX ADMIN — SODIUM CHLORIDE, PRESERVATIVE FREE 10 ML: 5 INJECTION INTRAVENOUS at 08:29

## 2021-11-05 RX ADMIN — METOPROLOL SUCCINATE 25 MG: 25 TABLET, EXTENDED RELEASE ORAL at 11:38

## 2021-11-05 RX ADMIN — INSULIN LISPRO 2 UNITS: 100 INJECTION, SOLUTION INTRAVENOUS; SUBCUTANEOUS at 11:37

## 2021-11-05 RX ADMIN — OXYCODONE HYDROCHLORIDE 5 MG: 5 TABLET ORAL at 08:30

## 2021-11-05 RX ADMIN — ENALAPRILAT 1.25 MG: 2.5 INJECTION INTRAVENOUS at 09:30

## 2021-11-05 RX ADMIN — DEXAMETHASONE 4 MG: 4 TABLET ORAL at 11:38

## 2021-11-05 RX ADMIN — LEVETIRACETAM 500 MG: 500 TABLET ORAL at 08:30

## 2021-11-05 RX ADMIN — INFLUENZA VIRUS VACCINE 0.5 ML: 15; 15; 15; 15 SUSPENSION INTRAMUSCULAR at 13:13

## 2021-11-05 NOTE — DISCHARGE SUMMARY
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    Secondary diagnosis: Hyponatremia    Consultants: Dr. Duval ICU intensivist, [place] [facility]ist    Procedure craniotomy for resection of left frontotemporal meningioma on November 2    Hospital course: This pleasant 41-year-old man had memory problems problems reading writing texting word finding difficulty over several weeks.  His MRI showed a very large left temporoparietal meningioma causing severe mass-effect and severe edema.  He was placed on anticonvulsants and steroids in preparation for surgery.  He was brought to the operating room on the above date for surgery.  It was felt the gross total resection was achieved.  Frozen section returned fibrous meningioma without malignant features.  Postop he improved neurologically and by postop day 1 was awake alert following commands without any severe aphasia.  He has done very well but has hyponatremia, likely secondary to slight volume overload.  This appears to be improving with additional salt and mild fluid restriction.  He and his wife are comfortable with discharge home.  He has been given instructions and restrictions which he understands.  He is going to mobilize but avoid strenuous activity.  He is going to continue to increase salt intake and limit fluids.  He is going to gradually taper steroids.  He is going to follow-up in about 2 weeks and call with any questions or problems.

## 2021-11-05 NOTE — PROGRESS NOTES
"    [facility] Medicine Services Daily Progress Note    Patient Name: Yoan Salas  : 1980  MRN: [id number]  Primary Care Physician:  Christa Padron PA  Date of admission: 2021      Subjective      Chief Complaint: Headache    Seen and examined bedside.  He feels \"fine\".  Wondering when the surgical drain can come out.  His sodium on repeat again is 129.  He has no headache no nausea no vomiting no focal aches no focal numbness no slurry speech.  No weakness no dizziness.  Will a bit tachycardic and hypertensive    Review of systems  Neuro no focal aches no focal numbness  Cardiac no chest pain or palpitations  GI no nausea no vomiting      Objective      Vitals:   Temp:  [98 °F (36.7 °C)-98.2 °F (36.8 °C)] 98.2 °F (36.8 °C)  Heart Rate:  [] 107  Resp:  [19] 19  BP: (130-177)/() 152/111    Physical Exam   Constitutional:  oriented to person, place, and time. No distress.   HENT:   Head: Normocephalic and atraumatic.  KVNG drain and surgical site  Eyes: Conjunctivae and EOM are normal. Pupils are equal, round, and reactive to light.   Neck: No JVD present. No thyromegaly present.   Cardiovascular: Normal rate, regular rhythm, normal heart sounds and intact distal pulses. Exam reveals no gallop and no friction rub.   No murmur heard.  Pulmonary/Chest: Effort normal and breath sounds normal. No stridor. No respiratory distress.  has no wheezes.  has no rales.  exhibits no tenderness.   Abdominal: Soft. Bowel sounds are normal.  no distension and no mass. There is no tenderness. There is no rebound and no guarding. No hernia.   Musculoskeletal: Normal range of motion.   Lymphadenopathy:     no cervical adenopathy.   Neurological:  alert and oriented to person, place, and time. No cranial nerve deficit or sensory deficit. exhibits normal muscle tone.   Skin: No rash noted.  not diaphoretic.   Psychiatric:  normal mood and affect.   Vitals reviewed.         Result Review  "   Result Review:  I have personally reviewed the results from the time of this admission to 11/5/2021 11:19 EDT and agree with these findings:  [x]  Laboratory  []  Microbiology  [x]  Radiology  []  EKG/Telemetry   []  Cardiology/Vascular   []  Pathology  []  Old records  []  Other:    Wounds (last 24 hours)     LDA Wound     Row Name 11/05/21 0830 11/05/21 0400 11/05/21 0000       Wound 11/02/21 0915 Left parietal region Incision    Wound - Properties Group Placement Date: 11/02/21  -PH Placement Time: 0915  -PH Present on Hospital Admission: N  -PH Side: Left  -PH Location: parietal region  -PH Primary Wound Type: Incision  -PH    Dressing Appearance open to air  -ER -- --    Closure Sutures  -ER Sutures  -MC Sutures  -MC    Base clean; dry  -ER clean; dry  -MC clean; dry  -MC    Retired Wound - Properties Group Date first assessed: 11/02/21  -PH Time first assessed: 0915  -PH Present on Hospital Admission: N  -PH Side: Left  -PH Location: parietal region  -PH Primary Wound Type: Incision  -PH    Row Name 11/04/21 2000 11/04/21 1600 11/04/21 1200       Wound 11/02/21 0915 Left parietal region Incision    Wound - Properties Group Placement Date: 11/02/21  -PH Placement Time: 0915  -PH Present on Hospital Admission: N  -PH Side: Left  -PH Location: parietal region  -PH Primary Wound Type: Incision  -PH    Dressing Appearance -- dry; intact  -ER dry; intact  -ER    Closure Sutures  -MC Sutures  -ER Sutures  -ER    Base clean; dry  -MC clean; dry  -ER clean; dry  -ER    Retired Wound - Properties Group Date first assessed: 11/02/21  -PH Time first assessed: 0915  -PH Present on Hospital Admission: N  -PH Side: Left  -PH Location: parietal region  -PH Primary Wound Type: Incision  -PH          User Key  (r) = Recorded By, (t) = Taken By, (c) = Cosigned By    Initials Name Provider Type    Natalie Goodson RN Registered Nurse    Amada Fierro RN Registered Nurse    Ness Child RN Registered Nurse                   Assessment/Plan      Brief Patient Summary:  Yoan Salas is a 41 y.o. male who admitted to the hospital for neurosurgery for elective craniotomy due to all brain mass      amLODIPine, 5 mg, Oral, Daily  dexamethasone, 4 mg, Oral, Q6H   Or  dexamethasone, 4 mg, Intravenous, Q6H  famotidine, 20 mg, Oral, BID AC  fluconazole, 200 mg, Oral, Q24H  insulin lispro, 0-7 Units, Subcutaneous, 4x Daily With Meals & Nightly  levETIRAcetam, 500 mg, Oral, Q12H  metoprolol succinate XL, 25 mg, Oral, Q24H  sodium chloride, 10 mL, Intravenous, Q12H  sodium chloride, 10 mL, Intravenous, Q12H  sodium chloride, 1 g, Oral, TID With Meals  sucralfate, 1 g, Oral, 4x Daily AC & at Bedtime             Active Hospital Problems:  Active Hospital Problems    Diagnosis    • **Mass of temporoparietal region of brain    • GERD (gastroesophageal reflux disease)    • Class 3 severe obesity with body mass index (BMI) of 40.0 to 44.9 in adult    • Mild confusion, with speech issues and memory issues, related to brain mass    • Hyperglycemia    • Essential hypertension    • Oral thrush      Plan:   Temporoparietal brain mass  Status post craniotomy tumor resection  Pathology pending suspected meningioma  Continue steroids  Continue seizure prophylaxis with Keppra     Hyponatremia  Actually very close to being isotonic serum osmolality is 276.  This would suggest more of a pseudohyponatremia however his blood sugar is only slightly elevated and triglycerides are 231, albumin is within normal limits.  We will check BNP as he does have some lower extremity edema to see if this is a hypervolemic situation  Continue with salt tabs and water restriction    Tachycardia  Appears sinus on monitor   Check bnp  Check 2decho  Check ekg  Add bb    Abnormal thyroid studies  Low TSH with low thyroid hormones would suggest central hypothyroidism?  We will ask endocrinology to evaluate    Hypertension  Continue  amlodipine  Metoprolol     Hyperglycemia  On systemic steroids  Continue sliding scale     GERD  Continue PPI and Carafate     DVT PUD prophylaxis    DVT prophylaxis:  Mechanical DVT prophylaxis orders are present.    CODE STATUS:    Level Of Support Discussed With: Patient  Code Status (Patient has no pulse and is not breathing): CPR (Attempt to Resuscitate)  Medical Interventions (Patient has pulse or is breathing): Full Support      Disposition:  I expect patient to be discharged 24 to 48 hours.    Electronically signed by Jesika Hearn MD, 11/05/21, 11:19 EDT.  [facility] Hospitalist Team

## 2021-11-05 NOTE — THERAPY TREATMENT NOTE
Acute Care - Speech Language Pathology Treatment Note   Phil     Patient Name: Yoan Salas  : 1980  MRN: [id number]  Today's Date: 2021               Admit Date: 2021     Visit Dx:    ICD-10-CM ICD-9-CM   1. Brain tumor (HCC)  D49.6 239.6     Patient Active Problem List   Diagnosis   • Leukemia in remission, childhood; S/P Radiation   • Class 3 severe obesity with body mass index (BMI) of 40.0 to 44.9 in adult   • Hyperglycemia   • GERD (gastroesophageal reflux disease)   • Essential hypertension   • Oral thrush     Past Medical History:   Diagnosis Date   • Allergic    • Anxiety    • Asthma    • Cancer (HCC)     ALL as child    • Class 3 severe obesity with body mass index (BMI) of 40.0 to 44.9 in adult 2021   • Depression    • GERD (gastroesophageal reflux disease)    • Hypertension    • Leukemia in remission, childhood; S/P Radiation      Past Surgical History:   Procedure Laterality Date   • CRANIOTOMY FOR TUMOR Left 2021    Procedure: CRANIOTOMY FOR RESECTION OF LEFT PARIETAL TUMOR;  Surgeon: Francois Jean MD;  Location: [facility];  Service: Neurosurgery;  Laterality: Left;   • HERNIA REPAIR      6 MONTHS OLD   • PORTACATH PLACEMENT      CHILDHOOD       SLP Recommendation and Plan  SLP Diagnosis: Pt demonstrated improved cognition on dealyed recall, immediate memory and simple mental math caculations today. (21 1200)        [facility] Criteria for Skilled Therapy Interventions Met: yes (21 1200)           Predicted Duration Therapy Intervention (Days): until discharge (21 1200)       Patient was not wearing a face mask during this therapy encounter. Therapist used appropriate personal protective equipment including mask, eye protection and gloves.  Mask used was standard procedure mask. Appropriate PPE was worn during the entire therapy session. Hand hygiene was completed before and after therapy session. Patient is not in enhanced droplet precautions.  "                        SLP EVALUATION (last 72 hours)     SLP SLC Evaluation     Row Name 11/05/21 1200          Document Type therapy note (daily note)  -    Subjective Information no complaints  -    Patient Observations alert; cooperative; agree to therapy  -    Patient/Family/Caregiver Comments/Observations Pts wife was present during session  -    Patient Effort good  -    Comment Pt demonstrated improvement on cognitive tasks including divergent naming, immediate memory, and simple mental math caculations. Pt demonstrated paraphasias in structured tasks and spontaneous conversation (more noticable in structured tasks than spontaneous conversation) with evident self corrections. Pt reports that his speech is \"coming out more easily today\", however he does expereince word finding difficulties at times. Pt educated on circumloction as a strategy to utilize when he encouters word finding difficulties in conversation. ST to continue to follow to work on cognitive and expressive/receptive language tasks.   -               Patient Profile Reviewed yes  -    Pertinent History Of Current Problem Pleasant 41-year-old man has had some word finding difficulty over the past several months.  Its become much more severe over the past few months.  He is also had some difficulty reading and some mild memory problems.  His wife says that sometimes he gets the wrong word and also gives the example that he is confused there male and female pet cats.  Initially, he attributed this to anxiety and he started on Prozac.  Now that he has a diagnosis, he says he is really not depressed and he is working with his family doctor to wean that medication.  He had childhood leukemia and was treated with some experimental form of radiation which did apparently involve his head area.  He has mild gastroesophageal reflux disease.  No chest pain or shortness of breath.  Only mild occasional headaches.  No visual problems.  No " numbness or weakness or incoordination or balance problems.  Does not take any blood thinners.  Non-smoker. 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                Additional Documentation Pain Scale: FACES Pre/Post-Treatment (Group)  -               Pretreatment Pain Rating 0/10 - no pain  -    Posttreatment Pain Rating 0/10 - no pain  -               Cognitive Function (Cognition) --    Orientation Status (Cognition) --    Memory (Cognitive) --    Attention (Cognitive) --    Thought Organization (Cognitive) --    Reasoning (Cognitive) --    Problem Solving (Cognitive) --    Functional Math (Cognitive) --    Executive Function (Cognition) --    Cognition, Comment --               SLP Diagnosis Pt demonstrated improved cognition on dealyed recall, immediate memory and simple mental math caculations today.  -    Rehab Potential/Prognosis good  -    SLC Criteria for Skilled Therapy Interventions Met yes  -    Functional Impact functional impact in ADLs; unable to make medical decisions; difficulty communicating wants, needs; decreased ability to respond to situations safely  -               Therapy Frequency (SLP SLC) PRN  -    Predicted Duration Therapy Intervention (Days) until discharge  -               Auditory Comprehension Treatment Objectives Auditory Comprehension Treatment Objectives (Group)  -    Verbal Expression Treatment Objectives Verbal Expression Treatment Objectives (Group)  -    Cognitive Linguistic Treatment Objectives Cognitive Linguistic Treatment Objectives (Group)  -               Comprehend Questions Selection comprehend questions, SLP goal 1  -    Follow Directions Selection follow directions, SLP goal 1  -               Improve Ability to Comprehend Questions Goal 1 (SLP) questions about personal information; simple yes/no questions; yes/no questions about short paragraph; 90%; with minimal cues (75-90%)  -     Time Frame (Comprehend Questions Goal 1, SLP) short term goal (STG)  -    Comment (Comprehend Questions Goal 1, SLP) Pt was able to answer personal yes/no questions with 100% no cues. Pt demonstrated paraphasias during tasks, however, he was able to self correct with 80% accuracy during tasks.  -               Improve Ability to Follow Directions Goal 1 (SLP) 1 step direction without objects; 2 step commands; 90%; independently (over 90% accuracy)  -    Time Frame (Follow Directions Goal 1, SLP) short term goal (STG)  -    Comment (Follow Directions Goal 1, SLP) Did not address on this date  -               Word Retrieval Skills Selection word retrieval, SLP goal 1  -               Improve Word Retrieval Skills By Goal 1 (SLP) completing automatic speech task, counting; completing automatic speech task, days of the week; completing automatic speech task, months; confrontational naming task; responsive naming task; repeating phrases; repeating sentences; answer WH question with one word; completing a divergent task; 90%; with minimal cues (75-90%)  -    Time Frame (Word Retrieval Goal 1, SLP) short term goal (STG)  -    Barriers (Word Retrieval Goal 1, SLP) --    Comment (Word Retrieval Goal 1, SLP) Did not address on this date  -               Awareness of Basic Personal Information Selection awareness of basic personal information, SLP goal 1  -    Attention Selection attention, SLP goal 1  -    Orientation Selection orientation, SLP goal 1  -    Memory Skills Selection memory skills, SLP goal 1  -    Organizational Skills Selection organizational skills, SLP goal 1  -    Functional Math Skills Selection functional math skills, SLP goal 1  -    Executive Function Skills Selection executive function skills, SLP goal 1  -               Improve Awareness of Basic Personal Information Goal 1 (SLP) answering yes/no questions regarding personal/biographical information; naming self, family  members; answering open-ended questions regarding personal/biographical information; 90%; with minimal cues (75-90%)  -    Time Frame (Awareness of Basic Personal Information Goal 1, SLP) short term goal (STG)  -AH    Barriers (Awareness of Basic Personal Information Goal 1, SLP) Pt was orientated to self, time, place, and impairment with 100% no cues.  -               Improve Attention by Goal 1 (SLP) complete sustained attention task; 90%; with minimal cues (75-90%)  -    Time Frame (Attention Goal 1, SLP) short term goal (STG)  -    Barriers (Attention Goal 1, SLP) --    Comment (Attention Goal 1, SLP) Did not assess on this date  -               Improve Orientation Through Goal 1 ([facility]) demonstrating orientation to day; demonstrating orientation to month; demonstrating orientation to year; demonstrating orientation to place; use environmental aids to assist with orientation; 90%; with minimal cues (75-90%)  -    Time Frame (Orientation Goal 1, SLP) short term goal (STG)  -    Comment (Orientation Goal 1, SLP) Pt was orientated to self, time, place, and impairment with 100% no cues.  -               Improve Memory Skills Through Goal 1 (SLP) visual memory task; listen to a paragraph and answer questions; read a paragraph and answer questions; use memory strategies; recall details of the day; select a word from a list by exclusion; 80%; 90%; with minimal cues (75-90%)  -    Time Frame (Memory Skills Goal 1, SLP) short term goal (STG)  -    Comment (Memory Skills Goal 1, SLP) Pt listened to story and answering corresponding questions with 36% no cues, 64% min cues, 71% mod cues, max cues did not increase acc. Pt demonstrated paraphasias during task, however was able to self correct with 80% acc independently.  -               Improve Thought Organization Through Goal 1 (SLP) completing a divergent naming task; generating a list of items in a category; naming by category exclusion; completing  mental manipulation task; concrete; 90%; with minimal cues (75-90%)  -    Time Frame (Thought Organization Skills Goal 1, SLP) short term goal (STG)  -    Comment (Thought Organization Skills Goal 1, SLP) Trial 1: 6 items no cues, 11 items min cues; trial 2: 3 items no cues, 6 items min cues, 8 items mod cues; trial 3: 8 items no cues, 9 items min cues, 10 items mod cues.  -               Improve Functional Math Skills Through Goal 1 (SLP) complete simple math problems; complete functional math task; complete word problems involving time; complete word problems involving money; complete checkbook task; 80%; with moderate cues (50-74%)  -    Time Frame (Functional Math Skills Goal 1, SLP) by discharge  -    Barriers (Functional Math Skills Goal 1, SLP) --    Comment (Functional Math Skills Goal 1, SLP) Pt completed simple addition and subtraction mental math manipulations with 100% acc with repetition and use of visual cues  -               Improve Executive Function Skills Goal 1 (SLP) organization/planning activity; 80%; with minimal cues (75-90%)  -    Time Frame (Executive Function Skills Goal 1, SLP) short term goal (STG)  -    Comment (Executive Function Skills Goal 1, SLP) Did not address on this date  -          User Key  (r) = Recorded By, (t) = Taken By, (c) = Cosigned By    Initials Name Effective Dates     Kayla Garcia, [facility] 09/30/21 -      Emilee Mcneill, SLP 09/21/21 -                    EDUCATION  The patient has been educated in the following areas:     Cognitive Impairment Communication Impairment.           SLP GOALS     Row Name 11/05/21 1200 11/04/21 1000          Comprehend Questions Goal 1 (SLP)    Improve Ability to Comprehend Questions Goal 1 (SLP) questions about personal information; simple yes/no questions; yes/no questions about short paragraph; 90%; with minimal cues (75-90%)  - questions about personal information; simple yes/no questions; yes/no questions  about short paragraph; 90%; with minimal cues (75-90%)  -CB     Time Frame (Comprehend Questions Goal 1, SLP) short term goal (STG)  - short term goal (STG)  -CB     Comment (Comprehend Questions Goal 1, SLP) Pt was able to answer personal yes/no questions with 100% no cues. Pt demonstrated paraphasias during tasks, however, he was able to self correct with 80% accuracy during tasks.  - --            Follow Directions Goal 2 (SLP)    Improve Ability to Follow Directions Goal 1 (SLP) 1 step direction without objects; 2 step commands; 90%; independently (over 90% accuracy)  - 1 step direction without objects; 2 step commands; 90%; independently (over 90% accuracy)  -CB     Time Frame (Follow Directions Goal 1, SLP) short term goal (STG)  - --     Comment (Follow Directions Goal 1, [facility]) Did not address on this date  - --            Word Retrieval Skills Goal 1 (SLP)    Improve Word Retrieval Skills By Goal 1 (SLP) completing automatic speech task, counting; completing automatic speech task, days of the week; completing automatic speech task, months; confrontational naming task; responsive naming task; repeating phrases; repeating sentences; answer WH question with one word; completing a divergent task; 90%; with minimal cues (75-90%)  - completing automatic speech task, counting; completing automatic speech task, days of the week; completing automatic speech task, months; confrontational naming task; responsive naming task; repeating phrases; repeating sentences; answer WH question with one word; completing a divergent task; 90%; with minimal cues (75-90%)  -CB     Time Frame (Word Retrieval Goal 1, SLP) short term goal (STG)  - --     Barriers (Word Retrieval Goal 1, SLP) -- Patient requires prompting at times.  -CB     Comment (Word Retrieval Goal 1, SLP) Did not address on this date  - --            Awareness of Basic Personal Information Goal 1 (SLP)    Improve Awareness of Basic Personal  Information Goal 1 (SLP) answering yes/no questions regarding personal/biographical information; naming self, family members; answering open-ended questions regarding personal/biographical information; 90%; with minimal cues (75-90%)  - answering yes/no questions regarding personal/biographical information; naming self, family members; answering open-ended questions regarding personal/biographical information; 90%; with minimal cues (75-90%)  -CB     Time Frame (Awareness of Basic Personal Information Goal 1, SLP) short term goal (STG)  - short term goal (STG)  -CB     Barriers (Awareness of Basic Personal Information Goal 1, SLP) Pt was orientated to self, time, place, and impairment with 100% no cues.  - Patient is inconsistent with skills.  -CB            Attention Goal 1 (SLP)    Improve Attention by Goal 1 (SLP) complete sustained attention task; 90%; with minimal cues (75-90%)  - complete sustained attention task; 90%; with minimal cues (75-90%)  -CB     Time Frame (Attention Goal 1, SLP) short term goal (STG)  - short term goal (STG)  -CB     Barriers (Attention Goal 1, SLP) -- Patient exhibits decrease processing.  -CB     Comment (Attention Goal 1, SLP) Did not assess on this date  - --            Orientation Goal 1 (SLP)    Improve Orientation Through Goal 1 ([facility]) demonstrating orientation to day; demonstrating orientation to month; demonstrating orientation to year; demonstrating orientation to place; use environmental aids to assist with orientation; 90%; with minimal cues (75-90%)  - demonstrating orientation to day; demonstrating orientation to month; demonstrating orientation to year; demonstrating orientation to place; use environmental aids to assist with orientation; 90%; with minimal cues (75-90%)  -CB     Time Frame (Orientation Goal 1, SLP) short term goal (STG)  - short term goal (STG)  -CB     Comment (Orientation Goal 1, [facility]) Pt was orientated to self, time, place, and  impairment with 100% no cues.  - --            Memory Skills Goal 1 (SLP)    Improve Memory Skills Through Goal 1 (SLP) visual memory task; listen to a paragraph and answer questions; read a paragraph and answer questions; use memory strategies; recall details of the day; select a word from a list by exclusion; 80%; 90%; with minimal cues (75-90%)  - visual memory task; listen to a paragraph and answer questions; read a paragraph and answer questions; use memory strategies; recall details of the day; select a word from a list by exclusion; 80%; 90%; with minimal cues (75-90%)  -CB     Time Frame (Memory Skills Goal 1, SLP) short term goal (STG)  - short term goal (STG)  -CB     Comment (Memory Skills Goal 1, SLP) Pt listened to story and answering corresponding questions with 36% no cues, 64% min cues, 71% mod cues, max cues did not increase acc. Pt demonstrated paraphasias during task, however was able to self correct with 80% acc independently.  - --            Organizational Skills Goal 1 (SLP)    Improve Thought Organization Through Goal 1 (SLP) completing a divergent naming task; generating a list of items in a category; naming by category exclusion; completing mental manipulation task; concrete; 90%; with minimal cues (75-90%)  - completing a divergent naming task; generating a list of items in a category; naming by category exclusion; completing mental manipulation task; concrete; 90%; with minimal cues (75-90%)  -CB     Time Frame (Thought Organization Skills Goal 1, SLP) short term goal (STG)  - short term goal (STG)  -CB     Comment (Thought Organization Skills Goal 1, SLP) Trial 1: 6 items no cues, 11 items min cues; trial 2: 3 items no cues, 6 items min cues, 8 items mod cues; trial 3: 8 items no cues, 9 items min cues, 10 items mod cues.  - --            Functional Math Skills Goal 1 (SLP)    Improve Functional Math Skills Through Goal 1 (SLP) complete simple math problems; complete  functional math task; complete word problems involving time; complete word problems involving money; complete checkbook task; 80%; with moderate cues (50-74%)  - complete simple math problems; complete functional math task; complete word problems involving time; complete word problems involving money; complete checkbook task; 80%; with moderate cues (50-74%)  -CB     Time Frame (Functional Math Skills Goal 1, SLP) by discharge  - --     Barriers (Functional Math Skills Goal 1, SLP) -- Patient has decrease processing.  -CB     Comment (Functional Math Skills Goal 1, SLP) Pt completed simple addition and subtraction mental math manipulations with 100% acc with repetition and use of visual cues  - --            Executive Functional Skills Goal 1 (SLP)    Improve Executive Function Skills Goal 1 (SLP) organization/planning activity; 80%; with minimal cues (75-90%)  - organization/planning activity; 80%; with minimal cues (75-90%)  -CB     Time Frame (Executive Function Skills Goal 1, SLP) short term goal (STG)  - short term goal (STG)  -CB     Comment (Executive Function Skills Goal 1, SLP) Did not address on this date  - --           User Key  (r) = Recorded By, (t) = Taken By, (c) = Cosigned By    Initials Name Provider Type     Kayla Garcia SLP Speech and Language Pathologist    Emilee Carolina SLP Speech and Language Pathologist                        Time Calculation:                        DEANDRE Felix  11/5/2021

## 2021-11-05 NOTE — THERAPY TREATMENT NOTE
Subjective: Pt agreeable to therapeutic plan of care.    Objective:     Pt completes sit to stand transfer with SBA and stand pivot transfer with SBA.  Pt amb [id number] with no ad and SBA.    Pain: 0 VAS  Education: Provided education on importance of mobility and skilled verbal / tactile cueing throughout intervention.     Assessment: Yoan Salas presents with functional mobility impairments which indicate the need for skilled intervention. Pt presents to therapy this date up in bedisde chair and is ready to amb this date. Pt showing overall physical and mental improvement this date with increase in amb distance and is A&O x4. Pt continues to make progress towards all goals for d/c home with home health. Tolerating session today without incident. Will continue to follow and progress as tolerated.     Plan/Recommendations:   Pt would benefit from Home with Home Health at discharge from facility and requires no DME at discharge.   Pt desires Home with Home Health at discharge. Pt cooperative; agreeable to therapeutic recommendations and plan of care.     Basic Mobility 6-click:  Rollin = Total, A lot = 2, A little = 3; 4 = None  Supine>Sit:   1 = Total, A lot = 2, A little = 3; 4 = None   Sit>Stand with arms:  1 = Total, A lot = 2, A little = 3; 4 = None  Bed>Chair:   1 = Total, A lot = 2, A little = 3; 4 = None  Ambulate in room:  1 = Total, A lot = 2, A little = 3; 4 = None  3-5 Steps with railin = Total, A lot = 2, A little = 3; 4 = None  Score: 23    Modified Artur: N/A = No pre-op stroke/TIA    Post-Tx Position: Up in Chair with call light in reach.  PPE: gloves, surgical mask, eyewear protection

## 2021-11-05 NOTE — PROGRESS NOTES
Spoke with pts wife(patient was in therapy). Verified demographics and explained services. Pt is agreeable to services.     Pharmacy: [facility]

## 2021-11-05 NOTE — THERAPY TREATMENT NOTE
Subjective: Pt agreeable to therapeutic plan of care. Wife present. Denies pain. Pt with hemovac with brain to L side of head. S/p L crani.     Cognition: A&Ox4    Objective:   Functional Transfers: CGA  Functional Ambulation: CGA and Min-A with LE coordination tasks for balance and safety.     Neuro Assessment: positive findings: muscular weakness in L UE, abnormality of coordination on L side.    Language Assessment: communicative, spoke clearly in sentences and eye contact integrated approriately with other forms of communication    Visual Findings: difficulty tracking to L and R during reading    Tone Assessment: intact/ normal    Education: Provided education on importance of mobility and skilled verbal / tactile cueing throughout intervention.     Assessment: Pt tolerated OT tx well this date, very motivated. Pt with max difficulty tracking during reading, educated on using finger to assist with tracking and paper underneath each line to assist with visual search and visual ground. Pt completed LE coordination including side stepping, toe walking. R UE coordination including FMC tasks including translation, 2pt pinch with mod difficulty, 3 pt pinch with mod difficulty, opposition with fair coordination. Pt req visual and verbal cues with demonstration for all instruction. Pt shows good progress toward goals. Pt provided with handout of UE/LE coordination tasks for home and BUE therex HEP. REC home with OP OT and wife assist.    Plan/Recommendations:   Pt would benefit from Home with family assist and Outpatient therapy at discharge from facility.       Modified [place]: 4 = Moderately severe disability (Unable to attend to own bodily needs without assistance, and unable to walk unassisted)     Post-Tx Position: Up in Chair, Alarms activated and Call light and personal items within reach

## 2021-11-05 NOTE — PLAN OF CARE
Goal Outcome Evaluation:         Assessment: Yoan Salas presents with functional mobility impairments which indicate the need for skilled intervention. Pt presents to therapy this date up in bedisde chair and is ready to amb this date. Pt showing overall physical and mental improvement this date with increase in amb distance and is A&O x4. Pt continues to make progress towards all goals for d/c home with home health. Tolerating session today without incident. Will continue to follow and progress as tolerated.

## 2021-11-05 NOTE — PROGRESS NOTES
PULMONARY/CRITICAL CARE PROGRESS NOTE       NAME:     Yoan Salas   AGE:     41 y.o.   SEX:  male   : 1980       MRN:       [id number]          RM: [facility]      ASSESSMENT     F/U: S/p left temporoparietal craniotomy    Principal Problem:    Mass of temporoparietal region of brain  Active Problems:    Mild confusion, with speech issues and memory issues, related to brain mass    Hyperglycemia    GERD (gastroesophageal reflux disease)    Essential hypertension    Oral thrush    Class 3 severe obesity with body mass index (BMI) of 40.0 to 44.9 in adult       MULTIDISCIPLINARY ROUNDING     :  • Drain removed by primary team.  • Ambulating well without difficulty.  • Continues as an overflow in ICU, hospitalist managing.  • Issues with patient's discharge, patient's scripts sent to [facility], and Med to Beds was requested, resubmitted to [facility].  • Patient approved for discharge home per neurosurgery.  • Stable from pulmonary standpoint for discharge at this time.    PLAN     Fibroblastic meningioma  Mild confusion, with speech issues and memory issues, related to brain mass  -Underwent pre-hospital work-up, presented for planned craniotomy with tumor resection of 4.9 cm temporoparietal mass  -Analgesia per neurosurgery recommendations.  -Neuro assessment per neurosurgery recommendations.  -Pathology consistent with fibroblastic meningioma.     Hyperglycemia  -Likely secondary to steroid use  -Hemoglobin A1c-6.2 on 11/3/2021  -strict glycemic control recommended  -Accuchecks AC/HS or Q6H with sliding scale insulin, based on diet     Essential hypertension  -Closely monitor patient's blood pressure, resume home medication regimen when appropriate  -Home regimen includes: Norvasc  -Norvasc 5 mg restarted 2021     Oral thrush  -Diflucan ordered per Neurosurgery  -Consider switching to Nystatin     GERD (gastroesophageal reflux disease)  -Continue PPI, Carafate     Class 3 severe  obesity with body mass index (BMI) of 40.0 to 44.9 in adult  -Complicating aspects of care  - on lifestyle modifications to improve overall health     Code Status: CPR, Full Support  VTE Prophylaxis: SCDs  PUD Prophylaxis: Pepcid      [place] & SUBJECTIVE     Yoan Salas is a 41 y.o. male  has a past medical history of Allergic, Anxiety, Asthma, Cancer (HCC), Depression, GERD (gastroesophageal reflux disease), and Hypertension.   Patient presented to [facility] for planned elective craniotomy on 11/2/2021 by Dr. Jean. Preoperative work-up revealed that patient had some word finding difficulty over the past several months that had became more severe. He also had admitted to some difficulty reading and some mild memory problems. Per patient's wife, he reportedly sometimes gets the wrong word and also gives the example that he is confused that there are both male and female pet cats. Patient attributed this to anxiety initially and was started on Prozac. Now that he was found with the diagnosis of meningioma, he states that he does not feel like he is depressed and is working with his family doctor to wean that medication. 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 Patient is with known mild reflux. He denied chest pain, shortness of breath but did admit to some mild occasional headaches. He denied any visual problems, numbness, weakness, incoordination or balance problems. Patient does not take any blood thinners, and is a non-smoker. Preoperative evaluation revealed an MRI of the brain with and without contrast that revealed a 4.9 cm dural based mass in his left parietal area with significant mass-effect and edema. It was recommended for patient to undergo craniotomy with tumor resection.       Patient on 11/2 underwent left temporoparietal craniotomy for resection of large meningioma by Dr. Jean with  noted approximate blood loss of 1200 mL, with pathology pending. Patient had one subgaleal Hemovac placed. Patient was successfully extubated in PACU, and Pulmonary/Intensivist consultation was requested for frequent neurologic monitoring while in ICU. Patient is currently recently postoperative, confused, moving all extremities but having difficulty with following commands at this time. Patient does know his name, but is answering yes and no questions variably, but feel this is most likely medication related versus an actual acute neurologic change, as these are the same behaviors and answers that patient was reporting while in PACU. We will closely monitor, neurosurgeon is aware.       DAILY UPDATES:  11/3: Patient resting in bed. Reports minimal pain at incisional site.  His pain a 4 out of 10 well controlled with medication.  Patient denies any nausea or vomiting, dizziness, shortness of breath, or abdominal pain.  Patient has no other complaints at this time.  11/4: Denies chest pain, nausea, vomiting, minimal incisional pain.  11/5: Ambulating well, denies pain, nausea, vomiting, minimal incisional pain.    REVIEW OF SYSTEMS:  Pertinent items are noted in HPI, all other systems reviewed and negative      MEDICATIONS     SCHEDULED MEDICATIONS:   amLODIPine, 5 mg, Oral, Daily  dexamethasone, 4 mg, Oral, Q6H   Or  dexamethasone, 4 mg, Intravenous, Q6H  famotidine, 20 mg, Oral, BID AC  fluconazole, 200 mg, Oral, Q24H  insulin lispro, 0-7 Units, Subcutaneous, 4x Daily With Meals & Nightly  levETIRAcetam, 500 mg, Oral, Q12H  metoprolol succinate XL, 25 mg, Oral, Q24H  sodium chloride, 10 mL, Intravenous, Q12H  sodium chloride, 10 mL, Intravenous, Q12H  sodium chloride, 1 g, Oral, TID With Meals  sucralfate, 1 g, Oral, 4x Daily AC & at Bedtime        CONTINUOUS INFUSIONS:        PRN MEDS:  •  acetaminophen **OR** acetaminophen  •  albuterol sulfate HFA  •  aluminum-magnesium hydroxide-simethicone  •  dextrose  •   "dextrose  •  enalaprilat  •  glucagon (human recombinant)  •  insulin lispro **AND** insulin lispro  •  magnesium sulfate **OR** magnesium sulfate in D5W 1g/100mL (PREMIX)  •  [DISCONTINUED] Morphine **AND** naloxone  •  nitroglycerin  •  ondansetron **OR** ondansetron  •  oxyCODONE  •  potassium chloride **OR** potassium chloride **OR** potassium chloride  •  potassium phosphate infusion greater than 15 mMoles **OR** potassium phosphate infusion greater than 15 mMoles **OR** potassium phosphate **OR** sodium phosphate IVPB **OR** sodium phosphate IVPB **OR** sodium phosphate IVPB  •  promethazine  •  sodium chloride  •  sodium chloride    OBJECTIVE     VITAL SIGNS:  BP (!) 152/111   Pulse 107   Temp 98.2 °F (36.8 °C) (Oral)   Resp 19   Ht 167.6 cm (65.98\")   Wt 130 kg (285 lb 15 oz)   SpO2 98%   BMI 46.17 kg/m²     I/O FROM PREVIOUS 3 SHIFTS:  I/O last 3 completed shifts:  In: 4079 [P.O.:1673; I.V.:2406]  Out: 4190 [Urine:4100; Drains:90]    NET BALANCE SINCE ADMISSION:  Net IO Since Admission: 3,610 mL [11/05/21 1117]     I/O PREVIOUS 24 HOURS:   Intake/Output                             11/03/21 0701 - 11/04/21 0700 11/04/21 0701 - 11/05/21 0700 11/05/21 0701 - 11/06/21 0700     6045-6967 1242-3622 Total 4959-2285 5933-0986 Total 0160-3089 2683-8093 Total                    Intake    P.O.  1470  720 2190  480  473 953  240  -- 240    I.V.  --  2406 2406  --  -- --  --  -- --    Total Intake 1470 3126 4596 480 473 953 240 -- 240       Output    Urine  1300  2400 3700  800  900 1700  400  -- 400    Drains  --  70 70  20  -- 20  --  -- --    Total Output 1300 2470 3770  400 -- 400             BOWEL MOVEMENTS:  Stool Output  Stool Unmeasured Occurrence: 1 (11/05/21 0600)  Bowel Incontinence: No (11/05/21 0600)  Stool Amount: large (11/05/21 0600)       PHYSICAL EXAM:  General Appearance:  Alert, cooperative, no distress, appears stated age  Head:  Normocephalic, without obvious abnormality, " surgical incision left parietal region-dressing intact  Eyes:  PERRL, conjunctiva/corneas clear, EOM's intact     Neck:  Supple, trachea midline  Lungs:   Clear and equal bilaterally, respirations unlabored without accessory muscle use  Chest wall:  No tenderness  Heart: Denies tachycardia, S1 and S2 normal, no murmur, rub or gallop  Abdomen:  Soft, non-tender, bowel sounds active all four quadrants  Extremities:  Extremities normal, no cyanosis or edema  Pulses: 2+ bilateral radial pulses, 2+ bilateral DP pulses, 2+ bilateral PT pulses  Skin:  No rashes or lesions, surgical incision left parietal region-dressing intact  Neurologic:   Alert and oriented, no focal deficits      RESULTS     LABS:  Lab Results (last 24 hours)     Procedure Component Value Units Date/Time    POC Glucose Once [[id number]]  (Abnormal) Collected: 11/05/21 0733    Specimen: Blood Updated: 11/05/21 0734     Glucose 211 mg/dL      Comment: Serial Number: [id number]:  [id number]       Comprehensive Metabolic Panel [[id number]]  (Abnormal) Collected: 11/05/21 0556    Specimen: Blood Updated: 11/05/21 0641     Glucose 202 mg/dL      BUN 23 mg/dL      Creatinine 0.59 mg/dL      Sodium 129 mmol/L      Potassium 4.9 mmol/L      Chloride 95 mmol/L      CO2 22.0 mmol/L      Calcium 8.2 mg/dL      Total Protein 5.3 g/dL      Albumin 3.50 g/dL      ALT (SGPT) 91 U/L      AST (SGOT) 27 U/L      Alkaline Phosphatase 32 U/L      Total Bilirubin 0.4 mg/dL      eGFR Non African Amer >150 mL/min/1.73      Globulin 1.8 gm/dL      A/G Ratio 1.9 g/dL      BUN/Creatinine Ratio 39.0     Anion Gap 12.0 mmol/L     Narrative:      GFR Normal >60  Chronic Kidney Disease <60  Kidney Failure <15      Phosphorus [[id number]]  (Normal) Collected: 11/05/21 0556    Specimen: Blood Updated: 11/05/21 0641     Phosphorus 3.0 mg/dL     Magnesium [[id number]]  (Normal) Collected: 11/05/21 0556    Specimen: Blood Updated: 11/05/21 0641     Magnesium 2.4 mg/dL     POC Glucose  Once [[id number]]  (Abnormal) Collected: 11/04/21 2118    Specimen: Blood Updated: 11/05/21 0635     Glucose 230 mg/dL      Comment: Serial Number: [id number]:  [id number]       CBC & Differential [[id number]]  (Abnormal) Collected: 11/05/21 0557    Specimen: Blood Updated: 11/05/21 0609    Narrative:      The following orders were created for panel order CBC & Differential.  Procedure                               Abnormality         Status                     ---------                               -----------         ------                     CBC Auto Differential[[id number]]        Abnormal            Final result                 Please view results for these tests on the individual orders.    CBC Auto Differential [[id number]]  (Abnormal) Collected: 11/05/21 0557    Specimen: Blood Updated: 11/05/21 0609     WBC 7.60 10*3/mm3      RBC 3.51 10*6/mm3      Hemoglobin 10.2 g/dL      Hematocrit 29.5 %      MCV 83.9 fL      MCH 29.0 pg      MCHC 34.5 g/dL      RDW 16.2 %      RDW-SD 47.3 fl      MPV 7.0 fL      Platelets 132 10*3/mm3      Neutrophil % 89.2 %      Lymphocyte % 4.5 %      Monocyte % 5.7 %      Eosinophil % 0.1 %      Basophil % 0.5 %      Neutrophils, Absolute 6.80 10*3/mm3      Lymphocytes, Absolute 0.30 10*3/mm3      Monocytes, Absolute 0.40 10*3/mm3      Eosinophils, Absolute 0.00 10*3/mm3      Basophils, Absolute 0.00 10*3/mm3      nRBC 0.0 /100 WBC     T3, Free [[id number]]  (Abnormal) Collected: 11/04/21 1106    Specimen: Blood Updated: 11/04/21 2335     T3, Free 1.76 pg/mL     Narrative:      Results may be falsely increased if patient taking Biotin.      Creatinine, Urine, Random - Urine, Clean Catch [[id number]] Collected: 11/04/21 1651    Specimen: Urine, Clean Catch Updated: 11/04/21 1956     Creatinine, Urine 75.9 mg/dL     Narrative:      Reference intervals for random urine have not been established.  Clinical usage is dependent upon physician's interpretation in combination with other  laboratory tests.       Osmolality, Urine - Urine, Clean Catch [[id number]]  (Abnormal) Collected: 11/04/21 1651    Specimen: Urine, Clean Catch Updated: 11/04/21 1829     Osmolality, Urine 826 mOsm/kg     Sodium, Urine, Random - Urine, Clean Catch [[id number]] Collected: 11/04/21 1651    Specimen: Urine, Clean Catch Updated: 11/04/21 1742     Sodium, Urine 95 mmol/L     Narrative:      Reference intervals for random urine have not been established.  Clinical usage is dependent upon physician's interpretation in combination with other laboratory tests.       POC Glucose Once [[id number]]  (Abnormal) Collected: 11/04/21 1727    Specimen: Blood Updated: 11/04/21 1731     Glucose 143 mg/dL      Comment: Serial Number: [id number]:  [id number]       Uric Acid, Urine, Random - Urine, Clean Catch [[id number]] Collected: 11/04/21 1651    Specimen: Urine, Clean Catch Updated: 11/04/21 1654    Tissue Pathology Exam [[id number]] Collected: 11/02/21 1010    Specimen: Tissue from Head, Tissue from Head Updated: 11/04/21 1449     Case Report --     Surgical Pathology Report                         Case: [id number]                                  Authorizing Provider:  Francois Jean MD  Collected:           11/02/2021 10:10 AM          Ordering Location:     [facility] MAIN  Received:            11/02/2021 10:17 AM                                 OR                                                                           Pathologist:           Mitul Siddiqui MD                                                            Intraop:               Ernesto Lal MD                                                             Specimens:   1) - Head, parietal tumor                                                                           2) - Head, parietal tumor                                                                   Final Diagnosis --     Specimen #1 (Parietal tumor, excision):    Meningioma, fibroblastic  "variant (WHO grade I)    See comment     Specimen #2 (\"Parietal tumor):    Meningioma, fibroblastic variant (WHO grade I)    CHITO/tkd        Comment --     The final diagnosis is in agreement with the rendered frozen section findings        Intraoperative Consultation --     1FA. Parietal tumor, left, biopsy:     DX: Fibroblastic meningioma            No necrosis or mitotic figures identified    Ernesto Lal MD       Gross Description --     Specimen #1 is received labeled \"Parietal tumor.\" Received in the fresh state is a single fragment of reddish pink soft tissue which measures 2.1 cm in greatest dimension. The specimen has a slightly necrotic appearance to it. It is submitted in its entirety for frozen section as FS1.      Part 2: Received in formalin designated \"Parietal tumor\" are several irregular fragments of yellowish tan soft tissue measuring 5.8 x 5.3 x 1.5 cm. Sectioning reveals homogenous yellowish tan cut surfaces. Representative sections are submitted in cassettes A through C.      JPR/CHITO/sms       T4, Free [[id number]]  (Abnormal) Collected: 11/04/21 1106    Specimen: Blood Updated: 11/04/21 1332     Free T4 0.87 ng/dL     Narrative:      Results may be falsely increased if patient taking Biotin.      POC Glucose Once [[id number]]  (Abnormal) Collected: 11/04/21 1242    Specimen: Blood Updated: 11/04/21 1243     Glucose 187 mg/dL      Comment: Serial Number: [id number]:  [id number]       Osmolality, Serum [[id number]]  (Normal) Collected: 11/04/21 1106    Specimen: Blood Updated: 11/04/21 1237     Osmolality 276 mOsm/kg     Cortisol [[id number]] Collected: 11/04/21 1106    Specimen: Blood Updated: 11/04/21 1225     Cortisol 1.38 mcg/dL     Narrative:      Cortisol Reference Ranges:    Cortisol 6AM - 10AM Range: 6.02-18.40 mcg/dl  Cortisol 4PM - 8PM Range: 2.68-10.50 mcg/dl      Results may be falsely increased if patient taking Biotin.      TSH [[id number]]  (Abnormal) Collected: 11/04/21 1106    " Specimen: Blood Updated: 11/04/21 1214     TSH 0.125 uIU/mL     Uric Acid [[id number]]  (Abnormal) Collected: 11/04/21 1106    Specimen: Blood Updated: 11/04/21 1212     Uric Acid 2.6 mg/dL            RADIOLOGY:  MRI Brain With & Without Contrast    Result Date: 11/4/2021  MRI BRAIN W WO CONTRAST Date of Exam: 11/3/2021 14:40 EDT Indication: Brain mass or lesion. Comparison Exams: 10/26/2021 Technique: Routine multiplanar multisequence MR imaging of the brain was performed before and after the administration of 20 cc ProHance IV gadolinium contrast. FINDINGS: Patient is now status post left-sided craniotomy for resection of meningioma. Diffusion-weighted images demonstrate a small area of the bowel lies tissue along the posterior left frontal lobe at the resection site. There is a small amount of residual blood products at the resection site as well. No definite pathologic contrast enhancement seen to suggest residual tumor. There is been interval improvement in vasogenic edema within the left temporal lobe. There is been complete resolution of mass effect and midline shift. Major intracranial vascular flow voids are preserved. Globes and orbits are within normal limits. There is near complete opacification of the right maxillary sinus, unchanged from prior exam.     1. Interval left-sided craniotomy for resection of meningioma. There is been complete resection of previous CT demonstrated mass. No pathologic contrast enhancement seen to suggest residual tumor. There is a small amount of expected postoperative blood products at the operative site. Electronically Signed: Michael Talbot MD 11/4/2021 19:30 EDT      ECHOCARDIOGRAM:   No previous comparisons available for review.    I reviewed the patient's new clinical results.    This note has been scribed by me for pulmonary attending physician.    Electronically signed by HI Hayes, 11/05/21 at 11:17 EDT.

## 2021-11-05 NOTE — PLAN OF CARE
Pt tolerated OT tx well this date, very motivated. Pt with max difficulty tracking during reading, educated on using finger to assist with tracking and paper underneath each line to assist with visual search and visual ground. Pt completed LE coordination including side stepping, toe walking. R UE coordination including FMC tasks including translation, 2pt pinch with mod difficulty, 3 pt pinch with mod difficulty, opposition with fair coordination. Pt req visual and verbal cues with demonstration for all instruction. Pt shows good progress toward goals. Pt provided with handout of UE/LE coordination tasks for home and BUE therex HEP. REC home with OP OT and wife assist.    Malgorzata Rahman, OTD, OTR

## 2021-11-05 NOTE — DISCHARGE INSTRUCTIONS
Mobilize but no strenuous activity.  Limit fluids and continue salt.  Follow-up in office 2 weeks.  Call with questions or problems.

## 2021-11-06 ENCOUNTER — HOME CARE VISIT (OUTPATIENT)
Dept: HOME HEALTH SERVICES | Facility: HOME HEALTHCARE | Age: 41
End: 2021-11-06

## 2021-11-06 LAB — URATE UR-MCNC: 68 MG/DL

## 2021-11-06 PROCEDURE — G0299 HHS/HOSPICE OF RN EA 15 MIN: HCPCS

## 2021-11-06 NOTE — OUTREACH NOTE
Prep Survey      Responses   [facility]   Is LACE score < 7 ? No   Emergency Room discharge w/ pulse ox? No   Eligibility TCM   [facility]   Date of Admission 11/02/21   Date of Discharge 11/05/21   Discharge Disposition Home-Health Care [facility]   Discharge diagnosis craniotomy for Large left temporoparietal meningioma causing receptive aphasia.   Does the patient have one of the following disease processes/diagnoses(primary or secondary)? General Surgery   Does the patient have Home health ordered? Yes   What is the Home health agency?  [facility]   Is there a DME ordered? No   Prep survey completed? Yes          Jacqueline Wilkinson RN

## 2021-11-07 LAB — QT INTERVAL: 312 MS

## 2021-11-08 ENCOUNTER — HOME CARE VISIT (OUTPATIENT)
Dept: HOME HEALTH SERVICES | Facility: HOME HEALTHCARE | Age: 41
End: 2021-11-08

## 2021-11-08 ENCOUNTER — TRANSITIONAL CARE MANAGEMENT TELEPHONE ENCOUNTER (OUTPATIENT)
Dept: CALL CENTER | Facility: HOSPITAL | Age: 41
End: 2021-11-08

## 2021-11-08 VITALS
DIASTOLIC BLOOD PRESSURE: 98 MMHG | TEMPERATURE: 98.1 F | OXYGEN SATURATION: 97 % | SYSTOLIC BLOOD PRESSURE: 160 MMHG | HEART RATE: 103 BPM

## 2021-11-08 VITALS
OXYGEN SATURATION: 97 % | HEART RATE: 85 BPM | RESPIRATION RATE: 19 BRPM | TEMPERATURE: 98.2 F | SYSTOLIC BLOOD PRESSURE: 152 MMHG | DIASTOLIC BLOOD PRESSURE: 78 MMHG

## 2021-11-08 PROCEDURE — G0151 HHCP-SERV OF PT,EA 15 MIN: HCPCS

## 2021-11-08 NOTE — OUTREACH NOTE
Call Center TCM Note      Responses   [facility] patient discharged from? [facility]   Does the patient have one of the following disease processes/diagnoses(primary or secondary)? General Surgery   TCM attempt successful? No   Unsuccessful attempts Attempt 1          Adali Vaughan RN    11/8/2021, 09:51 EST

## 2021-11-08 NOTE — HOME HEALTH
Patient evaluation completed. Patient underwent crainiotomy with resection of meningioma causing him to have receptive aphasia. Upon eval, patient performed transfers and ambulation without any issues or difficulty. Negotiates steps independently with use of hand rail. Patient reports he walks outdoors for about a mile daily. Patient educated for standing thera ex - marching, heel raises, squats, hip abduction and leg curls. PT eval only at this time.

## 2021-11-08 NOTE — OUTREACH NOTE
Call Center TCM Note      Responses   [facility] patient discharged from? [facility]   Does the patient have one of the following disease processes/diagnoses(primary or secondary)? General Surgery   TCM attempt successful? No   Unsuccessful attempts Attempt 2          Adali Vaughan RN    11/8/2021, 12:51 EST

## 2021-11-08 NOTE — CASE MANAGEMENT/SOCIAL WORK
Case Management Discharge Note      Final Note: [facility] H/H    Provided Post Acute Provider List?: Yes  Post Acute Provider List: Home Health  Delivered To: Patient, Support Person  Support Person: spouse  Method of Delivery: In person    Selected Continued Care - Discharged on 11/5/2021 Admission date: 11/2/2021 - Discharge disposition: Home or Self Care                     Final Discharge Disposition Code: 06 - home with home health care

## 2021-11-08 NOTE — HOME HEALTH
Patient is a 41 year old male referred to [facility] after surgery on the nervous system. He was diagnosed with a large left temporoparietal meningioma causing receptive aphasia. He underwent a craniotomy for resection on November 2nd and after the surgery had some issues with hyponatremia and has been educated to eat high sodium and drink minimal fluids until this has resolved. Originally he was having memory problems and difficulty with reaching, writing, texting and word finding difficulty. He has surgical incision to left side of his head and sutures are intact. He is okay to mobilize but was told to avoid strenous activity. He can also gradually taper steroids per MD and he is to follow up with Dr. Jean in two weeks.

## 2021-11-09 ENCOUNTER — TELEPHONE (OUTPATIENT)
Dept: FAMILY MEDICINE CLINIC | Facility: CLINIC | Age: 41
End: 2021-11-09

## 2021-11-09 ENCOUNTER — TRANSITIONAL CARE MANAGEMENT TELEPHONE ENCOUNTER (OUTPATIENT)
Dept: CALL CENTER | Facility: HOSPITAL | Age: 41
End: 2021-11-09

## 2021-11-09 DIAGNOSIS — K21.00 GASTROESOPHAGEAL REFLUX DISEASE WITH ESOPHAGITIS WITHOUT HEMORRHAGE: ICD-10-CM

## 2021-11-09 RX ORDER — SUCRALFATE 1 G/1
1 TABLET ORAL 4 TIMES DAILY
Qty: 28 TABLET | Refills: 0 | Status: SHIPPED | OUTPATIENT
Start: 2021-11-09 | End: 2021-11-23

## 2021-11-09 RX ORDER — ALBUTEROL SULFATE 90 UG/1
2 AEROSOL, METERED RESPIRATORY (INHALATION) EVERY 4 HOURS PRN
Qty: 18 G | Refills: 2 | Status: SHIPPED | OUTPATIENT
Start: 2021-11-09 | End: 2023-03-27 | Stop reason: SDUPTHER

## 2021-11-09 NOTE — TELEPHONE ENCOUNTER
Please let pt know that I did refill his carafate and also his albuterol inhaler but can you ask him why he has been prescribed the inhaler in the past?  I don't have a diagnosis of asthma in his chart?

## 2021-11-09 NOTE — OUTREACH NOTE
Call Center TCM Note      Responses   [facility] patient discharged from? [facility]   Does the patient have one of the following disease processes/diagnoses(primary or secondary)? General Surgery   TCM attempt successful? No   Unsuccessful attempts Attempt 3   Wrap up additional comments Unable to reach pt x 3 attempts for TCM call. Pt is not yet sched for TCM [facility] with PCP Christa Pineda MA    11/9/2021, 16:49 EST

## 2021-11-10 ENCOUNTER — HOME CARE VISIT (OUTPATIENT)
Dept: HOME HEALTH SERVICES | Facility: HOME HEALTHCARE | Age: 41
End: 2021-11-10

## 2021-11-10 ENCOUNTER — LAB REQUISITION (OUTPATIENT)
Dept: LAB | Facility: HOSPITAL | Age: 41
End: 2021-11-10

## 2021-11-10 VITALS
DIASTOLIC BLOOD PRESSURE: 100 MMHG | HEART RATE: 115 BPM | SYSTOLIC BLOOD PRESSURE: 150 MMHG | OXYGEN SATURATION: 98 % | TEMPERATURE: 97.5 F

## 2021-11-10 DIAGNOSIS — Z48.811 ENCOUNTER FOR SURGICAL AFTERCARE FOLLOWING SURGERY ON THE NERVOUS SYSTEM: ICD-10-CM

## 2021-11-10 LAB
ANION GAP SERPL CALCULATED.3IONS-SCNC: 14 MMOL/L (ref 5–15)
BUN SERPL-MCNC: 24 MG/DL (ref 6–20)
BUN/CREAT SERPL: 34.8 (ref 7–25)
CALCIUM SPEC-SCNC: 8.6 MG/DL (ref 8.6–10.5)
CHLORIDE SERPL-SCNC: 99 MMOL/L (ref 98–107)
CO2 SERPL-SCNC: 22 MMOL/L (ref 22–29)
CREAT SERPL-MCNC: 0.69 MG/DL (ref 0.76–1.27)
GFR SERPL CREATININE-BSD FRML MDRD: 127 ML/MIN/1.73
GLUCOSE SERPL-MCNC: 118 MG/DL (ref 65–99)
POTASSIUM SERPL-SCNC: 3.5 MMOL/L (ref 3.5–5.2)
SODIUM SERPL-SCNC: 135 MMOL/L (ref 136–145)

## 2021-11-10 PROCEDURE — G0299 HHS/HOSPICE OF RN EA 15 MIN: HCPCS

## 2021-11-10 PROCEDURE — 80048 BASIC METABOLIC PNL TOTAL CA: CPT | Performed by: NEUROLOGICAL SURGERY

## 2021-11-10 NOTE — HOME HEALTH
Patient's incision healing appropriately without signs of infection or complication. Patient independent with wound care. BMP collected per orders. Patient denies acute complaints at this time.

## 2021-11-13 ENCOUNTER — READMISSION MANAGEMENT (OUTPATIENT)
Dept: CALL CENTER | Facility: HOSPITAL | Age: 41
End: 2021-11-13

## 2021-11-13 NOTE — OUTREACH NOTE
General Surgery Week 2 Survey      Responses   [facility] patient discharged from? [facility]   Does the patient have one of the following disease processes/diagnoses(primary or secondary)? General Surgery   Week 2 attempt successful? No   Unsuccessful attempts Attempt 1   Call end time 1723   Week 2 call completed? Yes   Revoked No further contact(revokes)-requires comment   Is the patient interested in additional calls from an ambulatory ?  NOTE:  applies to high risk patients requiring additional follow-up. No   Graduated/Revoked comments UTR x 4          Bhargavi Del Toro RN

## 2021-11-19 ENCOUNTER — HOME CARE VISIT (OUTPATIENT)
Dept: HOME HEALTH SERVICES | Facility: HOME HEALTHCARE | Age: 41
End: 2021-11-19

## 2021-11-19 VITALS
DIASTOLIC BLOOD PRESSURE: 78 MMHG | TEMPERATURE: 97.5 F | RESPIRATION RATE: 18 BRPM | SYSTOLIC BLOOD PRESSURE: 140 MMHG | OXYGEN SATURATION: 9 %

## 2021-11-19 PROCEDURE — G0299 HHS/HOSPICE OF RN EA 15 MIN: HCPCS

## 2021-11-20 NOTE — HOME HEALTH
Patient reports no new issues or problems. Denies any medication changes. Incision clean and dry appears to be healing well. Patient reports he is begining to feel like he is thinking more clearly and communicating better.

## 2021-11-22 ENCOUNTER — HOME CARE VISIT (OUTPATIENT)
Dept: HOME HEALTH SERVICES | Facility: HOME HEALTHCARE | Age: 41
End: 2021-11-22

## 2021-11-22 VITALS
TEMPERATURE: 97.8 F | SYSTOLIC BLOOD PRESSURE: 139 MMHG | RESPIRATION RATE: 18 BRPM | HEART RATE: 110 BPM | OXYGEN SATURATION: 98 % | DIASTOLIC BLOOD PRESSURE: 101 MMHG

## 2021-11-22 PROCEDURE — G0153 HHCP-SVS OF S/L PATH,EA 15MN: HCPCS

## 2021-11-23 ENCOUNTER — HOME CARE VISIT (OUTPATIENT)
Dept: HOME HEALTH SERVICES | Facility: HOME HEALTHCARE | Age: 41
End: 2021-11-23

## 2021-11-23 ENCOUNTER — TELEPHONE (OUTPATIENT)
Dept: FAMILY MEDICINE CLINIC | Facility: CLINIC | Age: 41
End: 2021-11-23

## 2021-11-23 VITALS
HEART RATE: 107 BPM | OXYGEN SATURATION: 96 % | SYSTOLIC BLOOD PRESSURE: 158 MMHG | TEMPERATURE: 96.1 F | DIASTOLIC BLOOD PRESSURE: 98 MMHG

## 2021-11-23 DIAGNOSIS — K21.00 GASTROESOPHAGEAL REFLUX DISEASE WITH ESOPHAGITIS WITHOUT HEMORRHAGE: ICD-10-CM

## 2021-11-23 DIAGNOSIS — Z91.89 AT RISK FOR SLEEP APNEA: Primary | ICD-10-CM

## 2021-11-23 PROCEDURE — G0299 HHS/HOSPICE OF RN EA 15 MIN: HCPCS

## 2021-11-23 RX ORDER — SUCRALFATE 1 G/1
TABLET ORAL
Qty: 28 TABLET | Refills: 0 | Status: SHIPPED | OUTPATIENT
Start: 2021-11-23 | End: 2022-11-23

## 2021-11-23 NOTE — HOME HEALTH
Patient reports acid reflux, but denies SOA, CP, recent falls or other symptoms. HTN improved from prior visit, though still present. Patient has began taking regular BPs at home. Advised patient to report BP to physician if elevated above 170s/100s. Called pharmacy to refill sucralfate prescription. All questions addressed.

## 2021-11-23 NOTE — TELEPHONE ENCOUNTER
Caller: Yoan Salas    Relationship: Self    Best call back number: [phone]    What is the medical concern/diagnosis: POSSIBLE SLEEP APNEA     What specialty or service is being requested: SLEEP STUDY

## 2021-11-29 ENCOUNTER — HOME CARE VISIT (OUTPATIENT)
Dept: HOME HEALTH SERVICES | Facility: HOME HEALTHCARE | Age: 41
End: 2021-11-29

## 2021-11-29 ENCOUNTER — TELEPHONE (OUTPATIENT)
Dept: FAMILY MEDICINE CLINIC | Facility: CLINIC | Age: 41
End: 2021-11-29

## 2021-11-29 DIAGNOSIS — I15.8 OTHER SECONDARY HYPERTENSION: ICD-10-CM

## 2021-11-29 PROCEDURE — G0153 HHCP-SVS OF S/L PATH,EA 15MN: HCPCS

## 2021-11-29 RX ORDER — AMLODIPINE BESYLATE 5 MG/1
5 TABLET ORAL DAILY
Qty: 30 TABLET | Refills: 2 | Status: SHIPPED | OUTPATIENT
Start: 2021-11-29 | End: 2022-11-23

## 2021-11-29 NOTE — TELEPHONE ENCOUNTER
Spoke to patient and sent in his medication to restart since he has been out of it.  He will monitor his bp at home and let me know how it is doing in 1 week or earlier if any problems.

## 2021-11-29 NOTE — TELEPHONE ENCOUNTER
Patient called after hours to  as she is on call [facility].     Patient is at home and his speech therapist is there with him .     His BP readings:    11/24/21    159/107                     151/99                     152/99    11/25/21     142/100    11/26/21      146/104    11/29/21       125/101      Patient has been off of his BP medication until 2 weeks as he was told to stop it while on steriods.  Patient had recent brain surgery- tumor removed.   Patient wants to know if he needs to call  his primary care physician in the morning to be seen or what he should do.

## 2021-11-30 VITALS
OXYGEN SATURATION: 97 % | SYSTOLIC BLOOD PRESSURE: 124 MMHG | RESPIRATION RATE: 18 BRPM | HEART RATE: 114 BPM | DIASTOLIC BLOOD PRESSURE: 101 MMHG

## 2021-11-30 NOTE — HOME HEALTH
Patient has been keeping up with blood pressure due to when checked it has been high patient was to keep track and today blood pressure and today we called MD to report the reading .Patient to get a call back from the doctor as to what to do .

## 2021-12-02 ENCOUNTER — HOME CARE VISIT (OUTPATIENT)
Dept: HOME HEALTH SERVICES | Facility: HOME HEALTHCARE | Age: 41
End: 2021-12-02

## 2021-12-02 VITALS
TEMPERATURE: 98.5 F | OXYGEN SATURATION: 97 % | DIASTOLIC BLOOD PRESSURE: 91 MMHG | HEART RATE: 106 BPM | SYSTOLIC BLOOD PRESSURE: 120 MMHG | RESPIRATION RATE: 18 BRPM

## 2021-12-02 PROCEDURE — G0153 HHCP-SVS OF S/L PATH,EA 15MN: HCPCS

## 2021-12-02 NOTE — HOME HEALTH
Patient started  on Amlodipine  on 12/30/21due to blood pressure being very high .  Patient to continue with treatment to address memory defcits problem solving and reading comprehension.

## 2021-12-03 ENCOUNTER — HOME CARE VISIT (OUTPATIENT)
Dept: HOME HEALTH SERVICES | Facility: HOME HEALTHCARE | Age: 41
End: 2021-12-03

## 2021-12-03 VITALS
OXYGEN SATURATION: 99 % | RESPIRATION RATE: 16 BRPM | HEART RATE: 102 BPM | TEMPERATURE: 97.1 F | DIASTOLIC BLOOD PRESSURE: 90 MMHG | SYSTOLIC BLOOD PRESSURE: 136 MMHG

## 2021-12-03 PROCEDURE — G0299 HHS/HOSPICE OF RN EA 15 MIN: HCPCS

## 2021-12-03 NOTE — ANESTHESIA POSTPROCEDURE EVALUATION
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  Pain management: adequate  Airway patency: patent  Anesthetic complications: No anesthetic complications  PONV Status: none  Cardiovascular status: acceptable  Respiratory status: acceptable  Hydration status: acceptable    Comments: Patient seen and examined postoperatively; vital signs stable; SpO2 greater than or equal to 90%; cardiopulmonary status stable; nausea/vomiting adequately controlled; pain adequately controlled; no apparent anesthesia complications; patient discharged from anesthesia care when discharge criteria were met

## 2021-12-03 NOTE — HOME HEALTH
Patient reports intermittent HA that resolves with tylenol or ibuprofen, but denies SOA, CP, recent falls or other symptoms. Patient's cranial incision is healing well without signs of complication. Patient has been compliant with keeping incision clean and dry.  Medications reviewed and f/u appointments reviewed. Patient states he feels ready for SN discipline discharge, all questions addressed.

## 2021-12-06 ENCOUNTER — HOME CARE VISIT (OUTPATIENT)
Dept: HOME HEALTH SERVICES | Facility: HOME HEALTHCARE | Age: 41
End: 2021-12-06

## 2021-12-06 VITALS
SYSTOLIC BLOOD PRESSURE: 126 MMHG | RESPIRATION RATE: 18 BRPM | HEART RATE: 110 BPM | TEMPERATURE: 98.8 F | OXYGEN SATURATION: 97 % | DIASTOLIC BLOOD PRESSURE: 103 MMHG

## 2021-12-06 PROCEDURE — G0153 HHCP-SVS OF S/L PATH,EA 15MN: HCPCS

## 2021-12-09 ENCOUNTER — HOME CARE VISIT (OUTPATIENT)
Dept: HOME HEALTH SERVICES | Facility: HOME HEALTHCARE | Age: 41
End: 2021-12-09

## 2021-12-09 VITALS
DIASTOLIC BLOOD PRESSURE: 100 MMHG | OXYGEN SATURATION: 97 % | HEART RATE: 113 BPM | TEMPERATURE: 98.8 F | SYSTOLIC BLOOD PRESSURE: 117 MMHG | RESPIRATION RATE: 18 BRPM

## 2021-12-09 PROCEDURE — G0153 HHCP-SVS OF S/L PATH,EA 15MN: HCPCS

## 2021-12-13 ENCOUNTER — HOME CARE VISIT (OUTPATIENT)
Dept: HOME HEALTH SERVICES | Facility: HOME HEALTHCARE | Age: 41
End: 2021-12-13

## 2021-12-13 PROCEDURE — G0153 HHCP-SVS OF S/L PATH,EA 15MN: HCPCS

## 2021-12-14 VITALS
TEMPERATURE: 98.3 F | OXYGEN SATURATION: 98 % | RESPIRATION RATE: 20 BRPM | DIASTOLIC BLOOD PRESSURE: 93 MMHG | HEART RATE: 109 BPM | SYSTOLIC BLOOD PRESSURE: 125 MMHG

## 2021-12-16 ENCOUNTER — HOME CARE VISIT (OUTPATIENT)
Dept: HOME HEALTH SERVICES | Facility: HOME HEALTHCARE | Age: 41
End: 2021-12-16

## 2021-12-16 VITALS
SYSTOLIC BLOOD PRESSURE: 120 MMHG | OXYGEN SATURATION: 97 % | DIASTOLIC BLOOD PRESSURE: 93 MMHG | RESPIRATION RATE: 18 BRPM | HEART RATE: 103 BPM

## 2021-12-16 PROCEDURE — G0153 HHCP-SVS OF S/L PATH,EA 15MN: HCPCS

## 2022-01-31 ENCOUNTER — OFFICE VISIT (OUTPATIENT)
Dept: SLEEP MEDICINE | Facility: CLINIC | Age: 42
End: 2022-01-31

## 2022-01-31 VITALS
BODY MASS INDEX: 45.8 KG/M2 | DIASTOLIC BLOOD PRESSURE: 98 MMHG | WEIGHT: 285 LBS | HEART RATE: 106 BPM | SYSTOLIC BLOOD PRESSURE: 148 MMHG | OXYGEN SATURATION: 98 % | HEIGHT: 66 IN

## 2022-01-31 DIAGNOSIS — G47.8 NON-RESTORATIVE SLEEP: ICD-10-CM

## 2022-01-31 DIAGNOSIS — G47.34 NOCTURNAL HYPOXIA: ICD-10-CM

## 2022-01-31 DIAGNOSIS — E66.01 MORBID OBESITY: ICD-10-CM

## 2022-01-31 DIAGNOSIS — G47.10 HYPERSOMNIA: ICD-10-CM

## 2022-01-31 DIAGNOSIS — G47.30 SLEEP APNEA, UNSPECIFIED TYPE: Primary | ICD-10-CM

## 2022-01-31 PROCEDURE — 99214 OFFICE O/P EST MOD 30 MIN: CPT | Performed by: FAMILY MEDICINE

## 2022-01-31 PROCEDURE — G0463 HOSPITAL OUTPT CLINIC VISIT: HCPCS

## 2022-01-31 NOTE — PROGRESS NOTES
[facility] New Patient[facility]Consultation       Reason for Consultation: At risk for sleep apnea      Patient Care Team:  Christa Padron PA as PCP - General (Physician Assistant)  Hellen Carter MD as Consulting Physician (Sleep Medicine)      History of present illness:  Thank you for asking me to see your patient.  The patient is a 42 y.o. 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  Patient reports snoring tossing and turning during sleep.  No history of prior sleep study or tonsillectomy.  Reports hypersomnia nonrestorative sleep snoring waking with dry mouth and weight gain of around 20 pounds in the past 5 years.  There is a family history of sleep apnea.  Patient is morbidly obese with BMI 46. Was told had nocturnal hypoxia while hospitalized.    Sleep Schedule:  Bed time: 11 PM weekdays 2 AM weekends  Sleep latency: Quickly  Wake time: 7:30 AM weekdays 10 AM to 11 AM weekends  Average hours slept: 7.5-8  Non-restorative sleep: Yes  Number of naps per day: 0-1  Rotating shifts?:  No  Nocturia: N  Electronics in bedroom: N    Excessive daytime sleepiness or drowsiness:Y  Any accidents at work due to sleepiness in the last 5 years:N  Any difficulty driving due to sleepiness or being drowsy: N  Weight changed in the last 5 years:Y    Snoring:Y  Witnessed apneas:N  Have you ever awakened gasping for breath, coughing, choking or respiratory discomfort: N  Morning headaches: N  Awaken with a sore throat or dry mouth: Y    Any reports of leg jerking at night: N  Urge sensations: N  Does pain disrupt sleep: N  Sweating during sleep: N  Teeth grinding: N    Any sudden episodes of sleep during the day: N  Sleep paralysis/hallucinations: N  Muscle weakness with laughing/anger: N  Nightmares: N  Sleep walking: N    Are you sleepy when you increase your sleep time: N  Do you sleep better away from your own bed: N    Social History:  no tobacco use  "3-4 alcoholic drinks per week 1-2 caffeinated beverages a day3     Allergies:  Patient has no known allergies.    Family History: DINH yes       Current Outpatient Medications:   •  albuterol sulfate  (90 Base) MCG/ACT inhaler, Inhale 2 puffs Every 4 (Four) Hours As Needed for Wheezing or Shortness of Air., Disp: 18 g, Rfl: 2  •  amLODIPine (NORVASC) 5 MG tablet, Take 1 tablet by mouth Daily., Disp: 30 tablet, Rfl: 2  •  amLODIPine (Norvasc) 5 MG tablet, Take 5 mg by mouth Daily., Disp: , Rfl:   •  cefdinir (OMNICEF) 300 MG capsule, Take 300 mg by mouth 2 (Two) Times a Day., Disp: , Rfl:   •  dexamethasone (DECADRON) 2 MG tablet, Take 1 tablet by mouth 4 times daily for 3 days, twice daily for 3 days, once daily for 3 days, stop, Disp: 21 tablet, Rfl: 0  •  famotidine (PEPCID) 20 MG tablet, Take 20 mg by mouth 2 (Two) Times a Day. Take DOS, Disp: , Rfl:   •  levETIRAcetam (KEPPRA) 500 MG tablet, Take 1 tablet by mouth 2 (Two) Times a Day., Disp: 60 tablet, Rfl: 0  •  omeprazole (priLOSEC) 20 MG capsule, Take 20 mg by mouth Daily., Disp: , Rfl:   •  oxyCODONE-acetaminophen (Percocet) 5-325 MG per tablet, Take 1 tablet by mouth Every 4 (Four) Hours As Needed for Severe Pain ., Disp: 30 tablet, Rfl: 0  •  sodium chloride 1 g tablet, Take 1 tablet by mouth 3 (Three) Times a Day With Meals., Disp: 21 tablet, Rfl: 0  •  sucralfate (CARAFATE) 1 g tablet, TAKE ONE TABLET BY MOUTH FOUR TIMES A DAY, Disp: 28 tablet, Rfl: 0    Vital Signs:    Vitals:    01/31/22 1400   BP: 148/98   BP Location: Left arm   Patient Position: Sitting   Cuff Size: Adult   Pulse: 106   SpO2: 98%   Weight: 129 kg (285 lb)   Height: 167.6 cm (65.98\")      Body mass index is 46.03 kg/m².         REVIEW OF SYSTEMS.  Full review of systems available on the intake form which is scanned in the media tab.  The relevant positive are noted below  1. Daytime excessive sleepiness with [place] Sleepiness Scale :Total score: 6   2. Snoring  3. Anxiety " "depression frequent heartburn      Physical exam:  Vitals:    01/31/22 1400   BP: 148/98   BP Location: Left arm   Patient Position: Sitting   Cuff Size: Adult   Pulse: 106   SpO2: 98%   Weight: 129 kg (285 lb)   Height: 167.6 cm (65.98\")    Body mass index is 46.03 kg/m².    HEENT: Head is atraumatic, normocephalic  Eyes: pupils are round equal and reacting to light and accommodation, conjunctiva normal  Nose: no nasal septal defects or deviation and the nasal passages are clear, no nasal polyps,  Throat: tongue normal, oral airway Mallampati class iv  NECK: , trachea is in the midline, thyroid not enlarged  RESPIRATORY SYSTEM: Breath sounds are equal on both sides, there are no wheezes   CARDIOVASULAR SYSTEM: Heart sounds are regular rhythm and javid rate, no edema  EXTREMITES: No cyanosis, clubbing  NEUROLOGICAL SYSTEM: Oriented x 3, no gross motor defects, gait normal      Impression:  1. Sleep apnea, unspecified type    2. Hypersomnia    3. Non-restorative sleep    4. Nocturnal hypoxia    5. Morbid obesity (HCC)        Plan:    Good sleep hygiene measures should be maintained.  Weight loss would be beneficial in this patient who is morbidly obese BMI 46.    I discussed the pathophysiology of obstructive sleep apnea with the patient.  We discussed the adverse outcomes associated with untreated sleep-disordered breathing.  We discussed treatment modalities of obstructive sleep apnea including CPAP device as well as oral mandibular advancement device. Sleep study will be scheduled to establish definitive diagnosis of sleep disorder breathing.  Weight loss will be strongly beneficial in order to reduce the severity of sleep-disordered breathing.  Patient has narrow oropharyngeal structure.  Caution during activities that require prolonged concentration is strongly advised.  Patient will be notified of sleep study results after sleep study is completed.  If sleep apnea is only mild,  oral mandibular advancement " device may be one of the treatment options.  However if sleep apnea is moderately severe, CPAP treatment will be strongly encouraged.  The patient is not opposed to treatment with CPAP device if we confirm significant obstructive sleep apnea on polysomnography.     Thank you for allowing me to participate in your patient's care.    Hellen Carter MD  Sleep Medicine  01/31/22  14:24 EST

## 2022-03-03 ENCOUNTER — HOSPITAL ENCOUNTER (OUTPATIENT)
Dept: SLEEP MEDICINE | Facility: HOSPITAL | Age: 42
Discharge: HOME | End: 2022-03-03
Admitting: FAMILY MEDICINE

## 2022-03-03 DIAGNOSIS — G47.30 SLEEP APNEA, UNSPECIFIED TYPE: ICD-10-CM

## 2022-03-03 DIAGNOSIS — G47.10 HYPERSOMNIA: ICD-10-CM

## 2022-03-03 DIAGNOSIS — G47.8 NON-RESTORATIVE SLEEP: ICD-10-CM

## 2022-03-03 PROCEDURE — 95806 SLEEP STUDY UNATT&RESP EFFT: CPT | Performed by: FAMILY MEDICINE

## 2022-03-03 PROCEDURE — 95806 SLEEP STUDY UNATT&RESP EFFT: CPT

## 2022-03-14 DIAGNOSIS — G47.34 NOCTURNAL HYPOXIA: ICD-10-CM

## 2022-03-14 DIAGNOSIS — G47.33 OBSTRUCTIVE SLEEP APNEA: Primary | ICD-10-CM

## 2022-03-14 DIAGNOSIS — G47.8 NON-RESTORATIVE SLEEP: ICD-10-CM

## 2022-03-14 DIAGNOSIS — E66.01 MORBID OBESITY: ICD-10-CM

## 2022-03-14 DIAGNOSIS — G47.10 HYPERSOMNIA: ICD-10-CM

## 2022-08-08 ENCOUNTER — TELEPHONE (OUTPATIENT)
Dept: FAMILY MEDICINE CLINIC | Facility: CLINIC | Age: 42
End: 2022-08-08

## 2022-08-08 NOTE — TELEPHONE ENCOUNTER
Caller: Yoan Salas    Relationship: Self    Best call back number: [phone]    Who are you requesting to speak with (clinical staff, provider,  specific staff member): EMMANUEL LIN    What was the call regarding: PATIENT HAS SLEEP STUDY DONE FEW MONTHS AGO AND BEEN ON A CPAP MACHINE FOR A FEW MONTHS NOW AND STATES HE BELIEVES QUALITY OF HIS LIFE HAS GONE DOWN SINCE BEING ON CPAP AND WANTS TO DISCUSS THIS WITH EMMANUEL LIN AND WANTS TO STOP USING THE MACHINE      Do you require a callback: YES

## 2022-08-10 NOTE — TELEPHONE ENCOUNTER
PATIENT CALLING TO CHECK STATUS ON CALLBACK REQUEST PLEASE CALL AND ADVISE     CALLBACK # [phone]

## 2022-08-10 NOTE — TELEPHONE ENCOUNTER
Spoke with pt regarding his cpap machine. He is having a hard time with wearing it due to the mask and air flow, all of it.  He would like to wait on using it for now and focus more on weight loss.  Discussed risks of untreated severe sleep apnea.  He has moderate sleep apnea.  He doesn't have any symptoms of sleep apnea.  He will continue to monitor symptoms and if not improving with weight loss, I dicussed that it would be good to try the cpap machine again or look into the mouth piece.

## 2022-11-22 ENCOUNTER — APPOINTMENT (OUTPATIENT)
Dept: CT IMAGING | Facility: HOSPITAL | Age: 42
End: 2022-11-22

## 2022-11-22 ENCOUNTER — HOSPITAL ENCOUNTER (OUTPATIENT)
Facility: HOSPITAL | Age: 42
Setting detail: OBSERVATION
Discharge: HOME | End: 2022-11-23
Attending: EMERGENCY MEDICINE | Admitting: HOSPITALIST

## 2022-11-22 DIAGNOSIS — R56.9 SEIZURE-LIKE ACTIVITY: Primary | ICD-10-CM

## 2022-11-22 LAB
ALBUMIN SERPL-MCNC: 4.7 G/DL (ref 3.5–5.2)
ALBUMIN/GLOB SERPL: 1.3 G/DL
ALP SERPL-CCNC: 114 U/L (ref 39–117)
ALT SERPL W P-5'-P-CCNC: 31 U/L (ref 1–41)
AMPHET+METHAMPHET UR QL: NEGATIVE
ANION GAP SERPL CALCULATED.3IONS-SCNC: 19 MMOL/L (ref 5–15)
AST SERPL-CCNC: 24 U/L (ref 1–40)
BACTERIA UR QL AUTO: ABNORMAL /HPF
BARBITURATES UR QL SCN: NEGATIVE
BASOPHILS # BLD AUTO: 0 10*3/MM3 (ref 0–0.2)
BASOPHILS NFR BLD AUTO: 0.5 % (ref 0–1.5)
BENZODIAZ UR QL SCN: NEGATIVE
BILIRUB SERPL-MCNC: 0.3 MG/DL (ref 0–1.2)
BILIRUB UR QL STRIP: NEGATIVE
BUN SERPL-MCNC: 11 MG/DL (ref 6–20)
BUN/CREAT SERPL: 8.9 (ref 7–25)
CALCIUM SPEC-SCNC: 9.5 MG/DL (ref 8.6–10.5)
CANNABINOIDS SERPL QL: POSITIVE
CHLORIDE SERPL-SCNC: 99 MMOL/L (ref 98–107)
CLARITY UR: CLEAR
CO2 SERPL-SCNC: 20 MMOL/L (ref 22–29)
COCAINE UR QL: NEGATIVE
COLOR UR: YELLOW
CREAT SERPL-MCNC: 1.23 MG/DL (ref 0.76–1.27)
DEPRECATED RDW RBC AUTO: 46.4 FL (ref 37–54)
EGFRCR SERPLBLD CKD-EPI 2021: 75.6 ML/MIN/1.73
EOSINOPHIL # BLD AUTO: 0.1 10*3/MM3 (ref 0–0.4)
EOSINOPHIL NFR BLD AUTO: 0.8 % (ref 0.3–6.2)
ERYTHROCYTE [DISTWIDTH] IN BLOOD BY AUTOMATED COUNT: 17.3 % (ref 12.3–15.4)
ETHANOL UR QL: <0.01 %
GLOBULIN UR ELPH-MCNC: 3.5 GM/DL
GLUCOSE SERPL-MCNC: 123 MG/DL (ref 65–99)
GLUCOSE UR STRIP-MCNC: NEGATIVE MG/DL
HCT VFR BLD AUTO: 37.9 % (ref 37.5–51)
HGB BLD-MCNC: 12 G/DL (ref 13–17.7)
HGB UR QL STRIP.AUTO: ABNORMAL
HOLD SPECIMEN: NORMAL
HYALINE CASTS UR QL AUTO: ABNORMAL /LPF
KETONES UR QL STRIP: ABNORMAL
LEUKOCYTE ESTERASE UR QL STRIP.AUTO: NEGATIVE
LYMPHOCYTES # BLD AUTO: 1.7 10*3/MM3 (ref 0.7–3.1)
LYMPHOCYTES NFR BLD AUTO: 19.3 % (ref 19.6–45.3)
MCH RBC QN AUTO: 24.3 PG (ref 26.6–33)
MCHC RBC AUTO-ENTMCNC: 31.7 G/DL (ref 31.5–35.7)
MCV RBC AUTO: 76.9 FL (ref 79–97)
METHADONE UR QL SCN: NEGATIVE
MONOCYTES # BLD AUTO: 0.4 10*3/MM3 (ref 0.1–0.9)
MONOCYTES NFR BLD AUTO: 4.8 % (ref 5–12)
NEUTROPHILS NFR BLD AUTO: 6.5 10*3/MM3 (ref 1.7–7)
NEUTROPHILS NFR BLD AUTO: 74.6 % (ref 42.7–76)
NITRITE UR QL STRIP: NEGATIVE
NRBC BLD AUTO-RTO: 0 /100 WBC (ref 0–0.2)
OPIATES UR QL: NEGATIVE
OXYCODONE UR QL SCN: NEGATIVE
PH UR STRIP.AUTO: 6 [PH] (ref 5–8)
PLATELET # BLD AUTO: 403 10*3/MM3 (ref 140–450)
PMV BLD AUTO: 7.7 FL (ref 6–12)
POTASSIUM SERPL-SCNC: 4.1 MMOL/L (ref 3.5–5.2)
PROT SERPL-MCNC: 8.2 G/DL (ref 6–8.5)
PROT UR QL STRIP: ABNORMAL
RBC # BLD AUTO: 4.92 10*6/MM3 (ref 4.14–5.8)
RBC # UR STRIP: ABNORMAL /HPF
REF LAB TEST METHOD: ABNORMAL
SODIUM SERPL-SCNC: 138 MMOL/L (ref 136–145)
SP GR UR STRIP: 1.01 (ref 1–1.03)
SQUAMOUS #/AREA URNS HPF: ABNORMAL /HPF
UROBILINOGEN UR QL STRIP: ABNORMAL
WBC # UR STRIP: ABNORMAL /HPF
WBC NRBC COR # BLD: 8.7 10*3/MM3 (ref 3.4–10.8)
WHOLE BLOOD HOLD COAG: NORMAL

## 2022-11-22 PROCEDURE — 85025 COMPLETE CBC W/AUTO DIFF WBC: CPT | Performed by: NURSE PRACTITIONER

## 2022-11-22 PROCEDURE — 84466 ASSAY OF TRANSFERRIN: CPT | Performed by: HOSPITALIST

## 2022-11-22 PROCEDURE — 80307 DRUG TEST PRSMV CHEM ANLYZR: CPT | Performed by: NURSE PRACTITIONER

## 2022-11-22 PROCEDURE — 83540 ASSAY OF IRON: CPT | Performed by: HOSPITALIST

## 2022-11-22 PROCEDURE — 84443 ASSAY THYROID STIM HORMONE: CPT | Performed by: HOSPITALIST

## 2022-11-22 PROCEDURE — 81001 URINALYSIS AUTO W/SCOPE: CPT | Performed by: NURSE PRACTITIONER

## 2022-11-22 PROCEDURE — 82746 ASSAY OF FOLIC ACID SERUM: CPT | Performed by: HOSPITALIST

## 2022-11-22 PROCEDURE — 99285 EMERGENCY DEPT VISIT HI MDM: CPT

## 2022-11-22 PROCEDURE — 70450 CT HEAD/BRAIN W/O DYE: CPT

## 2022-11-22 PROCEDURE — 82607 VITAMIN B-12: CPT | Performed by: HOSPITALIST

## 2022-11-22 PROCEDURE — 0 LEVETIRACETAM IN NACL 0.75% 1000 MG/100ML SOLUTION: Performed by: NURSE PRACTITIONER

## 2022-11-22 PROCEDURE — G0378 HOSPITAL OBSERVATION PER HR: HCPCS

## 2022-11-22 PROCEDURE — 96374 THER/PROPH/DIAG INJ IV PUSH: CPT

## 2022-11-22 PROCEDURE — 80053 COMPREHEN METABOLIC PANEL: CPT | Performed by: NURSE PRACTITIONER

## 2022-11-22 PROCEDURE — 82077 ASSAY SPEC XCP UR&BREATH IA: CPT | Performed by: NURSE PRACTITIONER

## 2022-11-22 PROCEDURE — 93005 ELECTROCARDIOGRAM TRACING: CPT | Performed by: NURSE PRACTITIONER

## 2022-11-22 PROCEDURE — 82728 ASSAY OF FERRITIN: CPT | Performed by: HOSPITALIST

## 2022-11-22 RX ORDER — SODIUM CHLORIDE 0.9 % (FLUSH) 0.9 %
10 SYRINGE (ML) INJECTION EVERY 12 HOURS SCHEDULED
Status: DISCONTINUED | OUTPATIENT
Start: 2022-11-22 | End: 2022-11-23 | Stop reason: HOSPADM

## 2022-11-22 RX ORDER — SODIUM CHLORIDE 9 MG/ML
40 INJECTION, SOLUTION INTRAVENOUS AS NEEDED
Status: DISCONTINUED | OUTPATIENT
Start: 2022-11-22 | End: 2022-11-23 | Stop reason: HOSPADM

## 2022-11-22 RX ORDER — ACETAMINOPHEN 500 MG
1000 TABLET ORAL ONCE
Status: COMPLETED | OUTPATIENT
Start: 2022-11-22 | End: 2022-11-22

## 2022-11-22 RX ORDER — LEVETIRACETAM 10 MG/ML
1000 INJECTION INTRAVASCULAR ONCE
Status: COMPLETED | OUTPATIENT
Start: 2022-11-22 | End: 2022-11-22

## 2022-11-22 RX ORDER — SODIUM CHLORIDE 0.9 % (FLUSH) 0.9 %
10 SYRINGE (ML) INJECTION AS NEEDED
Status: DISCONTINUED | OUTPATIENT
Start: 2022-11-22 | End: 2022-11-23 | Stop reason: HOSPADM

## 2022-11-22 RX ORDER — LEVETIRACETAM 500 MG/1
1000 TABLET ORAL 2 TIMES DAILY
Status: DISCONTINUED | OUTPATIENT
Start: 2022-11-22 | End: 2022-11-22

## 2022-11-22 RX ORDER — ACETAMINOPHEN 325 MG/1
650 TABLET ORAL EVERY 6 HOURS PRN
Status: DISCONTINUED | OUTPATIENT
Start: 2022-11-22 | End: 2022-11-23 | Stop reason: HOSPADM

## 2022-11-22 RX ORDER — ENOXAPARIN SODIUM 100 MG/ML
40 INJECTION SUBCUTANEOUS DAILY
Status: DISCONTINUED | OUTPATIENT
Start: 2022-11-23 | End: 2022-11-23 | Stop reason: HOSPADM

## 2022-11-22 RX ADMIN — LEVETIRACETAM 1000 MG: 10 INJECTION INTRAVASCULAR at 22:55

## 2022-11-22 RX ADMIN — ACETAMINOPHEN 1000 MG: 500 TABLET ORAL at 22:55

## 2022-11-22 RX ADMIN — SODIUM CHLORIDE 500 ML: 9 INJECTION, SOLUTION INTRAVENOUS at 21:44

## 2022-11-23 ENCOUNTER — APPOINTMENT (OUTPATIENT)
Dept: MRI IMAGING | Facility: HOSPITAL | Age: 42
End: 2022-11-23

## 2022-11-23 ENCOUNTER — READMISSION MANAGEMENT (OUTPATIENT)
Dept: CALL CENTER | Facility: HOSPITAL | Age: 42
End: 2022-11-23

## 2022-11-23 VITALS
SYSTOLIC BLOOD PRESSURE: 123 MMHG | HEIGHT: 72 IN | BODY MASS INDEX: 35.21 KG/M2 | OXYGEN SATURATION: 99 % | WEIGHT: 260 LBS | HEART RATE: 94 BPM | TEMPERATURE: 98.2 F | DIASTOLIC BLOOD PRESSURE: 83 MMHG | RESPIRATION RATE: 14 BRPM

## 2022-11-23 LAB
ANION GAP SERPL CALCULATED.3IONS-SCNC: 10 MMOL/L (ref 5–15)
BUN SERPL-MCNC: 11 MG/DL (ref 6–20)
BUN/CREAT SERPL: 11 (ref 7–25)
CALCIUM SPEC-SCNC: 9 MG/DL (ref 8.6–10.5)
CHLORIDE SERPL-SCNC: 104 MMOL/L (ref 98–107)
CO2 SERPL-SCNC: 25 MMOL/L (ref 22–29)
CREAT SERPL-MCNC: 1 MG/DL (ref 0.76–1.27)
EGFRCR SERPLBLD CKD-EPI 2021: 97 ML/MIN/1.73
FERRITIN SERPL-MCNC: 41.99 NG/ML (ref 30–400)
FOLATE SERPL-MCNC: 12.3 NG/ML (ref 4.78–24.2)
GLUCOSE SERPL-MCNC: 118 MG/DL (ref 65–99)
IRON 24H UR-MRATE: 29 MCG/DL (ref 59–158)
IRON SATN MFR SERPL: 5 % (ref 20–50)
MAGNESIUM SERPL-MCNC: 2.5 MG/DL (ref 1.6–2.6)
PHOSPHATE SERPL-MCNC: 3.5 MG/DL (ref 2.5–4.5)
POTASSIUM SERPL-SCNC: 4.1 MMOL/L (ref 3.5–5.2)
SODIUM SERPL-SCNC: 139 MMOL/L (ref 136–145)
TIBC SERPL-MCNC: 623 MCG/DL (ref 298–536)
TRANSFERRIN SERPL-MCNC: 418 MG/DL (ref 200–360)
TSH SERPL DL<=0.05 MIU/L-ACNC: 3.12 UIU/ML (ref 0.27–4.2)
VIT B12 BLD-MCNC: 713 PG/ML (ref 211–946)
WHOLE BLOOD HOLD SPECIMEN: NORMAL

## 2022-11-23 PROCEDURE — 83735 ASSAY OF MAGNESIUM: CPT | Performed by: NURSE PRACTITIONER

## 2022-11-23 PROCEDURE — 25010000002 GADOTERIDOL PER 1 ML: Performed by: HOSPITALIST

## 2022-11-23 PROCEDURE — G0378 HOSPITAL OBSERVATION PER HR: HCPCS

## 2022-11-23 PROCEDURE — 96372 THER/PROPH/DIAG INJ SC/IM: CPT

## 2022-11-23 PROCEDURE — A9579 GAD-BASE MR CONTRAST NOS,1ML: HCPCS | Performed by: HOSPITALIST

## 2022-11-23 PROCEDURE — 80048 BASIC METABOLIC PNL TOTAL CA: CPT | Performed by: NURSE PRACTITIONER

## 2022-11-23 PROCEDURE — 84100 ASSAY OF PHOSPHORUS: CPT | Performed by: NURSE PRACTITIONER

## 2022-11-23 PROCEDURE — 36415 COLL VENOUS BLD VENIPUNCTURE: CPT | Performed by: NURSE PRACTITIONER

## 2022-11-23 PROCEDURE — 70553 MRI BRAIN STEM W/O & W/DYE: CPT

## 2022-11-23 PROCEDURE — 99214 OFFICE O/P EST MOD 30 MIN: CPT | Performed by: NURSE PRACTITIONER

## 2022-11-23 PROCEDURE — 25010000002 ENOXAPARIN PER 10 MG: Performed by: NURSE PRACTITIONER

## 2022-11-23 RX ORDER — LEVETIRACETAM 500 MG/1
500 TABLET ORAL 2 TIMES DAILY
Qty: 180 TABLET | Refills: 1 | Status: SHIPPED | OUTPATIENT
Start: 2022-11-23 | End: 2022-12-01 | Stop reason: SDUPTHER

## 2022-11-23 RX ADMIN — GADOTERIDOL 20 ML: 279.3 INJECTION, SOLUTION INTRAVENOUS at 08:26

## 2022-11-23 RX ADMIN — ENOXAPARIN SODIUM 40 MG: 100 INJECTION SUBCUTANEOUS at 06:43

## 2022-11-23 RX ADMIN — Medication 10 ML: at 08:21

## 2022-11-23 RX ADMIN — ACETAMINOPHEN 650 MG: 325 TABLET, FILM COATED ORAL at 11:43

## 2022-11-23 NOTE — CONSULTS
Primary Care Provider: Christa Padron PA     Consult requested by:, Marybeth garay NP    Reason for Consultation: Neurological evaluation, seizure    History taken from: patient chart RN    Chief complaint: Seizure       SUBJECTIVE:    History of present illness: Background per H&P: Yoan Salas is a 42 y.o. male with past medical history of craniotomy on November 3, 2021 for benign meningioma resection ( took prophylactic keppra for 3 mo following surgery) , GERD, and takes CBD and THC for reports of PTSD and trauma who presented to [facility] on 11/22/2022 complaining of seizure.     Patient and spouse in room to give report.  Patient reports last remembers standing at the sink doing dishes with his spouse.  Spouse reports he suddenly went into a blank stare and started shaking all over and she assisted him to lay down on the ground on his right side.  He was unable to follow commands but eyes were open and not responding.  EMS was called.  She noted blood coming from his mouth as he had bit his tongue.  When EMS arrived he was still unresponsive but began following commands and return to baseline once in the ER  - Portions of the above HPI were copied from previous encounters and edited as appropriate. PMH as detailed below.     Review of Systems   Constitutional: Negative.    HENT: Negative.    Eyes: Negative.    Respiratory: Negative.    Cardiovascular: Negative.    Gastrointestinal: Negative.    Endocrine: Negative.    Genitourinary: Negative.    Musculoskeletal: Negative.    Skin: Negative.    Allergic/Immunologic: Negative.    Neurological: Positive for seizures. Negative for dizziness, tremors, syncope, facial asymmetry, speech difficulty, weakness, light-headedness, numbness and headaches.   Hematological: Negative.    Psychiatric/Behavioral: Negative for confusion.           PATIENT HISTORY:  Past Medical History:   Diagnosis Date   • Allergic    • Anxiety    • Asthma    • Cancer (HCC)      ALL as child    • Class 3 severe obesity with body mass index (BMI) of 40.0 to 44.9 in adult 11/2/2021   • Depression    • GERD (gastroesophageal reflux disease)    • Hypertension    • Leukemia in remission, childhood; S/P Radiation    ,   Past Surgical History:   Procedure Laterality Date   • CRANIOTOMY FOR TUMOR Left 11/2/2021    Procedure: CRANIOTOMY FOR RESECTION OF LEFT PARIETAL TUMOR;  Surgeon: Francois Jean MD;  Location: [facility] MAIN OR;  Service: Neurosurgery;  Laterality: Left;   • HERNIA REPAIR      6 MONTHS OLD   • PORTACATH PLACEMENT      CHILDHOOD   ,   Family History   Problem Relation Age of Onset   • Cancer Maternal Grandfather    ,   Social History     Tobacco Use   • Smoking status: Never   • Smokeless tobacco: Never   Vaping Use   • Vaping Use: Never used   Substance Use Topics   • Alcohol use: Yes     Comment: Weekends   • Drug use: Never   ,   Prior to Admission medications    Medication Sig Start Date End Date Taking? Authorizing Provider   albuterol sulfate  (90 Base) MCG/ACT inhaler Inhale 2 puffs Every 4 (Four) Hours As Needed for Wheezing or Shortness of Air. 11/9/21  Yes Christa Padron PA   levETIRAcetam (KEPPRA) 500 MG tablet Take 1 tablet by mouth 2 (Two) Times a Day. 11/5/21  Yes Ben Braun PharmD   omeprazole (priLOSEC) 20 MG capsule Take 20 mg by mouth Daily. 11/23/21  Yes Crescencio Vazquez MD   amLODIPine (NORVASC) 5 MG tablet Take 1 tablet by mouth Daily. 11/29/21 11/23/22  Christa Padron PA   amLODIPine (NORVASC) 5 MG tablet Take 5 mg by mouth Daily. 11/30/21 11/23/22  Christa Padron PA   cefdinir (OMNICEF) 300 MG capsule Take 300 mg by mouth 2 (Two) Times a Day. 12/11/21 11/23/22  Crescencio Vazquez MD   dexamethasone (DECADRON) 2 MG tablet Take 1 tablet by mouth 4 times daily for 3 days, twice daily for 3 days, once daily for 3 days, stop 11/5/21 11/23/22  Ben Braun, Marie   famotidine (PEPCID) 20 MG tablet Take 20 mg by mouth 2 (Two) Times a Day.  Take DOS  11/23/22  Provider, MD Crescencio   oxyCODONE-acetaminophen (Percocet) 5-325 MG per tablet Take 1 tablet by mouth Every 4 (Four) Hours As Needed for Severe Pain . 11/5/21 11/23/22  Ac Cabral APRN   sodium chloride 1 g tablet Take 1 tablet by mouth 3 (Three) Times a Day With Meals. 11/5/21 11/23/22  Ben Braun, Marie   sucralfate (CARAFATE) 1 g tablet TAKE ONE TABLET BY MOUTH FOUR TIMES A DAY 11/23/21 11/23/22  Christa Padron PA    Allergies:  Patient has no known allergies.    Current Facility-Administered Medications   Medication Dose Route Frequency Provider Last Rate Last Admin   • acetaminophen (TYLENOL) tablet 650 mg  650 mg Oral Q6H PRN Krystal Menard APRN       • Enoxaparin Sodium (LOVENOX) syringe 40 mg  40 mg Subcutaneous Daily Krystal Menard APRN   40 mg at 11/23/22 0643   • sodium chloride 0.9 % flush 10 mL  10 mL Intravenous Q12H Krystal Menard APRN   10 mL at 11/23/22 0821   • sodium chloride 0.9 % flush 10 mL  10 mL Intravenous PRN Krystal Menard APRN       • sodium chloride 0.9 % infusion 40 mL  40 mL Intravenous PRN Krystal Menard APRN         Current Outpatient Medications   Medication Sig Dispense Refill   • albuterol sulfate  (90 Base) MCG/ACT inhaler Inhale 2 puffs Every 4 (Four) Hours As Needed for Wheezing or Shortness of Air. 18 g 2   • levETIRAcetam (KEPPRA) 500 MG tablet Take 1 tablet by mouth 2 (Two) Times a Day. 60 tablet 0   • omeprazole (priLOSEC) 20 MG capsule Take 20 mg by mouth Daily.          ________________________________________________________        OBJECTIVE:    PHYSICAL EXAM:    Constitutional: The patient is in no apparent distress, bright awake and alert. There is no shortness of breath.     PSYCHIATRIC: Mood/affect normal, judgement normal, appropriate    HEENT: Normocephalic, atraumatic.     Chest: Breathing unlabored    Cardiac: Regular rate and rhythm.     Extremities:  No clubbing, cyanosis or  edema.    NEUROLOGICAL:    Cognition:   Fully oriented.  Fund of knowledge excellent.  Concentration and attention normal.   Language normal with normal comprehension, fluent speech, intact repetition and naming.   Short and long term memory appears intact    Cranial nerves;    II - pupils bilaterally equal reacting to light,  No new Visual field deficits;  Fundoscopic exam- Not able to be done, non-dilated exam  III,IV,VI: EOMI with no diplopia  V: Normal facial sensations  VII: No facial asymmetry,  VIII: No New hearing abnormality  IX, X, XI: normal gag and shoulder shrug;  XII: tongue is in the midline.    Sensory:  Intact to light touch in all extremities.     Motor: Strength 5/5 bilaterally upper and lower extremities. No involuntary movements present. Normal tone and bulk.       Cerebellar: Finger to nose and mirror movements normal bilaterally.    Gait and balance: Deferred.     Physical exam performed by MK Bravo.  ________________________________________________________   RESULTS REVIEW:    VITAL SIGNS:   Temp:  [98 °F (36.7 °C)-98.2 °F (36.8 °C)] 98.2 °F (36.8 °C)  Heart Rate:  [] 87  Resp:  [10-18] 17  BP: ()/(51-99) 124/75     LABS:      Lab 11/22/22 1945   WBC 8.70   HEMOGLOBIN 12.0*   HEMATOCRIT 37.9   PLATELETS 403   NEUTROS ABS 6.50   LYMPHS ABS 1.70   MONOS ABS 0.40   EOS ABS 0.10   MCV 76.9*         Lab 11/22/22 1945   SODIUM 138   POTASSIUM 4.1   CHLORIDE 99   CO2 20.0*   ANION GAP 19.0*   BUN 11   CREATININE 1.23   EGFR 75.6   GLUCOSE 123*   CALCIUM 9.5   TSH 3.120         Lab 11/22/22 1945   TOTAL PROTEIN 8.2   ALBUMIN 4.70   GLOBULIN 3.5   ALT (SGPT) 31   AST (SGOT) 24   BILIRUBIN 0.3   ALK PHOS 114                 Lab 11/22/22 1945   IRON 29*   IRON SATURATION 5*   TIBC 623*   TRANSFERRIN 418*   FERRITIN 41.99         UA    Urinalysis 11/22/22 11/22/22 2145 2145   Squamous Epithelial Cells, UA  0-2   Specific [place], UA 1.011    Ketones, UA Trace (A)    Blood, UA  Small (1+) (A)    Leukocytes, UA Negative    Nitrite, UA Negative    RBC, UA  0-2 (A)   WBC, UA  0-2 (A)   Bacteria, UA  None Seen   (A) Abnormal value              Lab Results   Component Value Date    TSH 3.120 2022    LDL 87 2021    HGBA1C 6.2 (H) 2021    [id number] 476 2021       IMAGING STUDIES:  CT Head Without Contrast    Result Date: 2022  1.     No acute intracranial abnormality is identified. 2.     Postoperative changes from left craniotomy/meningioma resection.  Electronically Signed By-Keeley Simms MD On:2022 9:42 PM This report was finalized on [id number] by  Keeley Simms MD.    MRI Brain With & Without Contrast    Result Date: 2022  1. Left frontal temporal craniotomy for previous meningioma resection. No pathologic enhancement is seen, and there is no indication of tumor recurrence.  Electronically Signed By-Sonia Souza MD On:2022 9:11 AM This report was finalized on [id number] by  Sonia Souza MD.      I reviewed the patient's new clinical results.    ________________________________________________________     PROBLEM LIST:    Seizure-like activity (HCC)            ASSESSMENT/PLAN:  1. Seizure, new onset, recent history of left temporal parietal craniotomy.   - MRI brain with and without contrast reviewed just showed left temporal parietal craniotomy changes, no acute findings.    - EKG: Sinus tachycardia, rate 101  - Labs: TSH: 3.120, Ma.5, Phos: 3.5 , Na: 138, Ca: 9.5  - Keppra 500 mg BID  - Follow up with Neurology outpatient for management  - Seizure precuations (see below)   - Maintain healthy lifestyle including stress management, routine sleep schedule, and a well balanced diet.  Alcohol and drugs lowers the seizure threshold and should be avoided. Take all medications as prescribed.    2. Positive drug screen for THC  Discussed drug use and lowering seizure threshold with patient    SEIZURE/SYNCOPE  INSTRUCTIONS:  -Recommended to observe all seizure precautions, including, but not limited to:   -No driving until seizure free for more than 3 months- as per State driving regulation / law;   -Avoid all high-risk activity, Take showers instead of baths, Avoid swimming without observation, Avoid open heat sources, Avoid working at heights, and Avoid engaging in all potentially hazardous activities.   -Patient expressed clear understanding.    There are no further recommendations. Will sign off, please call with any questions or concerns.    I discussed the patient's findings and my recommendations with patient, family and nursing staff    HI Willingham  11/23/22  10:11 EST

## 2022-11-23 NOTE — DISCHARGE SUMMARY
[facility]  Discharge Summary    Date of Service: 2022  Patient Name: Yoan Salas  : 1980  MRN: [id number]  Patient condition: Stable    Date of Admission: 2022  Discharge Diagnosis: New onset seizure   Date of Discharge:  2022  Primary Care Physician: Christa Padron PA      Presenting Problem:   Seizure-like activity (HCC) [R56.9]    Active and Resolved Hospital Problems:  Active Hospital Problems    Diagnosis POA   • **Seizure-like activity (HCC) [R56.9] Yes      Resolved Hospital Problems   No resolved problems to display.         Hospital Course     Hospital Course:  Yoan Salas is a 42 y.o. male Patient is a 42-year-old gentleman with history of benign meningioma status postresection last year who presented with new onset seizure.  Patient reports postop he was on seizure medication for 3 months prophylactically due to suspected possible seizure but has had no seizures since he has been off the medications.  He denies any other symptoms no evidence of any infection.  THC was positive in the urine.  Patient is currently back to normal.  Was seen by neurology and had an MRI of the brain which was negative for any acute abnormality.  He was cleared by neurology for discharge with Keppra 500 mg twice daily and follow-up as an outpatient.        DISCHARGE Follow Up Recommendations for labs and diagnostics:       Reasons For Change In Medications and Indications for New Medications:      Day of Discharge     Vital Signs:  Temp:  [98 °F (36.7 °C)-98.2 °F (36.8 °C)] 98.2 °F (36.8 °C)  Heart Rate:  [] 94  Resp:  [10-18] 14  BP: ()/(51-99) 123/83    Physical Exam:  Physical Exam   General: No acute distress  HEENT: neck supple, normal oral mucosa, no masses, no lymphadenopathy  Lungs: Clear bilaterally, no wheezing, No crackles, No Rhonchi. Equal excursions.   CV - Normal S1/S2, no murmur, Regular rate and rhythm   Abdomin - Soft,  non-tender, non-distended, normal bowel sounds  Extremities - no edema, no erythema  Neuro - No focal weakness, normal sensation  Psych - Alert and oriented x3  Skin - no wounds or lesions.         Pertinent  and/or Most Recent Results     LAB RESULTS:      Lab 11/22/22 1945   WBC 8.70   HEMOGLOBIN 12.0*   HEMATOCRIT 37.9   PLATELETS 403   NEUTROS ABS 6.50   LYMPHS ABS 1.70   MONOS ABS 0.40   EOS ABS 0.10   MCV 76.9*         Lab 11/23/22  0947 11/22/22 1945   SODIUM 139 138   POTASSIUM 4.1 4.1   CHLORIDE 104 99   CO2 25.0 20.0*   ANION GAP 10.0 19.0*   BUN 11 11   CREATININE 1.00 1.23   EGFR 97.0 75.6   GLUCOSE 118* 123*   CALCIUM 9.0 9.5   MAGNESIUM 2.5  --    PHOSPHORUS 3.5  --    TSH  --  3.120         Lab 11/22/22 1945   TOTAL PROTEIN 8.2   ALBUMIN 4.70   GLOBULIN 3.5   ALT (SGPT) 31   AST (SGOT) 24   BILIRUBIN 0.3   ALK PHOS 114                 Lab 11/22/22 1945   IRON 29*   IRON SATURATION 5*   TIBC 623*   TRANSFERRIN 418*   FERRITIN 41.99   FOLATE 12.30   VITAMIN B 12 713         Brief Urine Lab Results  (Last result in the past 365 days)      Color   Clarity   Blood   Leuk Est   Nitrite   Protein   CREAT   Urine HCG        11/22/22 2145 Yellow   Clear   Small (1+)   Negative   Negative   30 mg/dL (1+)               Microbiology Results (last 10 days)     ** No results found for the last 240 hours. **          CT Head Without Contrast    Result Date: 11/22/2022  Impression: 1.     No acute intracranial abnormality is identified. 2.     Postoperative changes from left craniotomy/meningioma resection.  Electronically Signed By-Keeley Simms MD On:11/22/2022 9:42 PM This report was finalized on [id number] by  Keeley Simms MD.    MRI Brain With & Without Contrast    Result Date: 11/23/2022  Impression: 1. Left frontal temporal craniotomy for previous meningioma resection. No pathologic enhancement is seen, and there is no indication of tumor recurrence.  Electronically Signed By-Sonia Souza MD  On:11/23/2022 9:11 AM This report was finalized on [id number] by  Sonia Souza MD.                  Labs Pending at Discharge:      Procedures Performed           Consults:   Consults     Date and Time Order Name Status Description    11/22/2022 10:20 PM Inpatient Neurology Consult General Completed     11/22/2022 10:10 PM Hospitalist (on-call MD unless specified)              Discharge Details        Discharge Medications      Continue These Medications      Instructions Start Date   albuterol sulfate  (90 Base) MCG/ACT inhaler  Commonly known as: PROVENTIL HFA;VENTOLIN HFA;PROAIR HFA   2 puffs, Inhalation, Every 4 Hours PRN      levETIRAcetam 500 MG tablet  Commonly known as: KEPPRA   500 mg, Oral, 2 Times Daily      omeprazole 20 MG capsule  Commonly known as: priLOSEC   20 mg, Oral, Daily             No Known Allergies      Discharge Disposition: Home with OP follow up  Home or Self Care    Diet:  Hospital:  Diet Order   Procedures   • Diet: Regular/House Diet; Texture: Regular Texture (IDDSI 7); Fluid Consistency: Thin (IDDSI 0)         Discharge Activity:         CODE STATUS:  Code Status and Medical Interventions:   Ordered at: 11/22/22 2322     Code Status (Patient has no pulse and is not breathing):    CPR (Attempt to Resuscitate)     Medical Interventions (Patient has pulse or is breathing):    Full Support         No future appointments.        Time spent on Discharge including face to face service:  32 minutes    This patient has been  and discussed with . 11/23/22      Signature: Electronically signed by Stevie Henriquez MD, 11/23/22, 14:29 EST.  [facility]ist Team

## 2022-11-23 NOTE — CASE MANAGEMENT/SOCIAL WORK
Discharge Planning Assessment   [facility]     Patient Name: Yoan Salas  MRN: [id number]  Today's Date: 11/23/2022    Admit Date: 11/22/2022    Plan: home   Discharge Needs Assessment     Row Name 11/23/22 1319       Living Environment    People in Home spouse    Current Living Arrangements home    Primary Care Provided by self    Provides Primary Care For no one    Able to Return to Prior Arrangements yes       Resource/Environmental Concerns    Resource/Environmental Concerns none       Transition Planning    Patient/Family Anticipates Transition to home with family    Patient/Family Anticipated Services at Transition none    Transportation Anticipated family or friend will provide       Discharge Needs Assessment    Readmission Within the Last 30 Days no previous admission in last 30 days    Equipment Currently Used at Home none    Concerns to be Addressed denies needs/concerns at this time;no discharge needs identified               Discharge Plan     Row Name 11/23/22 1319       Plan    Plan home    Plan Comments Met with Patient at bedside Lives at home with spouse. IADL's PCP and Pharmacy verified, denied any needs              Continued Care and Services - Admitted Since 11/22/2022    Coordination has not been started for this encounter.       Expected Discharge Date and Time     Expected Discharge Date Expected Discharge Time    Nov 24, 2022          Demographic Summary     Row Name 11/23/22 1318       General Information    Admission Type observation    Arrived From emergency department    Referral Source admission list    Reason for Consult discharge planning    Preferred Language English               Functional Status     Row Name 11/23/22 1318       Functional Status    Usual Activity Tolerance good    Current Activity Tolerance good       Functional Status, IADL    Medications independent    Meal Preparation independent    Housekeeping independent    Laundry independent    Shopping independent        Mental Status    General Appearance WDL WDL       Mental Status Summary    Recent Changes in Mental Status/Cognitive Functioning no changes                         Lay Muhammad, RN

## 2022-11-23 NOTE — ED PROVIDER NOTES
Subjective   History of Present Illness  Patient presents with:  Seizures    Christa Padron PA   No LMP for male patient.  Patient is a 42-year-old male presents the ED via EMS, for possible seizure.  Patient actually reports he does not remember what happened.  There is no family the bedside at this time.  She denies any history of seizures in the past.  Patient has not been feeling unwell otherwise.  No fever chills.        Review of Systems   Constitutional: Negative for chills and fever.   Respiratory: Negative for shortness of breath.    Cardiovascular: Negative for chest pain.   Gastrointestinal: Negative for abdominal pain.   Musculoskeletal: Negative for back pain and neck pain.   Skin: Negative for color change and rash.   Neurological: Positive for seizures and headaches. Negative for dizziness, tremors, syncope, facial asymmetry, speech difficulty, weakness, light-headedness and numbness.   Psychiatric/Behavioral: Negative for confusion.       Past Medical History:   Diagnosis Date   • Allergic    • Anxiety    • Asthma    • Cancer (HCC)     ALL as child    • Class 3 severe obesity with body mass index (BMI) of 40.0 to 44.9 in adult 11/2/2021   • Depression    • GERD (gastroesophageal reflux disease)    • Hypertension    • Leukemia in remission, childhood; S/P Radiation        No Known Allergies    Past Surgical History:   Procedure Laterality Date   • CRANIOTOMY FOR TUMOR Left 11/2/2021    Procedure: CRANIOTOMY FOR RESECTION OF LEFT PARIETAL TUMOR;  Surgeon: Francois Jean MD;  Location: [facility] MAIN OR;  Service: Neurosurgery;  Laterality: Left;   • HERNIA REPAIR      6 MONTHS OLD   • PORTACATH PLACEMENT      CHILDHOOD       Family History   Problem Relation Age of Onset   • Cancer Maternal Grandfather        Social History     Socioeconomic History   • Marital status:    Tobacco Use   • Smoking status: Never   • Smokeless tobacco: Never   Vaping Use   • Vaping Use: Never used   Substance  "and Sexual Activity   • Alcohol use: Yes     Comment: Weekends   • Drug use: Never   • Sexual activity: Defer           Objective   Physical Exam  Vitals and nursing note reviewed.   HENT:      Head: Normocephalic.      Mouth/Throat:      Mouth: Mucous membranes are moist.      Pharynx: Oropharynx is clear.     Eyes:      Extraocular Movements: Extraocular movements intact.      Conjunctiva/sclera: Conjunctivae normal.      Pupils: Pupils are equal, round, and reactive to light.   Cardiovascular:      Rate and Rhythm: Normal rate and regular rhythm.      Heart sounds: Normal heart sounds. No murmur heard.    No friction rub. No gallop.   Pulmonary:      Effort: Pulmonary effort is normal.      Breath sounds: Normal breath sounds.   Abdominal:      General: Bowel sounds are normal.      Palpations: Abdomen is soft.   Musculoskeletal:         General: Normal range of motion.      Cervical back: Normal range of motion and neck supple.   Skin:     General: Skin is warm and dry.      Capillary Refill: Capillary refill takes less than 2 seconds.   Neurological:      Mental Status: He is alert and oriented to person, place, and time.   Psychiatric:         Behavior: Behavior is cooperative.         Procedures           ED Course  ED Course as of 11/23/22 0228 Tue Nov 22, 2022 2221 Discussed with hospital [LB]   2222 EKG interpreted per ED physician, viewed by me.  Our findings are: Sinus tachycardia rate 101.  Compared to previous 11/5/2021.  No significant [LB]   2225 Hospitalist called back stating they would like neurology consulted for new onset seizure.  [LB]      ED Course User Index  [LB] Marybeth Page [facility], APRN            /97   Pulse 105   Temp 98 °F (36.7 °C) (Oral)   Resp 16   Ht 182.9 cm (72\")   Wt 118 kg (260 lb)   SpO2 96%   BMI 35.26 kg/m²   Labs Reviewed   COMPREHENSIVE METABOLIC PANEL - Abnormal; Notable for the following components:       Result Value    Glucose 123 (*)     CO2 20.0 " (*)     Anion Gap 19.0 (*)     All other components within normal limits    Narrative:     GFR Normal >60  Chronic Kidney Disease <60  Kidney Failure <15     URINALYSIS W/ MICROSCOPIC IF INDICATED (NO CULTURE) - Abnormal; Notable for the following components:    Ketones, UA Trace (*)     Blood, UA Small (1+) (*)     Protein, UA 30 mg/dL (1+) (*)     All other components within normal limits   URINE DRUG SCREEN - Abnormal; Notable for the following components:    THC, Screen, Urine Positive (*)     All other components within normal limits    Narrative:     Negative Thresholds Per Drugs Screened:    Amphetamines                 500 ng/ml  Barbiturates                 200 ng/ml  Benzodiazepines              100 ng/ml  Cocaine                      300 ng/ml  Methadone                    300 ng/ml  Opiates                      300 ng/ml  Oxycodone                    100 ng/ml  THC                           50 ng/ml    The Normal Value for all drugs tested is negative. This report includes final unconfirmed screening results to be used for medical treatment purposes only. Unconfirmed results must not be used for non-medical purposes such as employment or legal testing. Clinical consideration should be applied to any drug of abuse test, particularly when unconfirmed results are used.          All urine drugs of abuse requests without chain of custody are for medical screening purposes only.  False positives are possible.     CBC WITH AUTO DIFFERENTIAL - Abnormal; Notable for the following components:    Hemoglobin 12.0 (*)     MCV 76.9 (*)     MCH 24.3 (*)     RDW 17.3 (*)     Lymphocyte % 19.3 (*)     Monocyte % 4.8 (*)     All other components within normal limits   URINALYSIS, MICROSCOPIC ONLY - Abnormal; Notable for the following components:    RBC, UA 0-2 (*)     WBC, UA 0-2 (*)     All other components within normal limits   ETHANOL    Narrative:     Plasma Ethanol Clinical Symptoms:    ETOH (%)                Clinical Symptom  .01-.05              No apparent influence  .03-.12              Euphoria, Diminished judgment and attention   .09-.25              Impaired comprehension, Muscle incoordination  .18-.30              Confusion, Staggered gait, Slurred speech  .25-.40              Markedly decreased response to stimuli, unable to stand or                        walk, vomitting, sleep or stupor  .35-.50              Comatose, Anesthesia, Subnormal body temperature       BASIC METABOLIC PANEL   CBC AND DIFFERENTIAL    Narrative:     The following orders were created for panel order CBC & Differential.  Procedure                               Abnormality         Status                     ---------                               -----------         ------                     CBC Auto Differential[[id number]]        Abnormal            Final result                 Please view results for these tests on the individual orders.   EXTRA TUBES    Narrative:     The following orders were created for panel order Extra Tubes.  Procedure                               Abnormality         Status                     ---------                               -----------         ------                     Gold Top - SST[[id number]]                                   Final result               Light Blue Top[[id number]]                                   Final result                 Please view results for these tests on the individual orders.   GOLD TOP - SST   LIGHT BLUE TOP     Medications   acetaminophen (TYLENOL) tablet 650 mg (has no administration in time range)   sodium chloride 0.9 % flush 10 mL (10 mL Intravenous Not Given 11/23/22 0018)   sodium chloride 0.9 % flush 10 mL (has no administration in time range)   sodium chloride 0.9 % infusion 40 mL (has no administration in time range)   Enoxaparin Sodium (LOVENOX) syringe 40 mg (has no administration in time range)   sodium chloride 0.9 % bolus 500 mL (0 mL Intravenous Stopped  11/22/22 1125)   levETIRAcetam in NaCl 0.75% (KEPPRA) IVPB 1,000 mg (0 mg Intravenous Stopped 11/22/22 0373)   acetaminophen (TYLENOL) tablet 1,000 mg (1,000 mg Oral Given 11/22/22 2255)     CT Head Without Contrast    Result Date: 11/22/2022  1.     No acute intracranial abnormality is identified. 2.     Postoperative changes from left craniotomy/meningioma resection.  Electronically Signed By-Keeley Simms MD On:11/22/2022 9:42 PM This report was finalized on [id number] by  Keeley Simms MD.                                    MDM  Number of Diagnoses or Management Options     Amount and/or Complexity of Data Reviewed  Clinical lab tests: reviewed  Tests in the radiology section of CPT®: reviewed  Tests in the medicine section of CPT®: reviewed  Discuss the patient with other providers: yes (Hospital)    Patient Progress  Patient progress: stable      Appropriate PPE worn during patient interactions.   Differentials: Electrolyte imbalance, arrhythmia, mass  This is not an all inclusive list of diagnosis considered.     Patient was brought back to the emergency department room for evaluation and placed on appropriate monitoring.  Patient had IV established and blood work obtained.  Presented for evaluation of seizure.  Initially there was no other family at bedside to assist in providing the history, his wife later arrived and reported he had had hand shaking, and then progressed to a generalized seizure per her report, the patient did sustain a tongue laceration, no active bleeding, no indication for repair.  CT head revealed no acute changes.  Lab work unremarkable.  Patient reports he is not on Keppra any longer, I consulted with neurology, they advised continuation of Keppra at this time, MRI in the morning, and patient will be admitted for further evaluation.    Patient had no further seizures while here in the ED.    Disposition: I discussed with the patient their test results, work-up here in the  emergency department, and need for admission and further evaluation. Patient is agreeable to the plan of care. At time of disposition patients VS are reviewed, and patient without acute distress.  Opportunity was provided for questions at the bedside, all questions and concerns were addressed.    Note Disclaimer: At [facility], we believe that sharing information builds trust and better relationships. You are receiving this note because you recently visited [facility]. It is possible you will see health information before a provider has talked with you about it. This kind of information can be easy to misunderstand. To help you fully understand what it means for your health, we urge you to discuss this note with your provider.  Note dicatated utilizing Dragon Dictation.     Final diagnoses:   Seizure-like activity (HCC)       ED Disposition  ED Disposition     ED Disposition   Decision to Admit    Condition   --    Comment   Level of Care: Telemetry [5]   Diagnosis: Seizure-like activity (HCC) [307820]   Admitting Physician: DEANNE PRESTON [[id number]]   Attending Physician: DEANNE PRESTON [[id number]]   Bed Request Comments: CHRISTEN               No follow-up provider specified.       Medication List      No changes were made to your prescriptions during this visit.          Marybeth Page, APRN  11/23/22 0228

## 2022-11-23 NOTE — OUTREACH NOTE
Prep Survey    Flowsheet Row Responses   [facility] patient discharged from? [facility]   Is LACE score < 7 ? Yes   Emergency Room discharge w/ pulse ox? No   Eligibility [facility]   Date of Admission 11/22/22   Date of Discharge 11/23/22   Discharge Disposition Home or Self Care   Discharge diagnosis Seizure-like activity    Does the patient have one of the following disease processes/diagnoses(primary or secondary)? Other   Does the patient have Home health ordered? No   Is there a DME ordered? No   Prep survey completed? Yes          EMMANUEL JIMENES - Registered Nurse

## 2022-11-23 NOTE — H&P
[facility] Medicine Services  History & Physical    Patient Name: Yoan Salas  : 1980  MRN: [id number]  Primary Care Physician:  Christa Padron PA  Date of admission: 2022  Date and Time of Service: 22 at 2300    Subjective      Chief Complaint: new onset seizure    History of Present Illness: Yoan Salas is a 42 y.o. male with past medical history of craniotomy on November 3, 2021 for benign meningioma resection ( took prophylactic keppra for 3 mo following surgery) , GERD, and takes CBD and THC for reports of PTSD and trauma who presented to [facility] on 2022 complaining of seizure.    Patient and spouse in room to give report.  Patient reports last remembers standing at the sink doing dishes with his spouse.  Spouse reports he suddenly went into a blank stare and started shaking all over and she assisted him to lay down on the ground on his right side.  He was unable to follow commands but eyes were open and not responding.  EMS was called.  She noted blood coming from his mouth as he had bit his tongue.  When EMS arrived he was still unresponsive but began following commands and return to baseline once in the ER.      Review of Systems   Constitutional: Positive for malaise/fatigue.   HENT:        Tongue laceration   Eyes: Negative.    Cardiovascular:        Tachycardia   Respiratory: Negative.    Skin: Negative.    Musculoskeletal: Positive for myalgias.   Gastrointestinal: Negative.    Neurological: Positive for headaches and seizures.   Psychiatric/Behavioral: The patient is nervous/anxious.         CBD and THC use         Personal History     Past Medical History:   Diagnosis Date   • Allergic    • Anxiety    • Asthma    • Cancer (HCC)     ALL as child    • Class 3 severe obesity with body mass index (BMI) of 40.0 to 44.9 in adult 2021   • Depression    • GERD (gastroesophageal reflux disease)    • Hypertension    • Leukemia in remission,  childhood; S/P Radiation        Past Surgical History:   Procedure Laterality Date   • CRANIOTOMY FOR TUMOR Left 11/2/2021    Procedure: CRANIOTOMY FOR RESECTION OF LEFT PARIETAL TUMOR;  Surgeon: Francois Jean MD;  Location: [facility] MAIN OR;  Service: Neurosurgery;  Laterality: Left;   • HERNIA REPAIR      6 MONTHS OLD   • PORTACATH PLACEMENT      CHILDHOOD       Family History: family history includes Cancer in his maternal grandfather. Otherwise pertinent FHx was reviewed and not pertinent to current issue.    Social History:  reports that he has never smoked. He has never used smokeless tobacco. He reports current alcohol use. He reports that he does not use drugs.    Home Medications:  Prior to Admission Medications     Prescriptions Last Dose Informant Patient Reported? Taking?    albuterol sulfate  (90 Base) MCG/ACT inhaler   No No    Inhale 2 puffs Every 4 (Four) Hours As Needed for Wheezing or Shortness of Air.    amLODIPine (NORVASC) 5 MG tablet   No No    Take 1 tablet by mouth Daily.    amLODIPine (Norvasc) 5 MG tablet   Yes No    Take 5 mg by mouth Daily.    cefdinir (OMNICEF) 300 MG capsule   Yes No    Take 300 mg by mouth 2 (Two) Times a Day.    dexamethasone (DECADRON) 2 MG tablet   No No    Take 1 tablet by mouth 4 times daily for 3 days, twice daily for 3 days, once daily for 3 days, stop    famotidine (PEPCID) 20 MG tablet   Yes No    Take 20 mg by mouth 2 (Two) Times a Day. Take DOS    levETIRAcetam (KEPPRA) 500 MG tablet   No No    Take 1 tablet by mouth 2 (Two) Times a Day.    omeprazole (priLOSEC) 20 MG capsule   Yes No    Take 20 mg by mouth Daily.    oxyCODONE-acetaminophen (Percocet) 5-325 MG per tablet   No No    Take 1 tablet by mouth Every 4 (Four) Hours As Needed for Severe Pain .    sodium chloride 1 g tablet   No No    Take 1 tablet by mouth 3 (Three) Times a Day With Meals.    sucralfate (CARAFATE) 1 g tablet   No No    TAKE ONE TABLET BY MOUTH FOUR TIMES A DAY     "        Allergies:  No Known Allergies    Objective      Vitals:   Temp:  [98 °F (36.7 °C)] 98 °F (36.7 °C)  Heart Rate:  [100-134] 102  Resp:  [16-18] 16  BP: (127-165)/(83-99) 140/85    Physical Exam  Constitutional:       Appearance: Normal appearance.   HENT:      Head: Normocephalic.      Nose:      Comments: Tongue laceration healing back left. No bleeding      Mouth/Throat:      Mouth: Mucous membranes are moist.   Eyes:      Pupils: Pupils are equal, round, and reactive to light.   Cardiovascular:      Rate and Rhythm: Tachycardia present.      Pulses: Normal pulses.      Heart sounds: Normal heart sounds.   Abdominal:      General: Abdomen is flat.      Palpations: Abdomen is soft.   Musculoskeletal:         General: Normal range of motion.      Cervical back: Normal range of motion and neck supple.   Skin:     General: Skin is warm and dry.   Neurological:      General: No focal deficit present.      Mental Status: He is alert and oriented to person, place, and time.   Psychiatric:         Mood and Affect: Mood normal.         Behavior: Behavior normal.         Thought Content: Thought content normal.         Judgment: Judgment normal.          Result Review    Result Review:  I have personally reviewed the results from the time of this admission to 11/22/2022 23:25 EST and agree with these findings:  [x]  Laboratory  [x]  Microbiology  []  Radiology  []  EKG/Telemetry   []  Cardiology/Vascular   []  Pathology  []  Old records  []  Other:  Most notable findings include: CO2 20, Anion gap 19      Assessment & Plan        Active Hospital Problems:  Active Hospital Problems    Diagnosis    • **Seizure-like activity (HCC)      Plan:   New onset seizure  - Neurology consult  -start Keppra  -MRI     Hx craniotomy with benign meningioma [id number]  - completed 3mo prophylactic keppra 500bid X 3mo with no sign seizure    [place]achache  -tylenol prn    Substance abuse  - takes CBD and THC routinely for \"PTSD " "/Trauma\"          DVT prophylaxis:  Medical DVT prophylaxis orders are present.    CODE STATUS:    Code Status (Patient has no pulse and is not breathing): CPR (Attempt to Resuscitate)  Medical Interventions (Patient has pulse or is breathing): Full Support    Admission Status:  I believe this patient meets observation  status.    I discussed the patient's findings and my recommendations with patient and family.    This patient has been examined wearing appropriate Personal Protective Equipment     Signature: Electronically signed by HI Penaloza, 11/22/22, 23:25 EST.  [facility] Hospitalist Team    "

## 2022-11-25 ENCOUNTER — TRANSITIONAL CARE MANAGEMENT TELEPHONE ENCOUNTER (OUTPATIENT)
Dept: CALL CENTER | Facility: HOSPITAL | Age: 42
End: 2022-11-25

## 2022-11-25 LAB — QT INTERVAL: 337 MS

## 2022-11-25 NOTE — OUTREACH NOTE
Call Center TCM Note    Flowsheet Row Responses   [facility] patient discharged from? [facility]   Does the patient have one of the following disease processes/diagnoses(primary or secondary)? Other   TCM attempt successful? Yes   Call start time 1203   Call end time 1206   Discharge diagnosis Seizure-like activity    Person spoke with today (if not patient) and relationship Patient   Meds reviewed with patient/caregiver? Yes   Is the patient having any side effects they believe may be caused by any medication additions or changes? No   Does the patient have all medications ordered at discharge? Yes   Is the patient taking all medications as directed (includes completed medication regime)? Yes   Does the patient have an appointment with their PCP within 7 days of discharge? No appointments available   Nursing Interventions Routed TCM call to PCP office   Psychosocial issues? No   Did the patient receive a copy of their discharge instructions? Yes   Nursing interventions Reviewed instructions with patient   What is the patient's perception of their health status since discharge? Improving   Is the patient/caregiver able to teach back signs and symptoms related to disease process for when to call PCP? Yes   Is the patient/caregiver able to teach back signs and symptoms related to disease process for when to call 911? Yes   Is the patient/caregiver able to teach back the hierarchy of who to call/visit for symptoms/problems? PCP, Specialist, Home health nurse, Urgent Care, ED, 911 Yes   If the patient is a current smoker, are they able to teach back resources for cessation? Not a smoker   TCM call completed? Yes   Wrap up additional comments Pt states he is tired, and sore from previous seizure on 11/22/22. Pt verbalized other than the soreness/being tired he is doing ok. RN will send a message to PCP office to help pt make a hospital fu appt as there are no appts available that satisfy TCM guidelines..   Call end time  1206   Would this patient benefit from a Referral to [facility] Social Work? No   Is the patient interested in additional calls from an ambulatory ?  NOTE:  applies to high risk patients requiring additional follow-up. No          Adelia Gleason RN    11/25/2022, 12:10 EST

## 2022-11-30 ENCOUNTER — TELEPHONE (OUTPATIENT)
Dept: FAMILY MEDICINE CLINIC | Facility: CLINIC | Age: 42
End: 2022-11-30

## 2022-11-30 NOTE — TELEPHONE ENCOUNTER
Caller: Yoan Salas    Relationship: Self    Best call back number: [phone]    PATIENT CALLED STATING HE HAD A SEIZURE LAST WEEK. WENT TO THE HOSPITAL HAD A MRI, THAT CAME BACK CLEAN, WASN'T SURE WHAT CAUSED IT.     THE HOSPITAL PUT HIM ON A GENERIC VERSION, ANTI SEIZURE MEDICATION- KEPPRA       PATIENT WANTED TO TALK WITH EMMANUEL OVER THE PHONE ABOUT ALL THIS.    PLEASE ADVISE

## 2022-12-01 DIAGNOSIS — R56.9 NEW ONSET SEIZURE: Primary | ICD-10-CM

## 2022-12-01 RX ORDER — LEVETIRACETAM 500 MG/1
500 TABLET ORAL 2 TIMES DAILY
Qty: 180 TABLET | Refills: 1 | Status: SHIPPED | OUTPATIENT
Start: 2022-12-01

## 2022-12-01 NOTE — TELEPHONE ENCOUNTER
Spoke with pt and discussed new onset seizures.  He is taking Keppra and denies any seizures recent one prior to going to ER.  Will put in referral to see Dr. Seipel and pt will let me know if any seizures occur and if any refills needed.  He is aware that he is not to drive or operate heavy machinery until seizure free for 3 months.

## 2022-12-06 ENCOUNTER — TELEPHONE (OUTPATIENT)
Dept: FAMILY MEDICINE CLINIC | Facility: CLINIC | Age: 42
End: 2022-12-06

## 2022-12-06 NOTE — TELEPHONE ENCOUNTER
Caller: Yoan Salas    Relationship to patient: Self    Best call back number:      Patient is needing: PATIENT STATES HE WAS PLACED ON KEPPRA SINCE HAVING A SEIZURE 2 WEEKS AGO.  PATIENT WOULD LIKE A CALL BACK FROM EMMANUEL TO DISCUSS AND CONFIRM THE SIDE EFFECTS HE IS EXPERIENCING ARE A RESULT OF THE MEDICATION, AND HE WOULD LIKE TO KNOW IF THEY WILL DISSIPATE WITH TIME.  HE STATES HE IS EMOTIONAL, CRYING, UPSET - A LITTLE DEPRESSED, BUT NOT SUICIDAL.  HE IS A LITTLE TIRED.   HE READ ABOUT THE MEDICATION AND STATES THAT IS SOMEWHAT NORMAL, BUT IT IS EFFECTING HIM  MORE THIS WEEK, THAN IT WAS THE FIRST WEEK.  HE JUST WANTS TO MAKE SURE THAT THIS WILL LEVEL OUT, AND HOW LONG IT TAKES FOR IT TO LEVEL OUT.  HE WOULD LIKE TO KNOW IF THERE IS ANOTHER EFFECTIVE MEDICATION HE CAN CHANGE OVER TO IF KEPPRA DOES NOT WORK FOR HIM.    PLEASE ADVISE.

## 2022-12-07 NOTE — TELEPHONE ENCOUNTER
PATIENT CALLED AND STATES HE IS FEELING BETTER TODAY. HIS MAIN CONCERN IS WANTING TO KNOW WHEN HE WOULD BE ABLE TO START TO DRIVE AGAIN. HE HAS BEEN ON KEPPRA. FOR TWO WEEKS. HE FEELS CONFIDENT TO DRIVE WITH THE MEDICATION. HE STATES IT IS A HARDSHIP NOT BEING ABLE TO DRIVE TO WORK. HIS WIFE IS DRIVING HIM BUT HAS ISSUES DRIVING, SHE HAS PANIC ATTACKS.     HIS MRI WAS CLEAN ON 11/23/22 AFTER HAVING SEIZURE.  HE WANTS TO KNOW IF HE CAN LEGALLY DRIVE BEING ON KEPPRA. HE HAS NOT FOUND ANYTHING ON THE [facility] WEBSITE.    PLEASE CALL AND ADVISE  [phone]

## 2022-12-12 PROBLEM — C91.00 ALL (ACUTE LYMPHOCYTIC LEUKEMIA): Status: ACTIVE | Noted: 2022-12-12

## 2022-12-12 NOTE — PROGRESS NOTES
Subjective: New onset seizure     Patient ID: Yoan Salas is a 42 y.o. male.    CHIEF COMPLAINT:New onset seizure     History of Present Illness Mr Salas is a very pleasant 42-year-old  male with a BMI of 36.48 who presented with his wife referred as a new patient by Christa Padron for new onset seizure.  The patient reports that he underwent craniotomy for meningioma resection on November 2, 2021.  He states he was placed on Keppra for about a month after surgery then it was discontinued.  He states he did not have any seizures until 11/22/2022.  He states he was standing at the sink doing dishes and suddenly went into a blank stare, had tremors all over laying down on his right side was unable to follow commands, eyes were open but unresponsive until he was brought by EMS to the ER.  Keppra was restarted.    The patient states that he is having personality changes with Keppra.  His wife states he is very irritable.  Both of them would like to continue on a seizure medication but however would like to change it to a different one.  I suggested Depakote as it does have a mood stabilization feature.  They were both interested in this medication.  The patient has not had an EEG and will be scheduled for 1.  The patient's recent MRI of the brain which was completed 11/23/2022 was normal and showed the area for the left frontal craniotomy.      The patient was given the following titration schedule:    Week #1: Keppra 500 mg twice daily, Depakote  nightly  Week #2: Keppra 500 mg twice daily, Depakote  nightly  Week #3 Keppra 500 mg in the a.m., Depakote   mg at bedtime  Week #4 Depakote ER 1000 mg at bedtime    The patient was notified that he may become sleepy during this titration possibly in a week #2.       Testing:   MRI BRAIN W WO CONTRAST-   Date of Exam: 11/23/2022 8:00 AM   Indication: Seizure disorder, history of tumor; R56.9-Unspecified  convulsions   Comparison: CT head without  contrast 11/22/2022. MRI brain 11/3/2021.  IMPRESSION:  1. Left frontal temporal craniotomy for previous meningioma resection.  No pathologic enhancement is seen, and there is no indication of tumor  recurrence.   Electronically Signed By-Sonia Souza MD On:11/23/2022 9:11 AM      The following portions of the patient's history were reviewed and updated as appropriate: allergies, current medications, past family history, past medical history, past social history, past surgical history and problem list.      Family History   Problem Relation Age of Onset   • Cancer Maternal Grandfather        Past Medical History:   Diagnosis Date   • Allergic    • Anxiety    • Asthma    • Cancer (HCC)     ALL as child    • Class 3 severe obesity with body mass index (BMI) of 40.0 to 44.9 in adult 11/2/2021   • Depression    • GERD (gastroesophageal reflux disease)    • Hypertension    • Leukemia in remission, childhood; S/P Radiation        Social History     Socioeconomic History   • Marital status:    Tobacco Use   • Smoking status: Never   • Smokeless tobacco: Never   Vaping Use   • Vaping Use: Never used   Substance and Sexual Activity   • Alcohol use: Yes     Comment: Weekends   • Drug use: Never   • Sexual activity: Defer         Current Outpatient Medications:   •  divalproex (Depakote ER) 250 MG 24 hr tablet, WK1: Take 1 tab at bedtime, WK2: take 2 tabs at bedtime, Wk3: take 3 tab at bedtime, WK4: take 4 tabs at bedtime, Disp: 120 tablet, Rfl: 5  •  levETIRAcetam (KEPPRA) 500 MG tablet, Take 1 tablet by mouth 2 (Two) Times a Day., Disp: 180 tablet, Rfl: 1  •  omeprazole (priLOSEC) 20 MG capsule, Take 20 mg by mouth Daily., Disp: , Rfl:   •  albuterol sulfate  (90 Base) MCG/ACT inhaler, Inhale 2 puffs Every 4 (Four) Hours As Needed for Wheezing or Shortness of Air., Disp: 18 g, Rfl: 2    Review of Systems   Constitutional: Negative.    HENT: Negative.    Eyes: Negative.    Respiratory: Negative.     Cardiovascular: Negative.    Gastrointestinal: Negative.    Genitourinary: Negative.    Musculoskeletal: Negative.    Neurological: Positive for seizures.   Hematological: Negative.    Psychiatric/Behavioral: Negative.         I have reviewed ROS completed by medical assistant.     Objective:    Neurologic Exam     Mental Status   Oriented to person, place, and time.   Speech: speech is normal     Cranial Nerves   Cranial nerves II through XII intact.     CN III, IV, VI   Pupils are equal, round, and reactive to light.    Gait, Coordination, and Reflexes     Gait  Gait: normal    Coordination   Finger to nose coordination: normal  Tandem walking coordination: normal    Reflexes   Right brachioradialis: 1+  Left brachioradialis: 1+  Right biceps: 1+  Left biceps: 1+  Right triceps: 1+  Left triceps: 1+  Right patellar: 2+  Left patellar: 2+  Right achilles: 2+  Left achilles: 2+      Physical Exam  Vitals and nursing note reviewed.   Constitutional:       Appearance: Normal appearance. He is overweight.      Interventions: Face mask in place.   HENT:      Head: Normocephalic and atraumatic.      Right Ear: Hearing normal.      Left Ear: Hearing normal.      Nose: Nose normal.      Mouth/Throat:      Lips: Pink.      Mouth: Mucous membranes are moist.   Eyes:      General: Lids are normal. No visual field deficit.     Extraocular Movements: Extraocular movements intact.      Right eye: Normal extraocular motion and no nystagmus.      Left eye: Normal extraocular motion and no nystagmus.      Pupils: Pupils are equal, round, and reactive to light.   Cardiovascular:      Rate and Rhythm: Normal rate and regular rhythm.      Pulses: Normal pulses.      Heart sounds: Normal heart sounds, S1 normal and S2 normal.   Pulmonary:      Effort: Pulmonary effort is normal.      Breath sounds: Normal breath sounds and air entry.   Musculoskeletal:         General: Normal range of motion.      Cervical back: Full passive range of  motion without pain and normal range of motion.      Comments: 5/5 all extremities including bilateral , dorsiflexion and plantar flexion of feet, no clonus   Skin:     General: Skin is warm and dry.   Neurological:      General: No focal deficit present.      Mental Status: He is alert and oriented to person, place, and time.      Cranial Nerves: Cranial nerves 2-12 are intact. No cranial nerve deficit, dysarthria or facial asymmetry.      Sensory: Sensation is intact. No sensory deficit.      Motor: Motor function is intact. No weakness, tremor, atrophy, abnormal muscle tone, seizure activity or pronator drift.      Coordination: Coordination is intact. Romberg sign negative. Coordination normal. Finger-Nose-Finger Test and Heel to Shin Test normal. Rapid alternating movements normal.      Gait: Gait is intact. Gait and tandem walk normal.      Deep Tendon Reflexes:      Reflex Scores:       Tricep reflexes are 1+ on the right side and 1+ on the left side.       Bicep reflexes are 1+ on the right side and 1+ on the left side.       Brachioradialis reflexes are 1+ on the right side and 1+ on the left side.       Patellar reflexes are 2+ on the right side and 2+ on the left side.       Achilles reflexes are 2+ on the right side and 2+ on the left side.  Psychiatric:         Attention and Perception: Attention and perception normal.         Mood and Affect: Mood normal.         Speech: Speech normal.         Behavior: Behavior is cooperative.         Assessment/Plan:  The patient was told to observe all seizure precautions, including, but not limited to:   If a Seizures occurs: No driving until seizure free for more than 3 months- as per State driving regulation / law;   Avoid all high-risk activity, Take showers instead of baths, avoid swimming without observation, Avoid open heat sources, Avoid working at heights, and Avoid engaging in all potentially hazardous activities.   Patient and his wife expressed clear  understanding  He will be titrated off of Keppra and slowly placed on Depakote.  He will be sent for an EEG.    Seizure most likely cause due to craniotomy last year.    Diagnoses and all orders for this visit:    1. Generalized convulsive seizures (HCC) (Primary)  -     Discontinue: divalproex (Depakote ER) 250 MG 24 hr tablet; Take 1 tab at bedtime for 1 week, Week #2 2 tabs at bedtime  Dispense: 30 tablet; Refill: 2  -     divalproex (Depakote ER) 250 MG 24 hr tablet; WK1: Take 1 tab at bedtime, WK2: take 2 tabs at bedtime, Wk3: take 3 tab at bedtime, WK4: take 4 tabs at bedtime  Dispense: 120 tablet; Refill: 5  -     EEG; Future        Return in about 2 months (around 2/13/2023), or Stephanie Lama.    I spent 60 minutes caring for Yoan on this date of service. This time includes time spent by me in the following activities: reviewing tests, obtaining and/or reviewing a separately obtained history, performing a medically appropriate examination and/or evaluation, counseling and educating the patient/family/caregiver, ordering medications, tests, or procedures and documenting information in the medical record.      This document has been electronically signed by Stephanie TERRAZAS on December 13, 2022 11:04 EST

## 2022-12-13 ENCOUNTER — OFFICE VISIT (OUTPATIENT)
Dept: NEUROLOGY | Facility: CLINIC | Age: 42
End: 2022-12-13

## 2022-12-13 VITALS
HEART RATE: 87 BPM | BODY MASS INDEX: 36.44 KG/M2 | TEMPERATURE: 97.6 F | SYSTOLIC BLOOD PRESSURE: 131 MMHG | HEIGHT: 72 IN | WEIGHT: 269 LBS | DIASTOLIC BLOOD PRESSURE: 85 MMHG

## 2022-12-13 DIAGNOSIS — R56.9 GENERALIZED CONVULSIVE SEIZURES: Primary | ICD-10-CM

## 2022-12-13 PROCEDURE — 99215 OFFICE O/P EST HI 40 MIN: CPT | Performed by: NURSE PRACTITIONER

## 2022-12-13 RX ORDER — DIVALPROEX SODIUM 250 MG/1
TABLET, EXTENDED RELEASE ORAL
Qty: 30 TABLET | Refills: 2 | Status: SHIPPED | OUTPATIENT
Start: 2022-12-13 | End: 2022-12-13

## 2022-12-13 RX ORDER — DIVALPROEX SODIUM 250 MG/1
TABLET, EXTENDED RELEASE ORAL
Qty: 120 TABLET | Refills: 5 | Status: SHIPPED | OUTPATIENT
Start: 2022-12-13 | End: 2023-01-18 | Stop reason: SDUPTHER

## 2022-12-14 ENCOUNTER — TELEPHONE (OUTPATIENT)
Dept: NEUROLOGY | Facility: CLINIC | Age: 42
End: 2022-12-14

## 2022-12-14 DIAGNOSIS — R56.9 GENERALIZED CONVULSIVE SEIZURES: Primary | ICD-10-CM

## 2022-12-14 NOTE — TELEPHONE ENCOUNTER
The patient was notified that Depakote can cause tremor when given long-term.  Also notified it can impact white count.  I will order a CBC CMP to be completed in the next 2 months as well as a Depakote level.

## 2023-01-18 DIAGNOSIS — R56.9 GENERALIZED CONVULSIVE SEIZURES: ICD-10-CM

## 2023-01-18 RX ORDER — DIVALPROEX SODIUM 250 MG/1
TABLET, EXTENDED RELEASE ORAL
Qty: 120 TABLET | Refills: 5 | Status: SHIPPED | OUTPATIENT
Start: 2023-01-18 | End: 2023-01-20

## 2023-01-18 NOTE — TELEPHONE ENCOUNTER
Caller: NATHAN Bynum call back number: [phone]    Requested Prescriptions:    DEPAKOTE  1000 MG ? PT STATED YOU WERE GOING TO INCREASE THE PILL TO 1000 MG  SO HE ONLY HAS TO TAKE THE ONE INSTEAD OF 4 (250MG)    Requested Prescriptions      No prescriptions requested or ordered in this encounter        Pharmacy where request should be sent: [id number] - [place], [place] - [address] [place] [address] - [phone]  - [phone]   [phone]     Additional details provided by patient: LOOK AT MG. ON RX SAID SHE WAS CHANGING FROM 250MG TO 1000MG . HE WILL TAKE 1 (1000MG TAB PER NIGHT NOW )     Does the patient have less than a 3 day supply:  [x] Yes  [] No    Would you like a call back once the refill request has been completed: [x] Yes [] No    If the office needs to give you a call back, can they leave a voicemail: [x] Yes [] No    PLEASE ADVISE.     Jeanne Randall Rep   01/18/23 10:45 EST

## 2023-01-19 ENCOUNTER — TELEPHONE (OUTPATIENT)
Dept: NEUROLOGY | Facility: CLINIC | Age: 43
End: 2023-01-19
Payer: COMMERCIAL

## 2023-01-19 DIAGNOSIS — R56.9 GENERALIZED CONVULSIVE SEIZURES: Primary | ICD-10-CM

## 2023-01-19 NOTE — TELEPHONE ENCOUNTER
Caller: NATHAN    Relationship: PATIENT    Best call back number: [phone]    What medications are you currently taking:   Current Outpatient Medications on File Prior to Visit   Medication Sig Dispense Refill   • albuterol sulfate  (90 Base) MCG/ACT inhaler Inhale 2 puffs Every 4 (Four) Hours As Needed for Wheezing or Shortness of Air. 18 g 2   • divalproex (Depakote ER) 250 MG 24 hr tablet take 4 tabs at bedtime 120 tablet 5   • levETIRAcetam (KEPPRA) 500 MG tablet Take 1 tablet by mouth 2 (Two) Times a Day. 180 tablet 1   • omeprazole (priLOSEC) 20 MG capsule Take 20 mg by mouth Daily.       No current facility-administered medications on file prior to visit.          When did you start taking these medications: N/A    Which medication are you concerned about: DIVALPROEX    Who prescribed you this medication: KRIS BARNEY    What are your concerns: PATIENT IS REQUESTING AN RX FOR THE 1000 MG OF DIVALPROEX. PATIENT WAS TOLD BY THE PHARMACY THAT THEY WILL NEED TO ORDER THE MEDICATION, PATIENT WILL BE OUT OF  MG BY Sunday.    PLEASE ADVISE    THANK YOU      How long have you had these concerns: N/A

## 2023-01-20 RX ORDER — DIVALPROEX SODIUM 500 MG/1
1000 TABLET, EXTENDED RELEASE ORAL DAILY
Qty: 180 TABLET | Refills: 3 | Status: SHIPPED | OUTPATIENT
Start: 2023-01-20

## 2023-02-13 ENCOUNTER — TELEPHONE (OUTPATIENT)
Dept: NEUROLOGY | Facility: CLINIC | Age: 43
End: 2023-02-13
Payer: COMMERCIAL

## 2023-02-13 NOTE — TELEPHONE ENCOUNTER
Tell him to call the pharmacy where his medications are and they can check for him.  I do not think it is a problem but he should check with them.

## 2023-02-13 NOTE — TELEPHONE ENCOUNTER
Caller: Yoan Salas    Relationship: Self    Best call back number: [phone]    What medications are you currently taking:   Current Outpatient Medications on File Prior to Visit   Medication Sig Dispense Refill   • albuterol sulfate  (90 Base) MCG/ACT inhaler Inhale 2 puffs Every 4 (Four) Hours As Needed for Wheezing or Shortness of Air. 18 g 2   • divalproex (DEPAKOTE ER) 500 MG 24 hr tablet Take 2 tablets by mouth Daily. 180 tablet 3   • levETIRAcetam (KEPPRA) 500 MG tablet Take 1 tablet by mouth 2 (Two) Times a Day. 180 tablet 1   • omeprazole (priLOSEC) 20 MG capsule Take 20 mg by mouth Daily.       No current facility-administered medications on file prior to visit.          Which medication are you concerned about: DEPAKOTE    Who prescribed you this medication: KRIS BARNEY    What are your concerns: PATIENT CALLED TO SEE PROVIDERS OPINION ON [employer] SUPPLEMENT. WILL THIS HAVE ANY INTERACTION WITH THE DEPAKOTE AND DOES THE PROVIDER HAS ANY CONCERNS WITH PATIENT TAKING THIS SUPPLEMENT?

## 2023-03-27 RX ORDER — ALBUTEROL SULFATE 90 UG/1
AEROSOL, METERED RESPIRATORY (INHALATION)
Qty: 8.5 G | Refills: 0 | Status: SHIPPED | OUTPATIENT
Start: 2023-03-27

## 2023-05-11 NOTE — PROGRESS NOTES
Subjective: seizure    Patient ID: Yoan Salas is a 43 y.o. male.    History of Present Illness Mr. Malhotra is a 43-year-old  male who had presented with his wife previously and is here for a follow-up on epilepsy.  The patient had been placed on Keppra and was having severe mood changes with Keppra.  We discussed several different medication and decided on a trial with Depakote ER at night.  The patient remained on Keppra and slowly was titrated up to Depakote  at bedtime was on that dose for a week Keppra was DC'd and the patient was increased to 1000 mg at bedtime.  He states he has had no breakthrough seizures and he is now on Depakote therapy alone.  The patient is aware of possible tremor and weight gain with this medication.  He states he feels better and is speaking better and is resting better with this medication but would like to go back to a dose of 750 if possible.  I stated this was possible but if he had a breakthrough seizure we would have to go back to the 1000mg.    The patient states he is doing much better cognitively with forming words, speaking.  He states that he occasionally has some trouble with multisyllable words and occasionally does not remember a song.  He states he has done speech therapy and felt it was not beneficial.  He was told to read a book or his songs out loud as much as he can to try to retrain his brain in those areas.  He agreed.     The patient was given the following titration schedule:  Week #1: Keppra 500 mg twice daily, Depakote  nightly  Week #2: Keppra 500 mg twice daily, Depakote  nightly  Week #3 Keppra 500 mg in the a.m., Depakote   mg at bedtime  Week #4 Depakote ER 1000 mg at bedtime         The following portions of the patient's history were reviewed and updated as appropriate: allergies, current medications, past family history, past medical history, past social history, past surgical history and problem list.    Family  History   Problem Relation Age of Onset   • Cancer Maternal Grandfather        Past Medical History:   Diagnosis Date   • Allergic    • Anxiety    • Asthma    • Cancer     ALL as child    • Class 3 severe obesity with body mass index (BMI) of 40.0 to 44.9 in adult 11/2/2021   • Depression    • GERD (gastroesophageal reflux disease)    • Hypertension    • Leukemia in remission, childhood; S/P Radiation        Social History     Socioeconomic History   • Marital status:    Tobacco Use   • Smoking status: Never   • Smokeless tobacco: Never   Vaping Use   • Vaping Use: Never used   Substance and Sexual Activity   • Alcohol use: Yes     Comment: Weekends   • Drug use: Never   • Sexual activity: Defer         Current Outpatient Medications:   •  albuterol sulfate  (90 Base) MCG/ACT inhaler, INHALE TWO PUFFS BY MOUTH EVERY 4 HOURS AS NEEDED FOR WHEEZING OR SHORTNESS OF AIR, Disp: 8.5 g, Rfl: 0  •  omeprazole (priLOSEC) 20 MG capsule, Take 1 capsule by mouth Daily., Disp: , Rfl:   •  divalproex (DEPAKOTE ER) 250 MG 24 hr tablet, Take 3 tablets by mouth Every Night for 360 days., Disp: 270 tablet, Rfl: 3    Review of Systems   HENT: Negative.    Respiratory: Negative.    Cardiovascular: Negative.    Gastrointestinal: Negative.    Endocrine: Negative.    Genitourinary: Negative.    Musculoskeletal: Negative.    Neurological: Negative.    Hematological: Negative.    Psychiatric/Behavioral: Negative.           I have reviewed ROS completed by medical assistant.     Objective:    Neurologic Exam     Cranial Nerves   Cranial nerves II through XII intact.     Gait, Coordination, and Reflexes     Gait  Gait: normal      Physical Exam  Vitals reviewed.   Constitutional:       Appearance: Normal appearance. He is obese.   HENT:      Head: Normocephalic and atraumatic.      Right Ear: Hearing normal.      Left Ear: Hearing normal.      Nose: Nose normal.   Eyes:      General: Lids are normal. No visual field  deficit.  Cardiovascular:      Rate and Rhythm: Normal rate.   Musculoskeletal:         General: Normal range of motion.      Cervical back: Normal range of motion.   Skin:     General: Skin is warm and dry.   Neurological:      Mental Status: He is alert.      Cranial Nerves: Cranial nerves 2-12 are intact. No cranial nerve deficit, dysarthria or facial asymmetry.      Gait: Gait is intact.   Psychiatric:         Attention and Perception: Attention normal.         Mood and Affect: Mood normal.         Behavior: Behavior is cooperative.         Assessment/Plan:  Discussion: The patient will be continued on Depakote  mg nightly.  Should he have any breakthrough seizures he should contact the clinic.  He was encouraged to read aloud to help retrain his brain to certain words and songs.  He will be scheduled back for follow-up visit in 6 months and if he has any problems prior to that time he can call the clinic.  He will also be scheduled for an EEG sleep deprived 2 hours.  The patient was told to observe all seizure precautions, including, but not limited to:   If a Seizures occurs: No driving until seizure free for more than 3 months- as per State driving regulation / law;   Avoid all high-risk activity, Take showers instead of baths, avoid swimming without observation, Avoid open heat sources, Avoid working at heights, and Avoid engaging in all potentially hazardous activities.   Patient expressed clear understanding    Diagnoses and all orders for this visit:    1. Generalized convulsive seizures (Primary)  -     divalproex (DEPAKOTE ER) 250 MG 24 hr tablet; Take 3 tablets by mouth Every Night for 360 days.  Dispense: 270 tablet; Refill: 3  -     Cancel: EEG; Future  -     EEG; Future          Return in about 6 months (around 11/15/2023).     I spent 40 minutes caring for Yoan on this date of service. This time includes time spent by me in the following activities: reviewing tests, obtaining and/or  reviewing a separately obtained history, performing a medically appropriate examination and/or evaluation, ordering medications, tests, or procedures, referring and communicating with other health care professionals and documenting information in the medical record.      This document has been electronically signed by MK Suh on May 15, 2023 17:20 EDT

## 2023-05-15 ENCOUNTER — OFFICE VISIT (OUTPATIENT)
Dept: NEUROLOGY | Facility: CLINIC | Age: 43
End: 2023-05-15
Payer: COMMERCIAL

## 2023-05-15 VITALS
HEART RATE: 97 BPM | SYSTOLIC BLOOD PRESSURE: 113 MMHG | DIASTOLIC BLOOD PRESSURE: 80 MMHG | BODY MASS INDEX: 37.38 KG/M2 | WEIGHT: 276 LBS | HEIGHT: 72 IN

## 2023-05-15 DIAGNOSIS — R56.9 GENERALIZED CONVULSIVE SEIZURES: Primary | ICD-10-CM

## 2023-05-15 RX ORDER — DIVALPROEX SODIUM 250 MG/1
750 TABLET, EXTENDED RELEASE ORAL NIGHTLY
Qty: 270 TABLET | Refills: 3 | Status: SHIPPED | OUTPATIENT
Start: 2023-05-15 | End: 2024-05-09

## 2023-07-06 ENCOUNTER — TELEPHONE (OUTPATIENT)
Dept: FAMILY MEDICINE CLINIC | Facility: CLINIC | Age: 43
End: 2023-07-06

## 2023-07-06 NOTE — TELEPHONE ENCOUNTER
Spoke with patient and tried quitting his omeprazole suddenly since he has lost weight doing keto diet and didn't think he needed it anymore.  He started having really bad reflux over the past week.  Discussed that he should restart it and then try tapering off as discussed but he may find he still needs it once in a while.  He is going to contact me this fall about doing labs around his appointment.

## 2023-07-06 NOTE — TELEPHONE ENCOUNTER
Caller: LowellNathan goss    Relationship: Self    Best call back number: [phone]    Which medication are you concerned about: omeprazole (priLOSEC) 20 MG capsule     Who prescribed you this medication: DR VIZCAINO    What are your concerns: NATHAN HAS WEIGHT LOSS AND WOULD LIKE TO GO OFF OF MEDICATION. PATIENT IS REQUESTING A CALL WITH EMMANUEL OHARA

## 2023-07-31 ENCOUNTER — TELEPHONE (OUTPATIENT)
Dept: NEUROLOGY | Facility: CLINIC | Age: 43
End: 2023-07-31
Payer: COMMERCIAL

## 2023-08-01 DIAGNOSIS — R56.9 GENERALIZED CONVULSIVE SEIZURES: ICD-10-CM

## 2023-08-01 RX ORDER — DIVALPROEX SODIUM 250 MG/1
750 TABLET, EXTENDED RELEASE ORAL NIGHTLY
Qty: 270 TABLET | Refills: 3 | Status: SHIPPED | OUTPATIENT
Start: 2023-08-01 | End: 2024-07-26

## 2023-08-01 RX ORDER — ALBUTEROL SULFATE 90 UG/1
AEROSOL, METERED RESPIRATORY (INHALATION)
Qty: 8.5 G | Refills: 0 | Status: SHIPPED | OUTPATIENT
Start: 2023-08-01

## 2023-10-23 ENCOUNTER — TELEPHONE (OUTPATIENT)
Dept: NEUROLOGY | Facility: CLINIC | Age: 43
End: 2023-10-23

## 2023-10-23 NOTE — TELEPHONE ENCOUNTER
Caller: Yoan Salas    Relationship: Self    Best call back number: [phone]     What is the best time to reach you: ANY    Who are you requesting to speak with (clinical staff, provider,  specific staff member): PROVIDER    What was the call regarding: PATIENT ADVISED PROVIDER IS WANTING HIM TO HAVE 2 EEG'S DONE. HE HAS ONE SCHEDULE ON 1/3/24 & IS ATTEMPTING TO HAVE THEM BOTH COMPLETED IN THE SAME YEAR.     PLEASE CALL TO CLARIFY IF PROVIDER IS STILL WANTING PATIENT TO HAVE TWO (2) EEG'S COMPLETED.    IF 2ND ONE IS NEEDED, PATIENT WILL NEED NEW REFERRAL/ORDER FOR SCHEDULING     PLEASE REVIEW & CALL TO ADVISE-THANK YOU

## 2023-12-12 NOTE — TELEPHONE ENCOUNTER
Caller: Yoan Salas    Relationship: Self    Best call back number: [phone]     Requested Prescriptions:   Requested Prescriptions     Pending Prescriptions Disp Refills   • sucralfate (Carafate) 1 g tablet 28 tablet 0     Sig: Take 1 tablet by mouth 4 (Four) Times a Day.      ALBUTEROL INHALER     Pharmacy where request should be sent: [facility] - [place], [place] - [address] [facility] [phone] [facility] [phone] FX     Additional details provided by patient: PATIENT STATES HE WILL BE OUT TODAY AND IS REQUESTING AN INHALER ALSO     Does the patient have less than a 3 day supply:  [x] Yes  [] No    Jeanne Lynn Rep   11/09/21 11:57 EST       
Lv: 10/18/21    
no

## 2024-02-14 ENCOUNTER — HOSPITAL ENCOUNTER (OUTPATIENT)
Dept: NEUROLOGY | Facility: HOSPITAL | Age: 44
Discharge: HOME | End: 2024-02-14
Payer: COMMERCIAL

## 2024-02-14 DIAGNOSIS — R56.9 GENERALIZED CONVULSIVE SEIZURES: ICD-10-CM

## 2024-02-14 PROCEDURE — 95813 EEG EXTND MNTR 61-119 MIN: CPT

## 2024-02-21 ENCOUNTER — TELEPHONE (OUTPATIENT)
Dept: NEUROLOGY | Facility: CLINIC | Age: 44
End: 2024-02-21
Payer: COMMERCIAL

## 2024-02-21 NOTE — TELEPHONE ENCOUNTER
Caller: Yoan Salas    Relationship: Self    Best call back number: [phone]    Caller requesting test results: SELF    What test was performed: EEG    When was the test performed: 02/14/24    [facility]    Additional notes: PATIENT RECEIVED THE RESULTS BUT HAS SOME QUESTIONS AND ASKS IF HE COULD BE CALLED TO TALK TO SOMEONE REGARDING THE RESULTS AND FOLLOW UP INFO.

## 2024-04-28 ENCOUNTER — HOSPITAL ENCOUNTER (EMERGENCY)
Facility: HOSPITAL | Age: 44
Discharge: HOME | End: 2024-04-28
Attending: EMERGENCY MEDICINE | Admitting: EMERGENCY MEDICINE
Payer: COMMERCIAL

## 2024-04-28 VITALS
WEIGHT: 214 LBS | HEART RATE: 94 BPM | DIASTOLIC BLOOD PRESSURE: 99 MMHG | RESPIRATION RATE: 15 BRPM | OXYGEN SATURATION: 95 % | BODY MASS INDEX: 28.99 KG/M2 | TEMPERATURE: 97.7 F | HEIGHT: 72 IN | SYSTOLIC BLOOD PRESSURE: 155 MMHG

## 2024-04-28 DIAGNOSIS — R56.9 SEIZURE: Primary | ICD-10-CM

## 2024-04-28 DIAGNOSIS — S00.83XA CONTUSION OF FOREHEAD, INITIAL ENCOUNTER: ICD-10-CM

## 2024-04-28 LAB
VALPROATE SERPL-MCNC: 21.6 MCG/ML (ref 50–125)
WHOLE BLOOD HOLD SPECIMEN: NORMAL

## 2024-04-28 PROCEDURE — 96365 THER/PROPH/DIAG IV INF INIT: CPT

## 2024-04-28 PROCEDURE — 99283 EMERGENCY DEPT VISIT LOW MDM: CPT

## 2024-04-28 PROCEDURE — 80164 ASSAY DIPROPYLACETIC ACD TOT: CPT | Performed by: EMERGENCY MEDICINE

## 2024-04-28 RX ORDER — ACETAMINOPHEN 500 MG
1000 TABLET ORAL ONCE
Status: COMPLETED | OUTPATIENT
Start: 2024-04-28 | End: 2024-04-28

## 2024-04-28 RX ADMIN — ACETAMINOPHEN 1000 MG: 500 TABLET, FILM COATED ORAL at 20:11

## 2024-04-28 RX ADMIN — VALPROATE SODIUM 500 MG: 100 INJECTION, SOLUTION INTRAVENOUS at 19:48

## 2024-04-28 NOTE — ED NOTES
Per patient and spouse report spouse heard patient hit something and fall over on desk, spouse reports pt was having a seizure that lasted <5 mins. Pt has h/o seizures after brain tumor removal surgery 3 years ago. Pt noted with abrasion to forehead from hitting head on desk during seizure. Pt A&O  4 at this time.

## 2024-04-28 NOTE — ED PROVIDER NOTES
Subjective   History of Present Illness  44-year-old male presents after seizure today.  He reports he has had history of seizures although have been infrequent.  He does have history of previous craniotomy for meningioma.  He is on Depakote for his seizures.  He states he had been feeling well prior.  No chest pain shortness of breath.  No abdominal pain.  Review of Systems    Past Medical History:   Diagnosis Date    Allergic     Anxiety     Asthma     Cancer     ALL as child     Class 3 severe obesity with body mass index (BMI) of 40.0 to 44.9 in adult 11/02/2021    Depression     GERD (gastroesophageal reflux disease)     Hypertension     Leukemia in remission, childhood; S/P Radiation     Seizures        No Known Allergies    Past Surgical History:   Procedure Laterality Date    CRANIOTOMY FOR TUMOR Left 11/2/2021    Procedure: CRANIOTOMY FOR RESECTION OF LEFT PARIETAL TUMOR;  Surgeon: Francois Jean MD;  Location: [facility] OR;  Service: Neurosurgery;  Laterality: Left;    HERNIA REPAIR      6 MONTHS OLD    PORTACATH PLACEMENT      CHILDHOOD       Family History   Problem Relation Age of Onset    Cancer Maternal Grandfather        Social History     Socioeconomic History    Marital status:    Tobacco Use    Smoking status: Never    Smokeless tobacco: Never   Vaping Use    Vaping status: Never Used   Substance and Sexual Activity    Alcohol use: Yes     Comment: Weekends    Drug use: Never    Sexual activity: Defer     Prior to Admission medications    Medication Sig Start Date End Date Taking? Authorizing Provider   albuterol sulfate  (90 Base) MCG/ACT inhaler INHALE 2 PUFFS BY MOUTH EVERY 4 HOURS AS NEEDED FOR WHEEZING OR SHORTNESS OF AIR 8/1/23   Christa Padron PA   divalproex (DEPAKOTE ER) 250 MG 24 hr tablet Take 3 tablets by mouth Every Night for 360 days. 8/1/23 7/26/24  Stephanie Lama APRN   omeprazole (priLOSEC) 20 MG capsule Take 1 capsule by mouth Daily. 11/23/21    "Provider, Crescencio, MD           Objective   Physical Exam  44-year-old male awake alert.  Generally overweight.  He has noted to have area of erythema to left forehead where he hit his forehead he was at computer desk when seizure started.  Pupils are equal round react light.  No facial asymmetry.  Neck supple chest clear equal breath sounds cardiovascular regular rate and rhythm abdomen soft nontender Neurologic exam without focal findings noted.  Motor or sensory grossly intact to extremities hands without pronator drift speech clear.  Procedures           ED Course      Results for orders placed or performed during the hospital encounter of 04/28/24   Valproic Acid Level, Total    Specimen: Arm, Right; Blood   Result Value Ref Range    Valproic Acid 21.6 (L) 50.0 - 125.0 mcg/mL   Lavender Top   Result Value Ref Range    Extra Tube hold for add-on      No radiology results for the last day  Medications   valproate (DEPACON) 500 mg in dextrose (D5W) 5 % 100 mL IVPB (500 mg Intravenous New Bag 4/28/24 1948)   acetaminophen (TYLENOL) tablet 1,000 mg (1,000 mg Oral Given 4/28/24 2011)     /97   Pulse 103   Temp 97.7 °F (36.5 °C) (Oral)   Resp 15   Ht 182.9 cm (72\")   Wt 97.1 kg (214 lb)   SpO2 93%   BMI 29.02 kg/m²                                          Medical Decision Making  Risk  OTC drugs.      Chart review: Patient had craniotomy November 2021 for large meningioma with mass effect left temporal parietal area.  Patient was noted to have EEG February of this year that was abnormal with changes consistent with breach rhythm in the area of prior craniotomy there was some sharp activity seen in this area with phase reversal at the T4 electrode which may be associated with a seizure disorder of focal cortical origin.  Patient had been admitted with new onset seizure November 2022  Comorbidity: As per past history   Differential: Seizure, subtherapeutic medication,  My EKG interpretation: Not " indicated  Lab: Valproic acid subtherapeutic at 21.6  My Radiology review and interpretation: Not indicated based on normal neurologic exam at this time  Discussion/treatment: Patient's findings were discussed with him and wife.  He did complain of some headache was given Tylenol.  He was given Depacon 500 mg IV.  He reports that he had been on it 1000 mg at 1 time but was causing sedation so decreased dose to 750.  He states he has been stable on 750 for a while and felt that he could go back up to 1000 mg daily.  He reports she is currently taking 750 mg once a day.  Advised to change his dose to 500 mg twice a day  Patient was evaluated using appropriate PPE    Final diagnoses:   Seizure   Contusion of forehead, initial encounter       ED Disposition  ED Disposition       ED Disposition   Discharge    Condition   Stable    Comment   --               Stephanie Lama, APRN  [address] [address]  [phone]               Medication List      No changes were made to your prescriptions during this visit.            Maxime Ramirez MD  04/28/24 0111

## 2024-04-29 ENCOUNTER — TELEPHONE (OUTPATIENT)
Dept: NEUROLOGY | Facility: CLINIC | Age: 44
End: 2024-04-29
Payer: COMMERCIAL

## 2024-04-29 NOTE — DISCHARGE INSTRUCTIONS
Increase Depakote to 500 mg twice a day.  Follow-up with neurologist.  No driving until cleared by neurologist. return as needed

## 2024-04-29 NOTE — TELEPHONE ENCOUNTER
Anesthesia Post-op Note    Patient: Mercedez Cook  Procedure(s) Performed: EGD  COLONOSCOPY  Anesthesia type: MAC    Vitals Value Taken Time   Temp 36.3 °C (97.3 °F) 12/18/23 1407   Pulse 62 12/18/23 1407   Resp 12 12/18/23 1407   SpO2 98 % 12/18/23 1407   /62 12/18/23 1407         Patient Location: Phase II  Post-op Vital Signs:stable  Level of Consciousness: awake and alert  Respiratory Status: spontaneous ventilation and room air  Cardiovascular stable  Hydration: euvolemic  Pain Management: adequately controlled  Handoff: Handoff to receiving clinician was performed and questions were answered  Vomiting: none  Nausea: None  Airway Patency:patent  Post-op Assessment: no complications, patient tolerated procedure well, no evidence of recall, dentition within defined limits, moving all extremities and No Corneal Abrasion      No notable events documented.                       Provider: KRIS BARNEY    Caller: PATIENT    Relationship to Patient: SELF    [facility]: [id number]    Phone Number: [phone]    Reason for Call: PATIENT HAD A SEIZURE YESTERDAY AND WAS SEEN AT  ER. THE ER PROVIDER SUGGESTED TO INCREASE DEPAKOTE TO 1000MG AND SUGGESTED TO CHANGE IT TO 500MG MORNING AND EVENING RATHER THAN JUST NIGHTLY. WOULD LIKE PROVIDER'S OPINION AND TO GET SCRIPT CHANGED ACCORDINGLY. PLEASE REVIEW, THANK YOU.

## 2024-04-30 DIAGNOSIS — R56.9 GENERALIZED CONVULSIVE SEIZURES: ICD-10-CM

## 2024-04-30 RX ORDER — DIVALPROEX SODIUM 500 MG/1
500 TABLET, EXTENDED RELEASE ORAL 2 TIMES DAILY
Qty: 180 TABLET | Refills: 3 | Status: SHIPPED | OUTPATIENT
Start: 2024-04-30 | End: 2025-04-25

## 2024-04-30 NOTE — TELEPHONE ENCOUNTER
I changed the patient to 500 mg twice daily.  I also increase the milligram of pills from 250 to 500 mg so he will be taking 1 pill twice a day.  If he has any further breakthrough seizures he should contact the clinic.

## 2024-06-10 ENCOUNTER — TELEPHONE (OUTPATIENT)
Dept: NEUROLOGY | Facility: CLINIC | Age: 44
End: 2024-06-10
Payer: COMMERCIAL

## 2024-06-10 DIAGNOSIS — R56.9 GENERALIZED CONVULSIVE SEIZURES: ICD-10-CM

## 2024-06-10 RX ORDER — DIVALPROEX SODIUM 500 MG/1
500 TABLET, EXTENDED RELEASE ORAL 2 TIMES DAILY
Qty: 180 TABLET | Refills: 3 | Status: SHIPPED | OUTPATIENT
Start: 2024-06-10 | End: 2025-06-05

## 2024-06-10 NOTE — TELEPHONE ENCOUNTER
Patient called answering service over weekend requesting refill on his Depakote. However, I show no outstanding appts for patient. Ok to fill if he makes appt?

## 2024-06-21 ENCOUNTER — TELEPHONE (OUTPATIENT)
Dept: NEUROLOGY | Facility: CLINIC | Age: 44
End: 2024-06-21
Payer: COMMERCIAL

## 2024-06-21 NOTE — TELEPHONE ENCOUNTER
Caller: Yoan Salas    Relationship: Self    Best call back number: [phone]    What was the call regarding: PATIENT WOKE EARLY @ 5:30 AND THEN AGAIN AT 7:30 AND HE KNOWS AT 7:30 HE TOOK HIS MEDICATION OF HIS DIVALPROEX BUT I CAN'T REMEMBER IF HE TOOK IT WHEN HE WAS AWAKE @ 5:30. HE KNOWS HE IS NOT TO GO OVER THE 1000MG A DAY AND WANTS TO KNOW WHAT THE PROVIDER THINKS HE SHOULD DO FOR THIS EVENING. IF HE SHOULD TAKE HIS NORMAL DOSAGE OR SKIP THIS EVENING. HE STATED HE DOES NOT FEEL ANY DIFFERENT JUST A LITTLE TIRED. HE SIAD HE DOES HAVE 250 MG PILLS AND IF NEEDED HE CAN TAKE ONE OR TWO OF THOSE AS AN OPTION IF ITS SOMETHING THEY THINK HE CAN DO AS WELL     PLEASE ADVISE  THANK YOU     Is it okay if the provider responds through MyChart: YES IF NO ANSWER

## 2024-09-09 RX ORDER — ALBUTEROL SULFATE 90 UG/1
2 AEROSOL, METERED RESPIRATORY (INHALATION) EVERY 4 HOURS PRN
Qty: 8.5 G | Refills: 0 | Status: SHIPPED | OUTPATIENT
Start: 2024-09-09

## 2024-09-09 NOTE — TELEPHONE ENCOUNTER
Caller: Yoan Salas    Relationship: Self    Best call back number: [phone]    Requested Prescriptions:   Requested Prescriptions     Pending Prescriptions Disp Refills    albuterol sulfate  (90 Base) MCG/ACT inhaler 8.5 g 0     Sig: Inhale 2 puffs Every 4 (Four) Hours As Needed for Wheezing or Shortness of Air.      Pharmacy where request should be sent: [facility] [id number] [facility], [place] [address] - [phone] [facility] [phone] FX     Last office visit with prescribing clinician: Visit date not found   Last telemedicine visit with prescribing clinician: Visit date not found   Next office visit with prescribing clinician: Visit date not found     Does the patient have less than a 3 day supply:  [x] Yes  [] No    Would you like a call back once the refill request has been completed: [] Yes [x] No    Jeanne Plummer Rep   09/09/24 11:06 EDT

## 2024-10-28 ENCOUNTER — OFFICE VISIT (OUTPATIENT)
Dept: FAMILY MEDICINE CLINIC | Facility: CLINIC | Age: 44
End: 2024-10-28
Payer: COMMERCIAL

## 2024-10-28 VITALS
DIASTOLIC BLOOD PRESSURE: 99 MMHG | WEIGHT: 245 LBS | HEART RATE: 92 BPM | OXYGEN SATURATION: 97 % | HEIGHT: 72 IN | TEMPERATURE: 97.7 F | SYSTOLIC BLOOD PRESSURE: 139 MMHG | BODY MASS INDEX: 33.18 KG/M2 | RESPIRATION RATE: 20 BRPM

## 2024-10-28 DIAGNOSIS — E66.9 OBESITY WITH BODY MASS INDEX GREATER THAN 30: ICD-10-CM

## 2024-10-28 DIAGNOSIS — I10 ESSENTIAL HYPERTENSION: ICD-10-CM

## 2024-10-28 DIAGNOSIS — J45.20 MILD INTERMITTENT ASTHMA WITHOUT COMPLICATION: ICD-10-CM

## 2024-10-28 DIAGNOSIS — E61.1 IRON DEFICIENCY: ICD-10-CM

## 2024-10-28 DIAGNOSIS — R73.9 HYPERGLYCEMIA: ICD-10-CM

## 2024-10-28 DIAGNOSIS — K21.9 GASTROESOPHAGEAL REFLUX DISEASE, UNSPECIFIED WHETHER ESOPHAGITIS PRESENT: Primary | ICD-10-CM

## 2024-10-28 DIAGNOSIS — Z13.220 SCREENING CHOLESTEROL LEVEL: ICD-10-CM

## 2024-10-28 PROCEDURE — 99213 OFFICE O/P EST LOW 20 MIN: CPT | Performed by: PHYSICIAN ASSISTANT

## 2024-10-28 NOTE — PROGRESS NOTES
Answers submitted by the patient for this visit:  Other (Submitted on 10/27/2024)  Please describe your symptoms.: Not sick. Just a check up.  Have you had these symptoms before?: No  How long have you been having these symptoms?: 1-4 days  Primary Reason for Visit (Submitted on 10/27/2024)  What is the primary reason for your visit?: Problem Not Listed  Subjective   oYan Salas is a 44 y.o. male.     History of Present Illness  Pt presents to follow up on chronic conditions including asthma, GERD, seizures.  He hasn't had any seizures since last time in the hospital.  He is working on more ketogenic diet but he is getting more sodium.  His blood pressure is elevated today but denies any current headache.    He is no longer on CPAP since losing weight and hasn't been having any issues since losing weight. He was up to 290 pounds.  Mainly only having to using albuterol on occasion.           Past Medical History:   Diagnosis Date    Allergic     Anxiety     Asthma     Cancer     ALL as child     Class 3 severe obesity with body mass index (BMI) of 40.0 to 44.9 in adult 11/02/2021    Depression     GERD (gastroesophageal reflux disease)     Hypertension     Leukemia in remission, childhood; S/P Radiation     Seizures      Past Surgical History:   Procedure Laterality Date    BRAIN SURGERY  11/2021    Tumor removal    CRANIOTOMY FOR TUMOR Left 11/02/2021    Procedure: CRANIOTOMY FOR RESECTION OF LEFT PARIETAL TUMOR;  Surgeon: Francois Jean MD;  Location: [facility] OR;  Service: Neurosurgery;  Laterality: Left;    HERNIA REPAIR      6 MONTHS OLD    PORTACATH PLACEMENT      CHILDHOOD     Family History   Problem Relation Age of Onset    Cancer Maternal Grandfather      Social History     Socioeconomic History    Marital status:    Tobacco Use    Smoking status: Never    Smokeless tobacco: Never   Vaping Use    Vaping status: Never Used   Substance and Sexual Activity    Alcohol use: Yes      "Alcohol/week: 10.0 standard drinks of alcohol     Types: 10 Shots of liquor per week     Comment: Weekends    Drug use: Never    Sexual activity: Not Currently         Current Outpatient Medications:     albuterol sulfate  (90 Base) MCG/ACT inhaler, Inhale 2 puffs Every 4 (Four) Hours As Needed for Wheezing or Shortness of Air., Disp: 8.5 g, Rfl: 0    divalproex (DEPAKOTE ER) 500 MG 24 hr tablet, Take 1 tablet by mouth 2 (Two) Times a Day for 360 days. Increased size of pill to 500 mg each, Disp: 180 tablet, Rfl: 3    omeprazole (priLOSEC) 20 MG capsule, Take 1 capsule by mouth Daily., Disp: , Rfl:     Review of Systems  /99 (BP Location: Left arm, Patient Position: Sitting, Cuff Size: Adult)   Pulse 92   Temp 97.7 °F (36.5 °C) (Temporal)   Resp 20   Ht 182.9 cm (72.01\")   Wt 111 kg (245 lb)   SpO2 97%   BMI 33.22 kg/m²       Objective   Physical Exam  Vitals and nursing note reviewed.   Constitutional:       Appearance: Normal appearance. He is obese.   HENT:      Head: Normocephalic and atraumatic.   Eyes:      Conjunctiva/sclera: Conjunctivae normal.      Pupils: Pupils are equal, round, and reactive to light.   Neck:      Thyroid: No thyroid mass, thyromegaly or thyroid tenderness.      Vascular: No carotid bruit.   Cardiovascular:      Rate and Rhythm: Normal rate and regular rhythm.      Heart sounds: Normal heart sounds.   Pulmonary:      Effort: Pulmonary effort is normal.      Breath sounds: Normal breath sounds.   Abdominal:      General: Abdomen is flat.      Palpations: Abdomen is soft.   Musculoskeletal:         General: Normal range of motion.      Cervical back: Normal range of motion and neck supple.      Right lower leg: No edema.      Left lower leg: No edema.   Skin:     General: Skin is warm and dry.   Neurological:      General: No focal deficit present.      Mental Status: He is alert and oriented to person, place, and time.   Psychiatric:         Mood and Affect: Mood " normal.         Behavior: Behavior normal.         Thought Content: Thought content normal.       Procedures     Assessment    Diagnoses and all orders for this visit:    1. Gastroesophageal reflux disease, unspecified whether esophagitis present (Primary)  Comments:  Continue omeprazole as needed.    2. Essential hypertension  Comments:  Elevated today in office but stressed this morning.  Work on less sodium in diet.  Monitor bp with goal <140/90.  Let me know if staying elevated.    3. Hyperglycemia  Comments:  Will recheck labs at your convenience.  Orders:  -     Comprehensive Metabolic Panel; Future  -     Hemoglobin A1c; Future    4. Screening cholesterol level  Comments:  will recheck.  Orders:  -     Lipid Panel; Future    5. Iron deficiency  Comments:  will recheck.  Orders:  -     CBC & Differential; Future  -     Iron and TIBC; Future  -     Ferritin; Future    6. Obesity with body mass index greater than 30  Comments:  Great job on weight loss!  Keep up the good work!    7. Mild intermittent asthma without complication  Comments:  well controlled. Continue albuterol as needed.

## 2025-02-17 ENCOUNTER — TELEPHONE (OUTPATIENT)
Dept: FAMILY MEDICINE CLINIC | Facility: CLINIC | Age: 45
End: 2025-02-17

## 2025-02-17 ENCOUNTER — TELEPHONE (OUTPATIENT)
Dept: NEUROLOGY | Facility: CLINIC | Age: 45
End: 2025-02-17

## 2025-02-17 NOTE — TELEPHONE ENCOUNTER
Caller: Yoan Salas    Relationship: Self    Best call back number: [phone]    What is the best time to reach you: ANY    Who are you requesting to speak with (clinical staff, provider,  specific staff member): CLINICAL    Do you know the name of the person who called: N/A    What was the call regarding: PATIENT IS CALLING IN WITH QUESTIONS REGARDING FUTURE MEDICATION REFILLS ON HIS DEVOPROLEX     Is it okay if the provider responds through MyChart: NO

## 2025-02-17 NOTE — TELEPHONE ENCOUNTER
It looks like neurology has been prescribing this.  Does he want me to start writing for this or did he call the wrong office.

## 2025-02-17 NOTE — TELEPHONE ENCOUNTER
Caller: NATHAN    Best call back number: [phone]     What was the call regarding: PT IS WONDERING ABOUT RX DIVALPROEX 500 MG AS HAS HEARD HAS MOOD GREG?  CONCERNED HE MAY NOT BE ABLE TO GET ANYMORE DUE TO GOVERNMENT DECISIONS? HEAD OF [facility]?      Is it okay if the provider responds through [facility]: CALL     PLEASE ADVISE.

## 2025-05-23 ENCOUNTER — TELEPHONE (OUTPATIENT)
Dept: FAMILY MEDICINE CLINIC | Facility: CLINIC | Age: 45
End: 2025-05-23
Payer: COMMERCIAL

## 2025-05-23 NOTE — TELEPHONE ENCOUNTER
This form likely needs to be completed by his neurologist since they mange his seizure disorder.  It looks like he sees Dr. Seipel's office.

## 2025-05-23 NOTE — TELEPHONE ENCOUNTER
Pt dropped off a physicians certificate of medical impairment form from the [facility] due to him having seizures. He normally sees Christa Padron but since she is on vacation I was not sure who to send this to. I will put the form in your box but if I need to move it just let me know. Thanks!

## 2025-06-16 DIAGNOSIS — R56.9 GENERALIZED CONVULSIVE SEIZURES: ICD-10-CM

## 2025-06-16 RX ORDER — DIVALPROEX SODIUM 500 MG/1
TABLET, FILM COATED, EXTENDED RELEASE ORAL
Qty: 180 TABLET | Refills: 3 | Status: SHIPPED | OUTPATIENT
Start: 2025-06-16

## 2025-06-23 NOTE — PROGRESS NOTES
Subjective: Epilepsy    Patient ID: Yoan Salas is a 45 y.o. adult.    History of Present Illness Mr. Malhotra is a 45-year-old  male who presented today for follow-up on epilepsy.  He was previously managed on Depakote  mg nightly.  He was last seen 5/15/2023.  And was lost to follow-up.  Today he presented with his wife and is currently on Depakote  mg twice daily.  He states he has been on this dose for a year with no breakthrough seizures.  He was notified if he misses a pill he could have a breakthrough seizure.  Past medical history:   He had a craniotomy November 2021 for large meningioma with mass effect left temporal parietal area. EEG February 2022 was abnormal with changes consistent with breach rhythm in the area of prior craniotomy there was some sharp activity seen in this area with phase reversal at the T4 electrode which may be associated with a seizure disorder of focal cortical origin. Patient was admitted to [facility] with new onset seizure November 2022.     Seizure Type: Generalized  Onset: Postcraniotomy  Frequency:rare  Last Seizure: 1 in April 2024  Semiology change: Unchanged  Medication: Depakote  bid  Any adverse effects of meds-no  Failed medications Keppra    Testing:  MRI brain  IMPRESSION:  1. Left frontal temporal craniotomy for previous meningioma resection.  No pathologic enhancement is seen, and there is no indication of tumor  recurrence.  Electronically Signed By-Sonia Souza MD On:11/23/2022 9:11 AM  This report was finalized on [id number] by  Sonia Souza MD.    EEG  IMPRESSION:  This is an abnormal EEG.  There is presence of changes consistent with a breach rhythm in the area of the reported prior craniotomy.  Some sharp activity was seen in this area with a phase reversal at the T4 electrode which may be associated with a seizure disorder of focal cortical origin.   Electronically signed by:  Joseph Seipel, MD  February 19,  2024           The following portions of the patient's history were reviewed and updated as appropriate: allergies, current medications, past family history, past medical history, past social history, past surgical history and problem list.    Family History   Problem Relation Age of Onset    Cancer Maternal Grandfather        Past Medical History:   Diagnosis Date    Allergic     Anxiety     Asthma     Cancer     ALL as child     Class 3 severe obesity with body mass index (BMI) of 40.0 to 44.9 in adult 11/02/2021    Depression     GERD (gastroesophageal reflux disease)     Hypertension     Leukemia in remission, childhood; S/P Radiation     Seizures        Social History     Socioeconomic History    Marital status:    Tobacco Use    Smoking status: Never    Smokeless tobacco: Never   Vaping Use    Vaping status: Never Used   Substance and Sexual Activity    Alcohol use: Yes     Alcohol/week: 10.0 standard drinks of alcohol     Types: 10 Shots of liquor per week     Comment: Weekends    Drug use: Never    Sexual activity: Not Currently         Current Outpatient Medications:     albuterol sulfate  (90 Base) MCG/ACT inhaler, Inhale 2 puffs Every 4 (Four) Hours As Needed for Wheezing or Shortness of Air., Disp: 8.5 g, Rfl: 0    divalproex (DEPAKOTE ER) 500 MG 24 hr tablet, Take 1 tablet by mouth 2 (Two) Times a Day., Disp: 180 tablet, Rfl: 3    omeprazole (priLOSEC) 20 MG capsule, Take 1 capsule by mouth Daily., Disp: , Rfl:     Review of Systems   All other systems reviewed and are negative.         I have reviewed ROS completed by medical assistant.     Objective:      Neurological Exam  Mental Status  Awake and alert. Oriented only to person, place and time. Speech is normal. Language is fluent with no aphasia. Attention and concentration are normal. Fund of knowledge is appropriate for level of education.    Cranial Nerves  CN II: Right visual acuity: Normal. Left visual acuity: Normal. Right normal  visual field. Left normal visual field.  CN III, IV, VI: Extraocular movements intact bilaterally. No nystagmus. Normal saccades. Normal smooth pursuit.   Right pupil: 2 mm. Reactive to light. Reactive to accommodation.   Left pupil: 2 mm. Reactive to light. Reactive to accommodation.  CN V:  Right: Facial sensation is normal.  Left: Facial sensation is normal on the left.  CN VII:  Right: There is no facial weakness.  Left: There is no facial weakness.    Motor  Normal muscle bulk throughout.    Gait  Casual gait is normal including stance, stride, and arm swing.       Assessment/Plan:  The patient will be continued on Depakote  mg twice daily.  He and his wife were instructed on seizure precautions.  He was notified if he misses 1 pill he could have a breakthrough seizure.  He is to call the clinic if he has any breakthrough seizures, side effects of Depakote or questions.    The patient was told to observe all seizure precautions, including, but not limited to:   If a Seizures occurs: No driving until seizure free for more than 3 months  Avoid all high-risk activity, examples: Take showers instead of baths, avoid swimming without observation, Avoid open heat sources, Avoid working at heights, and Avoid engaging in all potentially hazardous activities , Avoid use of fire arms/hunting,    Patient expressed clear understanding.     Extensive clinic time counseling patient and family on the following topics: common seizure triggers (sleep deprivation, medication non-compliance, stress, fever, and drugs/alcohol), personal risk for recurrence, epilepsy specific safety issues, and seizure first aid. If events last longer than 5 minutes, if the patient has multiple events without return to baseline, or if the patient has a serious injury from seizure, I advised them to call 911 immediately.           Diagnoses and all orders for this visit:    1. Generalized convulsive seizures  -     divalproex (DEPAKOTE ER) 500  MG 24 hr tablet; Take 1 tablet by mouth 2 (Two) Times a Day.  Dispense: 180 tablet; Refill: 3          Return in about 1 year (around 6/25/2026) for Stephanie Lama , Next scheduled follow up.     I spent 34 minutes caring for Yoan on this date of service. This time includes time spent by me in the following activities: preparing for the visit, reviewing tests, obtaining and/or reviewing a separately obtained history, performing a medically appropriate examination and/or evaluation, counseling and educating the patient/family/caregiver, ordering medications, tests, or procedures, and documenting information in the medical record.      This document has been electronically signed by MK Suh on June 25, 2025 10:09 EDT

## 2025-06-25 ENCOUNTER — OFFICE VISIT (OUTPATIENT)
Dept: NEUROLOGY | Facility: CLINIC | Age: 45
End: 2025-06-25
Payer: COMMERCIAL

## 2025-06-25 VITALS
WEIGHT: 243 LBS | DIASTOLIC BLOOD PRESSURE: 94 MMHG | HEIGHT: 72 IN | BODY MASS INDEX: 32.91 KG/M2 | HEART RATE: 91 BPM | SYSTOLIC BLOOD PRESSURE: 145 MMHG

## 2025-06-25 DIAGNOSIS — R56.9 GENERALIZED CONVULSIVE SEIZURES: ICD-10-CM

## 2025-06-25 PROCEDURE — 99214 OFFICE O/P EST MOD 30 MIN: CPT | Performed by: NURSE PRACTITIONER

## 2025-06-25 RX ORDER — DIVALPROEX SODIUM 500 MG/1
500 TABLET, FILM COATED, EXTENDED RELEASE ORAL 2 TIMES DAILY
Qty: 180 TABLET | Refills: 3 | Status: SHIPPED | OUTPATIENT
Start: 2025-06-25

## (undated) DEVICE — STRAIGHT TIP, UNIVERSAL

## (undated) DEVICE — SUT ETHLN 4/0 P3 18IN 699H

## (undated) DEVICE — DRSNG SURESITE WNDW 4X4.5

## (undated) DEVICE — SPNG GZ AVANT 6PLY 4X4IN STRL PK/2

## (undated) DEVICE — SUT ETHILON 3/0 30IN BLK

## (undated) DEVICE — DRSNG SURESITE123 6X8IN

## (undated) DEVICE — TUBING, SUCTION, 1/4" X 12', STRAIGHT: Brand: MEDLINE

## (undated) DEVICE — SOL LACTATED RINGER 1000ML

## (undated) DEVICE — SUT ETHLN 4/0 PS2 PLSTC 1667G

## (undated) DEVICE — KT SURG TURNOVER 050

## (undated) DEVICE — PK CRANIOTOMY 50

## (undated) DEVICE — CODMAN® SURGICAL PATTIES 1" X 3" (2.54CM X 7.62CM): Brand: CODMAN®

## (undated) DEVICE — SUT VIC 3/0 SH CR8 18IN J864D

## (undated) DEVICE — CLIP RANEY

## (undated) DEVICE — PENCL HND ROCKRSWTCH HOLSTR EZ CLEAN TP CRD 10FT

## (undated) DEVICE — CATH IV INSYTE AUTOGARD SHLD 20G 1.16IN

## (undated) DEVICE — DECANTER: Brand: UNBRANDED

## (undated) DEVICE — SUT VIC 2/0 CT1 CR8 18IN J839D

## (undated) DEVICE — KT DRN EVAC WND PVC PCH WTROC RND 10F400

## (undated) DEVICE — SUT PERMAHAND SILK 0 FSL 30IN BLK

## (undated) DEVICE — CODMAN® SURGICAL PATTIES 3/4" X 3/4" (1.91CM X 1.91CM): Brand: CODMAN®

## (undated) DEVICE — SOLUTION,WATER,IRRIGATION,1000ML,STERILE: Brand: MEDLINE

## (undated) DEVICE — SPONGE,LAP,12"X12",XR,ST,5/PK,40PK/CS: Brand: MEDLINE

## (undated) DEVICE — UNDYED BRAIDED (POLYGLACTIN 910), SYNTHETIC ABSORBABLE SUTURE: Brand: COATED VICRYL

## (undated) DEVICE — DRSNG TELFA PAD NONADH STR 1S 3X8IN

## (undated) DEVICE — GLV SURG SENSICARE PI ORTHO SZ8 LF STRL

## (undated) DEVICE — SPHR MARKR STEALTH STATION

## (undated) DEVICE — DISPOSABLE TUBING SET AND EXTENDER FILTER TUBING

## (undated) DEVICE — CODMAN® SURGICAL PATTIES 1/2" X 1/2" (1.27CM X 1.27CM): Brand: CODMAN®

## (undated) DEVICE — CODMAN® SURGICAL PATTIES 1/4" X 1/4" (0.64CM X 0.64CM): Brand: CODMAN®

## (undated) DEVICE — 3.0MM TAPERED ROUTER

## (undated) DEVICE — FRCP SPEC BIP 203X76X1.5MM DISP

## (undated) DEVICE — SUT ETHLN 3/0 PS1 18IN 1663H

## (undated) DEVICE — BANDAGE,GAUZE,BULKEE II,4.5"X4.1YD,STRL: Brand: MEDLINE

## (undated) DEVICE — BATTERY PACK FOR VARISPEED: Brand: STRYKER VARISPEED

## (undated) DEVICE — SUT SILK 0 FSL 18IN 678G

## (undated) DEVICE — ST EXT MAXPLUS PRESS RATED 7IN